# Patient Record
Sex: FEMALE | Race: WHITE | NOT HISPANIC OR LATINO | URBAN - METROPOLITAN AREA
[De-identification: names, ages, dates, MRNs, and addresses within clinical notes are randomized per-mention and may not be internally consistent; named-entity substitution may affect disease eponyms.]

---

## 2023-02-16 ENCOUNTER — OFFICE VISIT (OUTPATIENT)
Dept: ORTHOPEDIC SURGERY | Facility: CLINIC | Age: 78
End: 2023-02-16
Payer: MEDICARE

## 2023-02-16 VITALS — OXYGEN SATURATION: 96 % | HEART RATE: 78 BPM | HEIGHT: 68 IN | BODY MASS INDEX: 29.58 KG/M2 | WEIGHT: 195.2 LBS

## 2023-02-16 DIAGNOSIS — M16.12 PRIMARY LOCALIZED OSTEOARTHROSIS OF THE HIP, LEFT: ICD-10-CM

## 2023-02-16 DIAGNOSIS — M25.552 LEFT HIP PAIN: Primary | ICD-10-CM

## 2023-02-16 PROCEDURE — 99203 OFFICE O/P NEW LOW 30 MIN: CPT | Performed by: ORTHOPAEDIC SURGERY

## 2023-02-16 PROCEDURE — 96372 THER/PROPH/DIAG INJ SC/IM: CPT | Performed by: ORTHOPAEDIC SURGERY

## 2023-02-16 RX ORDER — DEXAMETHASONE SODIUM PHOSPHATE 4 MG/ML
8 INJECTION, SOLUTION INTRA-ARTICULAR; INTRALESIONAL; INTRAMUSCULAR; INTRAVENOUS; SOFT TISSUE ONCE
Status: DISCONTINUED | OUTPATIENT
Start: 2023-02-16 | End: 2023-03-02

## 2023-02-16 RX ORDER — UBIDECARENONE 75 MG
50 CAPSULE ORAL DAILY
COMMUNITY

## 2023-02-16 RX ORDER — CITALOPRAM 10 MG/1
TABLET ORAL
COMMUNITY
Start: 2023-02-15

## 2023-02-16 RX ORDER — MULTIVITAMIN WITH IRON
TABLET ORAL
COMMUNITY

## 2023-02-16 RX ORDER — ATENOLOL 25 MG/1
25 TABLET ORAL DAILY
COMMUNITY

## 2023-02-16 RX ORDER — OXYBUTYNIN CHLORIDE 5 MG/1
5 TABLET ORAL 3 TIMES DAILY
COMMUNITY

## 2023-02-16 RX ORDER — ASPIRIN 81 MG/1
81 TABLET ORAL DAILY
COMMUNITY

## 2023-02-16 RX ORDER — BACLOFEN 10 MG/1
10 TABLET ORAL 3 TIMES DAILY
COMMUNITY

## 2023-02-16 RX ORDER — GABAPENTIN 300 MG/1
300 CAPSULE ORAL 3 TIMES DAILY
COMMUNITY
Start: 2022-11-04

## 2023-03-02 RX ORDER — DEXAMETHASONE SODIUM PHOSPHATE 4 MG/ML
8 INJECTION, SOLUTION INTRA-ARTICULAR; INTRALESIONAL; INTRAMUSCULAR; INTRAVENOUS; SOFT TISSUE ONCE
Status: SHIPPED | OUTPATIENT
Start: 2023-03-02

## 2023-03-30 ENCOUNTER — OFFICE VISIT (OUTPATIENT)
Dept: ORTHOPEDIC SURGERY | Facility: CLINIC | Age: 78
End: 2023-03-30
Payer: MEDICARE

## 2023-03-30 VITALS — BODY MASS INDEX: 30.61 KG/M2 | HEIGHT: 67 IN | WEIGHT: 195 LBS

## 2023-03-30 DIAGNOSIS — M16.12 PRIMARY LOCALIZED OSTEOARTHROSIS OF THE HIP, LEFT: Primary | ICD-10-CM

## 2023-03-30 RX ORDER — OXYBUTYNIN CHLORIDE 15 MG/1
1 TABLET, EXTENDED RELEASE ORAL DAILY
COMMUNITY
Start: 2023-03-21

## 2023-10-17 ENCOUNTER — OFFICE VISIT (OUTPATIENT)
Dept: ORTHOPEDIC SURGERY | Facility: CLINIC | Age: 78
End: 2023-10-17
Payer: MEDICARE

## 2023-10-17 VITALS
OXYGEN SATURATION: 92 % | SYSTOLIC BLOOD PRESSURE: 123 MMHG | HEIGHT: 67 IN | HEART RATE: 70 BPM | DIASTOLIC BLOOD PRESSURE: 83 MMHG | BODY MASS INDEX: 29.66 KG/M2 | WEIGHT: 189 LBS

## 2023-10-17 DIAGNOSIS — M25.552 LEFT HIP PAIN: ICD-10-CM

## 2023-10-17 DIAGNOSIS — M16.12 OSTEOARTHRITIS OF LEFT HIP, UNSPECIFIED OSTEOARTHRITIS TYPE: Primary | ICD-10-CM

## 2023-10-17 RX ORDER — MELOXICAM 15 MG/1
15 TABLET ORAL DAILY
Qty: 30 TABLET | Refills: 1 | Status: SHIPPED | OUTPATIENT
Start: 2023-10-17

## 2023-10-17 RX ORDER — DEXAMETHASONE SODIUM PHOSPHATE 4 MG/ML
8 INJECTION, SOLUTION INTRA-ARTICULAR; INTRALESIONAL; INTRAMUSCULAR; INTRAVENOUS; SOFT TISSUE ONCE
Status: COMPLETED | OUTPATIENT
Start: 2023-10-17 | End: 2023-10-17

## 2023-10-17 RX ADMIN — DEXAMETHASONE SODIUM PHOSPHATE 8 MG: 4 INJECTION, SOLUTION INTRA-ARTICULAR; INTRALESIONAL; INTRAMUSCULAR; INTRAVENOUS; SOFT TISSUE at 15:18

## 2024-02-27 ENCOUNTER — ANESTHESIA EVENT (OUTPATIENT)
Age: 79
End: 2024-02-27
Payer: MEDICARE

## 2024-03-05 ENCOUNTER — APPOINTMENT (OUTPATIENT)
Age: 79
End: 2024-03-05
Payer: MEDICARE

## 2024-03-05 ENCOUNTER — ANESTHESIA (OUTPATIENT)
Age: 79
End: 2024-03-05
Payer: MEDICARE

## 2024-03-05 ENCOUNTER — HOSPITAL ENCOUNTER (OUTPATIENT)
Age: 79
Setting detail: HOSPITAL OUTPATIENT SURGERY
Discharge: HOME | End: 2024-03-05
Attending: UROLOGY | Admitting: UROLOGY
Payer: MEDICARE

## 2024-03-05 VITALS
SYSTOLIC BLOOD PRESSURE: 135 MMHG | WEIGHT: 185 LBS | DIASTOLIC BLOOD PRESSURE: 79 MMHG | OXYGEN SATURATION: 93 % | HEART RATE: 69 BPM | HEIGHT: 67 IN | TEMPERATURE: 97.4 F | RESPIRATION RATE: 16 BRPM | BODY MASS INDEX: 29.03 KG/M2

## 2024-03-05 DIAGNOSIS — N20.0 RIGHT RENAL STONE: Primary | ICD-10-CM

## 2024-03-05 PROCEDURE — 25010000002 AZTREONAM PER 500 MG: Performed by: UROLOGY

## 2024-03-05 PROCEDURE — 25010000002 ONDANSETRON PER 1 MG: Performed by: ANESTHESIOLOGY

## 2024-03-05 PROCEDURE — 76000 FLUOROSCOPY <1 HR PHYS/QHP: CPT

## 2024-03-05 PROCEDURE — 82365 CALCULUS SPECTROSCOPY: CPT | Performed by: UROLOGY

## 2024-03-05 PROCEDURE — 0 IOTHALAMATE 60 % SOLUTION: Performed by: UROLOGY

## 2024-03-05 PROCEDURE — 25010000002 LIDOCAINE 1 % SOLUTION: Performed by: ANESTHESIOLOGY

## 2024-03-05 PROCEDURE — C1894 INTRO/SHEATH, NON-LASER: HCPCS | Performed by: UROLOGY

## 2024-03-05 PROCEDURE — C1758 CATHETER, URETERAL: HCPCS | Performed by: UROLOGY

## 2024-03-05 PROCEDURE — 25010000002 FENTANYL CITRATE (PF) 50 MCG/ML SOLUTION: Performed by: ANESTHESIOLOGY

## 2024-03-05 PROCEDURE — C1769 GUIDE WIRE: HCPCS | Performed by: UROLOGY

## 2024-03-05 PROCEDURE — 52356 CYSTO/URETERO W/LITHOTRIPSY: CPT | Performed by: UROLOGY

## 2024-03-05 PROCEDURE — 25010000002 PROPOFOL 1000 MG/100ML EMULSION: Performed by: ANESTHESIOLOGY

## 2024-03-05 PROCEDURE — C2617 STENT, NON-COR, TEM W/O DEL: HCPCS | Performed by: UROLOGY

## 2024-03-05 PROCEDURE — 25810000003 LACTATED RINGERS PER 1000 ML: Performed by: ANESTHESIOLOGY

## 2024-03-05 PROCEDURE — 25010000002 HYDROMORPHONE PER 4 MG: Performed by: ANESTHESIOLOGY

## 2024-03-05 PROCEDURE — 25010000002 DEXAMETHASONE PER 1 MG: Performed by: ANESTHESIOLOGY

## 2024-03-05 RX ORDER — DIPHENHYDRAMINE HYDROCHLORIDE 50 MG/ML
12.5 INJECTION INTRAMUSCULAR; INTRAVENOUS
Status: DISCONTINUED | OUTPATIENT
Start: 2024-03-05 | End: 2024-03-05 | Stop reason: HOSPADM

## 2024-03-05 RX ORDER — ONDANSETRON 2 MG/ML
INJECTION INTRAMUSCULAR; INTRAVENOUS AS NEEDED
Status: DISCONTINUED | OUTPATIENT
Start: 2024-03-05 | End: 2024-03-05 | Stop reason: SURG

## 2024-03-05 RX ORDER — DEXAMETHASONE SODIUM PHOSPHATE 4 MG/ML
INJECTION, SOLUTION INTRA-ARTICULAR; INTRALESIONAL; INTRAMUSCULAR; INTRAVENOUS; SOFT TISSUE AS NEEDED
Status: DISCONTINUED | OUTPATIENT
Start: 2024-03-05 | End: 2024-03-05 | Stop reason: SURG

## 2024-03-05 RX ORDER — PROPOFOL 10 MG/ML
INJECTION, EMULSION INTRAVENOUS AS NEEDED
Status: DISCONTINUED | OUTPATIENT
Start: 2024-03-05 | End: 2024-03-05 | Stop reason: SURG

## 2024-03-05 RX ORDER — LIDOCAINE HYDROCHLORIDE 10 MG/ML
INJECTION, SOLUTION INFILTRATION; PERINEURAL AS NEEDED
Status: DISCONTINUED | OUTPATIENT
Start: 2024-03-05 | End: 2024-03-05 | Stop reason: SURG

## 2024-03-05 RX ORDER — FLUMAZENIL 0.1 MG/ML
0.2 INJECTION INTRAVENOUS AS NEEDED
Status: DISCONTINUED | OUTPATIENT
Start: 2024-03-05 | End: 2024-03-05 | Stop reason: HOSPADM

## 2024-03-05 RX ORDER — SODIUM CHLORIDE 0.9 % (FLUSH) 0.9 %
3 SYRINGE (ML) INJECTION EVERY 12 HOURS SCHEDULED
Status: DISCONTINUED | OUTPATIENT
Start: 2024-03-05 | End: 2024-03-05 | Stop reason: HOSPADM

## 2024-03-05 RX ORDER — DROPERIDOL 2.5 MG/ML
0.62 INJECTION, SOLUTION INTRAMUSCULAR; INTRAVENOUS
Status: DISCONTINUED | OUTPATIENT
Start: 2024-03-05 | End: 2024-03-05 | Stop reason: HOSPADM

## 2024-03-05 RX ORDER — PROMETHAZINE HYDROCHLORIDE 25 MG/1
25 SUPPOSITORY RECTAL ONCE AS NEEDED
Status: DISCONTINUED | OUTPATIENT
Start: 2024-03-05 | End: 2024-03-05 | Stop reason: HOSPADM

## 2024-03-05 RX ORDER — SODIUM CHLORIDE, SODIUM LACTATE, POTASSIUM CHLORIDE, CALCIUM CHLORIDE 600; 310; 30; 20 MG/100ML; MG/100ML; MG/100ML; MG/100ML
9 INJECTION, SOLUTION INTRAVENOUS CONTINUOUS
Status: DISCONTINUED | OUTPATIENT
Start: 2024-03-05 | End: 2024-03-05 | Stop reason: HOSPADM

## 2024-03-05 RX ORDER — HYDRALAZINE HYDROCHLORIDE 20 MG/ML
5 INJECTION INTRAMUSCULAR; INTRAVENOUS
Status: DISCONTINUED | OUTPATIENT
Start: 2024-03-05 | End: 2024-03-05 | Stop reason: HOSPADM

## 2024-03-05 RX ORDER — LABETALOL HYDROCHLORIDE 5 MG/ML
5 INJECTION, SOLUTION INTRAVENOUS
Status: DISCONTINUED | OUTPATIENT
Start: 2024-03-05 | End: 2024-03-05 | Stop reason: HOSPADM

## 2024-03-05 RX ORDER — FENTANYL CITRATE 50 UG/ML
25 INJECTION, SOLUTION INTRAMUSCULAR; INTRAVENOUS
Status: DISCONTINUED | OUTPATIENT
Start: 2024-03-05 | End: 2024-03-05 | Stop reason: HOSPADM

## 2024-03-05 RX ORDER — MULTIVITAMIN WITH IRON
240 TABLET ORAL DAILY
COMMUNITY

## 2024-03-05 RX ORDER — HYDROMORPHONE HYDROCHLORIDE 1 MG/ML
0.25 INJECTION, SOLUTION INTRAMUSCULAR; INTRAVENOUS; SUBCUTANEOUS
Status: DISCONTINUED | OUTPATIENT
Start: 2024-03-05 | End: 2024-03-05 | Stop reason: HOSPADM

## 2024-03-05 RX ORDER — FAMOTIDINE 10 MG/ML
20 INJECTION, SOLUTION INTRAVENOUS ONCE
Status: COMPLETED | OUTPATIENT
Start: 2024-03-05 | End: 2024-03-05

## 2024-03-05 RX ORDER — BACLOFEN 20 MG/1
20 TABLET ORAL 3 TIMES DAILY
COMMUNITY
Start: 2023-11-20 | End: 2024-05-18

## 2024-03-05 RX ORDER — LISINOPRIL 20 MG/1
20 TABLET ORAL DAILY
COMMUNITY
Start: 2023-11-20 | End: 2024-05-18

## 2024-03-05 RX ORDER — MAGNESIUM HYDROXIDE 1200 MG/15ML
LIQUID ORAL AS NEEDED
Status: DISCONTINUED | OUTPATIENT
Start: 2024-03-05 | End: 2024-03-05 | Stop reason: HOSPADM

## 2024-03-05 RX ORDER — SULFAMETHOXAZOLE AND TRIMETHOPRIM 400; 80 MG/1; MG/1
1 TABLET ORAL 2 TIMES DAILY
Qty: 6 TABLET | Refills: 0 | Status: SHIPPED | OUTPATIENT
Start: 2024-03-05 | End: 2024-03-08

## 2024-03-05 RX ORDER — FENTANYL CITRATE 50 UG/ML
INJECTION, SOLUTION INTRAMUSCULAR; INTRAVENOUS AS NEEDED
Status: DISCONTINUED | OUTPATIENT
Start: 2024-03-05 | End: 2024-03-05 | Stop reason: SURG

## 2024-03-05 RX ORDER — HYDROCODONE BITARTRATE AND ACETAMINOPHEN 5; 325 MG/1; MG/1
1 TABLET ORAL ONCE AS NEEDED
Status: DISCONTINUED | OUTPATIENT
Start: 2024-03-05 | End: 2024-03-05 | Stop reason: HOSPADM

## 2024-03-05 RX ORDER — PHENAZOPYRIDINE HYDROCHLORIDE 100 MG/1
100 TABLET, FILM COATED ORAL 3 TIMES DAILY PRN
Qty: 10 TABLET | Refills: 1 | Status: SHIPPED | OUTPATIENT
Start: 2024-03-05 | End: 2024-04-04

## 2024-03-05 RX ORDER — HYDROCODONE BITARTRATE AND ACETAMINOPHEN 7.5; 325 MG/1; MG/1
1 TABLET ORAL EVERY 4 HOURS PRN
Status: DISCONTINUED | OUTPATIENT
Start: 2024-03-05 | End: 2024-03-05 | Stop reason: HOSPADM

## 2024-03-05 RX ORDER — NALOXONE HCL 0.4 MG/ML
0.2 VIAL (ML) INJECTION AS NEEDED
Status: DISCONTINUED | OUTPATIENT
Start: 2024-03-05 | End: 2024-03-05 | Stop reason: HOSPADM

## 2024-03-05 RX ORDER — DOCUSATE SODIUM 100 MG/1
100 CAPSULE, LIQUID FILLED ORAL DAILY PRN
Qty: 30 CAPSULE | Refills: 1 | Status: SHIPPED | OUTPATIENT
Start: 2024-03-05 | End: 2025-03-05

## 2024-03-05 RX ORDER — PROMETHAZINE HYDROCHLORIDE 12.5 MG/1
25 TABLET ORAL ONCE AS NEEDED
Status: DISCONTINUED | OUTPATIENT
Start: 2024-03-05 | End: 2024-03-05 | Stop reason: HOSPADM

## 2024-03-05 RX ORDER — SODIUM CHLORIDE 0.9 % (FLUSH) 0.9 %
3-10 SYRINGE (ML) INJECTION AS NEEDED
Status: DISCONTINUED | OUTPATIENT
Start: 2024-03-05 | End: 2024-03-05 | Stop reason: HOSPADM

## 2024-03-05 RX ORDER — ONDANSETRON 2 MG/ML
4 INJECTION INTRAMUSCULAR; INTRAVENOUS ONCE AS NEEDED
Status: DISCONTINUED | OUTPATIENT
Start: 2024-03-05 | End: 2024-03-05 | Stop reason: HOSPADM

## 2024-03-05 RX ADMIN — HYDROMORPHONE HYDROCHLORIDE 0.25 MG: 1 INJECTION, SOLUTION INTRAMUSCULAR; INTRAVENOUS; SUBCUTANEOUS at 13:05

## 2024-03-05 RX ADMIN — AZTREONAM 2000 MG: 2 INJECTION, POWDER, LYOPHILIZED, FOR SOLUTION INTRAMUSCULAR; INTRAVENOUS at 11:17

## 2024-03-05 RX ADMIN — HYDROMORPHONE HYDROCHLORIDE 0.25 MG: 1 INJECTION, SOLUTION INTRAMUSCULAR; INTRAVENOUS; SUBCUTANEOUS at 12:58

## 2024-03-05 RX ADMIN — SODIUM CHLORIDE, POTASSIUM CHLORIDE, SODIUM LACTATE AND CALCIUM CHLORIDE 9 ML/HR: 600; 310; 30; 20 INJECTION, SOLUTION INTRAVENOUS at 11:15

## 2024-03-05 RX ADMIN — DEXAMETHASONE SODIUM PHOSPHATE 4 MG: 4 INJECTION, SOLUTION INTRA-ARTICULAR; INTRALESIONAL; INTRAMUSCULAR; INTRAVENOUS; SOFT TISSUE at 11:32

## 2024-03-05 RX ADMIN — FENTANYL CITRATE 25 MCG: 50 INJECTION, SOLUTION INTRAMUSCULAR; INTRAVENOUS at 11:28

## 2024-03-05 RX ADMIN — ONDANSETRON 4 MG: 2 INJECTION INTRAMUSCULAR; INTRAVENOUS at 12:25

## 2024-03-05 RX ADMIN — LIDOCAINE HYDROCHLORIDE 100 MG: 10 INJECTION, SOLUTION INFILTRATION; PERINEURAL at 11:28

## 2024-03-05 RX ADMIN — PROPOFOL 200 MG: 10 INJECTION, EMULSION INTRAVENOUS at 11:28

## 2024-03-05 RX ADMIN — FENTANYL CITRATE 25 MCG: 50 INJECTION, SOLUTION INTRAMUSCULAR; INTRAVENOUS at 11:44

## 2024-03-05 RX ADMIN — FAMOTIDINE 20 MG: 10 INJECTION INTRAVENOUS at 11:17

## 2024-03-12 LAB
CALCIUM OXALATE DIHYDRATE MFR STONE IR: 30 %
COLOR STONE: NORMAL
COM MFR STONE: 60 %
COMPN STONE: NORMAL
HYDROXYAPATITE: 10 %
LABORATORY COMMENT REPORT: NORMAL
LABORATORY COMMENT REPORT: NORMAL
Lab: NORMAL
Lab: NORMAL
PHOTO: NORMAL
SIZE STONE: NORMAL MM
SPEC SOURCE SUBJ: NORMAL
STONE ANALYSIS-IMP: NORMAL
WT STONE: 27 MG

## 2024-03-26 ENCOUNTER — ANESTHESIA EVENT (OUTPATIENT)
Age: 79
End: 2024-03-26
Payer: MEDICARE

## 2024-03-26 RX ORDER — OXYCODONE HYDROCHLORIDE AND ACETAMINOPHEN 5; 325 MG/1; MG/1
1 TABLET ORAL EVERY 4 HOURS PRN
COMMUNITY
End: 2024-04-02 | Stop reason: HOSPADM

## 2024-04-02 ENCOUNTER — HOSPITAL ENCOUNTER (OUTPATIENT)
Facility: HOSPITAL | Age: 79
LOS: 1 days | Discharge: HOME | End: 2024-04-03
Attending: EMERGENCY MEDICINE | Admitting: UROLOGY
Payer: MEDICARE

## 2024-04-02 ENCOUNTER — APPOINTMENT (OUTPATIENT)
Age: 79
End: 2024-04-02
Payer: MEDICARE

## 2024-04-02 ENCOUNTER — HOSPITAL ENCOUNTER (OUTPATIENT)
Age: 79
Setting detail: HOSPITAL OUTPATIENT SURGERY
Discharge: HOME | End: 2024-04-02
Attending: UROLOGY | Admitting: UROLOGY
Payer: MEDICARE

## 2024-04-02 ENCOUNTER — ANESTHESIA (OUTPATIENT)
Age: 79
End: 2024-04-02
Payer: MEDICARE

## 2024-04-02 ENCOUNTER — ANESTHESIA EVENT (OUTPATIENT)
Dept: PERIOP | Facility: HOSPITAL | Age: 79
End: 2024-04-02
Payer: MEDICARE

## 2024-04-02 ENCOUNTER — APPOINTMENT (OUTPATIENT)
Dept: CT IMAGING | Facility: HOSPITAL | Age: 79
End: 2024-04-02
Payer: MEDICARE

## 2024-04-02 VITALS
HEART RATE: 73 BPM | SYSTOLIC BLOOD PRESSURE: 137 MMHG | OXYGEN SATURATION: 92 % | RESPIRATION RATE: 16 BRPM | TEMPERATURE: 98.1 F | HEIGHT: 67 IN | DIASTOLIC BLOOD PRESSURE: 75 MMHG | WEIGHT: 185 LBS | BODY MASS INDEX: 29.03 KG/M2

## 2024-04-02 DIAGNOSIS — N20.0 KIDNEY STONE: Primary | ICD-10-CM

## 2024-04-02 DIAGNOSIS — N20.0 RENAL CALCULUS, RIGHT: Primary | ICD-10-CM

## 2024-04-02 DIAGNOSIS — N99.89 POSTOPERATIVE SURGICAL COMPLICATION INVOLVING GENITOURINARY SYSTEM ASSOCIATED WITH GENITOURINARY PROCEDURE, UNSPECIFIED COMPLICATION: ICD-10-CM

## 2024-04-02 DIAGNOSIS — N20.0 RENAL CALCULUS, RIGHT: ICD-10-CM

## 2024-04-02 DIAGNOSIS — N13.30 HYDRONEPHROSIS OF RIGHT KIDNEY: ICD-10-CM

## 2024-04-02 DIAGNOSIS — N20.1 URETERAL CALCULI: ICD-10-CM

## 2024-04-02 DIAGNOSIS — N12 PYELITIS: ICD-10-CM

## 2024-04-02 LAB
ALBUMIN SERPL-MCNC: 3.6 G/DL (ref 3.5–5.2)
ALBUMIN/GLOB SERPL: 1.2 G/DL
ALP SERPL-CCNC: 76 U/L (ref 39–117)
ALT SERPL W P-5'-P-CCNC: 42 U/L (ref 1–33)
ANION GAP SERPL CALCULATED.3IONS-SCNC: 13.7 MMOL/L (ref 5–15)
AST SERPL-CCNC: 61 U/L (ref 1–32)
BACTERIA UR QL AUTO: ABNORMAL /HPF
BASOPHILS # BLD AUTO: 0.01 10*3/MM3 (ref 0–0.2)
BASOPHILS NFR BLD AUTO: 0.1 % (ref 0–1.5)
BILIRUB SERPL-MCNC: 0.4 MG/DL (ref 0–1.2)
BILIRUB UR QL STRIP: ABNORMAL
BUN SERPL-MCNC: 14 MG/DL (ref 8–23)
BUN/CREAT SERPL: 17.1 (ref 7–25)
CALCIUM SPEC-SCNC: 8.9 MG/DL (ref 8.6–10.5)
CHLORIDE SERPL-SCNC: 101 MMOL/L (ref 98–107)
CLARITY UR: ABNORMAL
CO2 SERPL-SCNC: 21.3 MMOL/L (ref 22–29)
COLOR UR: ABNORMAL
CREAT SERPL-MCNC: 0.82 MG/DL (ref 0.57–1)
DEPRECATED RDW RBC AUTO: 45.2 FL (ref 37–54)
EGFRCR SERPLBLD CKD-EPI 2021: 73.3 ML/MIN/1.73
EOSINOPHIL # BLD AUTO: 0 10*3/MM3 (ref 0–0.4)
EOSINOPHIL NFR BLD AUTO: 0 % (ref 0.3–6.2)
ERYTHROCYTE [DISTWIDTH] IN BLOOD BY AUTOMATED COUNT: 13.5 % (ref 12.3–15.4)
GLOBULIN UR ELPH-MCNC: 3.1 GM/DL
GLUCOSE SERPL-MCNC: 222 MG/DL (ref 65–99)
GLUCOSE UR STRIP-MCNC: ABNORMAL MG/DL
HCT VFR BLD AUTO: 46.8 % (ref 34–46.6)
HGB BLD-MCNC: 14.9 G/DL (ref 12–15.9)
HGB UR QL STRIP.AUTO: ABNORMAL
HYALINE CASTS UR QL AUTO: ABNORMAL /LPF
IMM GRANULOCYTES # BLD AUTO: 0.04 10*3/MM3 (ref 0–0.05)
IMM GRANULOCYTES NFR BLD AUTO: 0.3 % (ref 0–0.5)
KETONES UR QL STRIP: ABNORMAL
LEUKOCYTE ESTERASE UR QL STRIP.AUTO: ABNORMAL
LYMPHOCYTES # BLD AUTO: 0.78 10*3/MM3 (ref 0.7–3.1)
LYMPHOCYTES NFR BLD AUTO: 5.8 % (ref 19.6–45.3)
MCH RBC QN AUTO: 28.9 PG (ref 26.6–33)
MCHC RBC AUTO-ENTMCNC: 31.8 G/DL (ref 31.5–35.7)
MCV RBC AUTO: 90.9 FL (ref 79–97)
MONOCYTES # BLD AUTO: 0.47 10*3/MM3 (ref 0.1–0.9)
MONOCYTES NFR BLD AUTO: 3.5 % (ref 5–12)
NEUTROPHILS NFR BLD AUTO: 12.09 10*3/MM3 (ref 1.7–7)
NEUTROPHILS NFR BLD AUTO: 90.3 % (ref 42.7–76)
NITRITE UR QL STRIP: ABNORMAL
NRBC BLD AUTO-RTO: 0 /100 WBC (ref 0–0.2)
PH UR STRIP.AUTO: ABNORMAL [PH]
PLATELET # BLD AUTO: 207 10*3/MM3 (ref 140–450)
PMV BLD AUTO: 9.7 FL (ref 6–12)
POTASSIUM SERPL-SCNC: 4.7 MMOL/L (ref 3.5–5.2)
PROT SERPL-MCNC: 6.7 G/DL (ref 6–8.5)
PROT UR QL STRIP: ABNORMAL
RBC # BLD AUTO: 5.15 10*6/MM3 (ref 3.77–5.28)
RBC # UR STRIP: ABNORMAL /HPF
REF LAB TEST METHOD: ABNORMAL
SODIUM SERPL-SCNC: 136 MMOL/L (ref 136–145)
SP GR UR STRIP: 1.02 (ref 1–1.03)
SQUAMOUS #/AREA URNS HPF: ABNORMAL /HPF
UROBILINOGEN UR QL STRIP: ABNORMAL
WBC # UR STRIP: ABNORMAL /HPF
WBC NRBC COR # BLD AUTO: 13.39 10*3/MM3 (ref 3.4–10.8)

## 2024-04-02 PROCEDURE — 25010000002 ONDANSETRON PER 1 MG: Performed by: EMERGENCY MEDICINE

## 2024-04-02 PROCEDURE — C1758 CATHETER, URETERAL: HCPCS | Performed by: UROLOGY

## 2024-04-02 PROCEDURE — 81001 URINALYSIS AUTO W/SCOPE: CPT

## 2024-04-02 PROCEDURE — 25010000002 DEXAMETHASONE PER 1 MG: Performed by: NURSE ANESTHETIST, CERTIFIED REGISTERED

## 2024-04-02 PROCEDURE — C1769 GUIDE WIRE: HCPCS | Performed by: UROLOGY

## 2024-04-02 PROCEDURE — 99204 OFFICE O/P NEW MOD 45 MIN: CPT | Performed by: STUDENT IN AN ORGANIZED HEALTH CARE EDUCATION/TRAINING PROGRAM

## 2024-04-02 PROCEDURE — 74176 CT ABD & PELVIS W/O CONTRAST: CPT

## 2024-04-02 PROCEDURE — 25010000002 HYDROMORPHONE PER 4 MG: Performed by: NURSE ANESTHETIST, CERTIFIED REGISTERED

## 2024-04-02 PROCEDURE — 99285 EMERGENCY DEPT VISIT HI MDM: CPT

## 2024-04-02 PROCEDURE — 25010000002 PROPOFOL 10 MG/ML EMULSION: Performed by: NURSE ANESTHETIST, CERTIFIED REGISTERED

## 2024-04-02 PROCEDURE — 25010000002 MORPHINE PER 10 MG: Performed by: EMERGENCY MEDICINE

## 2024-04-02 PROCEDURE — 76000 FLUOROSCOPY <1 HR PHYS/QHP: CPT

## 2024-04-02 PROCEDURE — C1894 INTRO/SHEATH, NON-LASER: HCPCS | Performed by: UROLOGY

## 2024-04-02 PROCEDURE — 96375 TX/PRO/DX INJ NEW DRUG ADDON: CPT

## 2024-04-02 PROCEDURE — 25810000003 LACTATED RINGERS PER 1000 ML: Performed by: ANESTHESIOLOGY

## 2024-04-02 PROCEDURE — 80053 COMPREHEN METABOLIC PANEL: CPT

## 2024-04-02 PROCEDURE — 25010000002 AZTREONAM PER 500 MG: Performed by: UROLOGY

## 2024-04-02 PROCEDURE — 25010000002 KETOROLAC TROMETHAMINE PER 15 MG

## 2024-04-02 PROCEDURE — 52352 CYSTOURETERO W/STONE REMOVE: CPT | Performed by: UROLOGY

## 2024-04-02 PROCEDURE — 82365 CALCULUS SPECTROSCOPY: CPT | Performed by: UROLOGY

## 2024-04-02 PROCEDURE — 99222 1ST HOSP IP/OBS MODERATE 55: CPT | Performed by: UROLOGY

## 2024-04-02 PROCEDURE — 25010000002 HYDROMORPHONE 1 MG/ML SOLUTION: Performed by: NURSE ANESTHETIST, CERTIFIED REGISTERED

## 2024-04-02 PROCEDURE — 85025 COMPLETE CBC W/AUTO DIFF WBC: CPT

## 2024-04-02 PROCEDURE — 25810000003 SODIUM CHLORIDE 0.9 % SOLUTION

## 2024-04-02 PROCEDURE — 87086 URINE CULTURE/COLONY COUNT: CPT | Performed by: STUDENT IN AN ORGANIZED HEALTH CARE EDUCATION/TRAINING PROGRAM

## 2024-04-02 PROCEDURE — 0 DEXTROSE 5 % SOLUTION 250 ML FLEX CONT: Performed by: EMERGENCY MEDICINE

## 2024-04-02 PROCEDURE — 25010000002 ONDANSETRON PER 1 MG: Performed by: NURSE ANESTHETIST, CERTIFIED REGISTERED

## 2024-04-02 PROCEDURE — 96374 THER/PROPH/DIAG INJ IV PUSH: CPT

## 2024-04-02 PROCEDURE — 36415 COLL VENOUS BLD VENIPUNCTURE: CPT

## 2024-04-02 PROCEDURE — 25010000002 FENTANYL CITRATE (PF) 50 MCG/ML SOLUTION: Performed by: NURSE ANESTHETIST, CERTIFIED REGISTERED

## 2024-04-02 RX ORDER — DEXAMETHASONE SODIUM PHOSPHATE 4 MG/ML
INJECTION, SOLUTION INTRA-ARTICULAR; INTRALESIONAL; INTRAMUSCULAR; INTRAVENOUS; SOFT TISSUE AS NEEDED
Status: DISCONTINUED | OUTPATIENT
Start: 2024-04-02 | End: 2024-04-02 | Stop reason: SURG

## 2024-04-02 RX ORDER — ONDANSETRON 2 MG/ML
4 INJECTION INTRAMUSCULAR; INTRAVENOUS ONCE
Status: COMPLETED | OUTPATIENT
Start: 2024-04-02 | End: 2024-04-02

## 2024-04-02 RX ORDER — ONDANSETRON 4 MG/1
4 TABLET, FILM COATED ORAL EVERY 8 HOURS PRN
Qty: 20 TABLET | Refills: 0 | Status: SHIPPED | OUTPATIENT
Start: 2024-04-02 | End: 2024-05-02

## 2024-04-02 RX ORDER — OXYCODONE HYDROCHLORIDE AND ACETAMINOPHEN 5; 325 MG/1; MG/1
1 TABLET ORAL EVERY 4 HOURS PRN
Status: COMPLETED | OUTPATIENT
Start: 2024-04-02 | End: 2024-04-02

## 2024-04-02 RX ORDER — MIDAZOLAM HYDROCHLORIDE 1 MG/ML
0.5 INJECTION INTRAMUSCULAR; INTRAVENOUS
Status: DISCONTINUED | OUTPATIENT
Start: 2024-04-02 | End: 2024-04-02 | Stop reason: HOSPADM

## 2024-04-02 RX ORDER — PHENAZOPYRIDINE HYDROCHLORIDE 100 MG/1
100 TABLET, FILM COATED ORAL 3 TIMES DAILY PRN
Qty: 10 TABLET | Refills: 1 | Status: SHIPPED | OUTPATIENT
Start: 2024-04-02 | End: 2024-05-02

## 2024-04-02 RX ORDER — SODIUM CHLORIDE 0.9 % (FLUSH) 0.9 %
3 SYRINGE (ML) INJECTION EVERY 12 HOURS SCHEDULED
Status: DISCONTINUED | OUTPATIENT
Start: 2024-04-02 | End: 2024-04-02 | Stop reason: HOSPADM

## 2024-04-02 RX ORDER — NALOXONE HCL 0.4 MG/ML
0.2 VIAL (ML) INJECTION AS NEEDED
Status: DISCONTINUED | OUTPATIENT
Start: 2024-04-02 | End: 2024-04-02 | Stop reason: HOSPADM

## 2024-04-02 RX ORDER — DROPERIDOL 2.5 MG/ML
0.62 INJECTION, SOLUTION INTRAMUSCULAR; INTRAVENOUS
Status: DISCONTINUED | OUTPATIENT
Start: 2024-04-02 | End: 2024-04-02 | Stop reason: HOSPADM

## 2024-04-02 RX ORDER — HYDROMORPHONE HYDROCHLORIDE 1 MG/ML
0.25 INJECTION, SOLUTION INTRAMUSCULAR; INTRAVENOUS; SUBCUTANEOUS
Status: DISCONTINUED | OUTPATIENT
Start: 2024-04-02 | End: 2024-04-02 | Stop reason: HOSPADM

## 2024-04-02 RX ORDER — PROPOFOL 10 MG/ML
VIAL (ML) INTRAVENOUS AS NEEDED
Status: DISCONTINUED | OUTPATIENT
Start: 2024-04-02 | End: 2024-04-02 | Stop reason: SURG

## 2024-04-02 RX ORDER — DOCUSATE SODIUM 100 MG/1
100 CAPSULE, LIQUID FILLED ORAL DAILY PRN
Qty: 30 CAPSULE | Refills: 1 | Status: SHIPPED | OUTPATIENT
Start: 2024-04-02 | End: 2025-04-02

## 2024-04-02 RX ORDER — DIPHENHYDRAMINE HYDROCHLORIDE 50 MG/ML
12.5 INJECTION INTRAMUSCULAR; INTRAVENOUS
Status: DISCONTINUED | OUTPATIENT
Start: 2024-04-02 | End: 2024-04-02 | Stop reason: HOSPADM

## 2024-04-02 RX ORDER — HYDROCODONE BITARTRATE AND ACETAMINOPHEN 7.5; 325 MG/1; MG/1
1 TABLET ORAL EVERY 4 HOURS PRN
Status: DISCONTINUED | OUTPATIENT
Start: 2024-04-02 | End: 2024-04-02 | Stop reason: HOSPADM

## 2024-04-02 RX ORDER — HYDROCODONE BITARTRATE AND ACETAMINOPHEN 5; 325 MG/1; MG/1
1 TABLET ORAL ONCE AS NEEDED
Status: DISCONTINUED | OUTPATIENT
Start: 2024-04-02 | End: 2024-04-02 | Stop reason: HOSPADM

## 2024-04-02 RX ORDER — PROMETHAZINE HYDROCHLORIDE 12.5 MG/1
25 TABLET ORAL ONCE AS NEEDED
Status: DISCONTINUED | OUTPATIENT
Start: 2024-04-02 | End: 2024-04-02 | Stop reason: HOSPADM

## 2024-04-02 RX ORDER — SULFAMETHOXAZOLE AND TRIMETHOPRIM 800; 160 MG/1; MG/1
1 TABLET ORAL 2 TIMES DAILY
Qty: 6 TABLET | Refills: 0 | Status: ON HOLD | OUTPATIENT
Start: 2024-04-02 | End: 2024-04-03

## 2024-04-02 RX ORDER — FENTANYL CITRATE 50 UG/ML
INJECTION, SOLUTION INTRAMUSCULAR; INTRAVENOUS AS NEEDED
Status: DISCONTINUED | OUTPATIENT
Start: 2024-04-02 | End: 2024-04-02 | Stop reason: SURG

## 2024-04-02 RX ORDER — KETOROLAC TROMETHAMINE 30 MG/ML
30 INJECTION, SOLUTION INTRAMUSCULAR; INTRAVENOUS ONCE
Status: COMPLETED | OUTPATIENT
Start: 2024-04-02 | End: 2024-04-02

## 2024-04-02 RX ORDER — SODIUM CHLORIDE 0.9 % (FLUSH) 0.9 %
3-10 SYRINGE (ML) INJECTION AS NEEDED
Status: DISCONTINUED | OUTPATIENT
Start: 2024-04-02 | End: 2024-04-02 | Stop reason: HOSPADM

## 2024-04-02 RX ORDER — LIDOCAINE HYDROCHLORIDE 20 MG/ML
INJECTION, SOLUTION INFILTRATION; PERINEURAL AS NEEDED
Status: DISCONTINUED | OUTPATIENT
Start: 2024-04-02 | End: 2024-04-02 | Stop reason: SURG

## 2024-04-02 RX ORDER — PHENAZOPYRIDINE HYDROCHLORIDE 100 MG/1
100 TABLET, FILM COATED ORAL ONCE
Status: COMPLETED | OUTPATIENT
Start: 2024-04-02 | End: 2024-04-02

## 2024-04-02 RX ORDER — ONDANSETRON 2 MG/ML
INJECTION INTRAMUSCULAR; INTRAVENOUS AS NEEDED
Status: DISCONTINUED | OUTPATIENT
Start: 2024-04-02 | End: 2024-04-02 | Stop reason: SURG

## 2024-04-02 RX ORDER — FLUMAZENIL 0.1 MG/ML
0.2 INJECTION INTRAVENOUS AS NEEDED
Status: DISCONTINUED | OUTPATIENT
Start: 2024-04-02 | End: 2024-04-02 | Stop reason: HOSPADM

## 2024-04-02 RX ORDER — LABETALOL HYDROCHLORIDE 5 MG/ML
5 INJECTION, SOLUTION INTRAVENOUS
Status: DISCONTINUED | OUTPATIENT
Start: 2024-04-02 | End: 2024-04-02 | Stop reason: HOSPADM

## 2024-04-02 RX ORDER — PROMETHAZINE HYDROCHLORIDE 25 MG/1
25 SUPPOSITORY RECTAL ONCE AS NEEDED
Status: DISCONTINUED | OUTPATIENT
Start: 2024-04-02 | End: 2024-04-02 | Stop reason: HOSPADM

## 2024-04-02 RX ORDER — ACETAMINOPHEN 500 MG
1000 TABLET ORAL ONCE
Status: COMPLETED | OUTPATIENT
Start: 2024-04-02 | End: 2024-04-02

## 2024-04-02 RX ORDER — HYDRALAZINE HYDROCHLORIDE 20 MG/ML
5 INJECTION INTRAMUSCULAR; INTRAVENOUS
Status: DISCONTINUED | OUTPATIENT
Start: 2024-04-02 | End: 2024-04-02 | Stop reason: HOSPADM

## 2024-04-02 RX ORDER — ONDANSETRON 2 MG/ML
4 INJECTION INTRAMUSCULAR; INTRAVENOUS ONCE AS NEEDED
Status: DISCONTINUED | OUTPATIENT
Start: 2024-04-02 | End: 2024-04-02 | Stop reason: HOSPADM

## 2024-04-02 RX ORDER — FENTANYL CITRATE 50 UG/ML
25 INJECTION, SOLUTION INTRAMUSCULAR; INTRAVENOUS
Status: DISCONTINUED | OUTPATIENT
Start: 2024-04-02 | End: 2024-04-02 | Stop reason: HOSPADM

## 2024-04-02 RX ORDER — SODIUM CHLORIDE, SODIUM LACTATE, POTASSIUM CHLORIDE, CALCIUM CHLORIDE 600; 310; 30; 20 MG/100ML; MG/100ML; MG/100ML; MG/100ML
9 INJECTION, SOLUTION INTRAVENOUS CONTINUOUS
Status: DISCONTINUED | OUTPATIENT
Start: 2024-04-02 | End: 2024-04-02 | Stop reason: HOSPADM

## 2024-04-02 RX ADMIN — SODIUM CHLORIDE 1000 ML: 9 INJECTION, SOLUTION INTRAVENOUS at 22:24

## 2024-04-02 RX ADMIN — SULFAMETHOXAZOLE AND TRIMETHOPRIM 160 MG OF TRIMETHOPRIM: 80; 16 INJECTION, SOLUTION, CONCENTRATE INTRAVENOUS at 23:50

## 2024-04-02 RX ADMIN — OXYCODONE HYDROCHLORIDE AND ACETAMINOPHEN 1 TABLET: 5; 325 TABLET ORAL at 12:31

## 2024-04-02 RX ADMIN — SODIUM CHLORIDE, POTASSIUM CHLORIDE, SODIUM LACTATE AND CALCIUM CHLORIDE 9 ML/HR: 600; 310; 30; 20 INJECTION, SOLUTION INTRAVENOUS at 08:57

## 2024-04-02 RX ADMIN — ACETAMINOPHEN 1000 MG: 500 TABLET ORAL at 09:08

## 2024-04-02 RX ADMIN — MORPHINE SULFATE 4 MG: 4 INJECTION, SOLUTION INTRAMUSCULAR; INTRAVENOUS at 22:25

## 2024-04-02 RX ADMIN — KETOROLAC TROMETHAMINE 30 MG: 30 INJECTION, SOLUTION INTRAMUSCULAR; INTRAVENOUS at 22:25

## 2024-04-02 RX ADMIN — HYDROMORPHONE HYDROCHLORIDE 0.25 MG: 1 INJECTION, SOLUTION INTRAMUSCULAR; INTRAVENOUS; SUBCUTANEOUS at 11:48

## 2024-04-02 RX ADMIN — ONDANSETRON 4 MG: 2 INJECTION INTRAMUSCULAR; INTRAVENOUS at 10:16

## 2024-04-02 RX ADMIN — HYDROMORPHONE HYDROCHLORIDE 0.25 MG: 1 INJECTION, SOLUTION INTRAMUSCULAR; INTRAVENOUS; SUBCUTANEOUS at 12:11

## 2024-04-02 RX ADMIN — HYDROMORPHONE HYDROCHLORIDE 0.25 MG: 1 INJECTION, SOLUTION INTRAMUSCULAR; INTRAVENOUS; SUBCUTANEOUS at 12:20

## 2024-04-02 RX ADMIN — PROPOFOL 150 MG: 10 INJECTION, EMULSION INTRAVENOUS at 10:16

## 2024-04-02 RX ADMIN — PHENAZOPYRIDINE 100 MG: 100 TABLET ORAL at 12:31

## 2024-04-02 RX ADMIN — AZTREONAM 2000 MG: 2 INJECTION, POWDER, LYOPHILIZED, FOR SOLUTION INTRAMUSCULAR; INTRAVENOUS at 09:58

## 2024-04-02 RX ADMIN — FENTANYL CITRATE 25 MCG: 50 INJECTION, SOLUTION INTRAMUSCULAR; INTRAVENOUS at 10:17

## 2024-04-02 RX ADMIN — LIDOCAINE HYDROCHLORIDE 60 MG: 20 INJECTION, SOLUTION INFILTRATION; PERINEURAL at 10:16

## 2024-04-02 RX ADMIN — FENTANYL CITRATE 25 MCG: 50 INJECTION, SOLUTION INTRAMUSCULAR; INTRAVENOUS at 10:19

## 2024-04-02 RX ADMIN — DEXAMETHASONE SODIUM PHOSPHATE 6 MG: 4 INJECTION, SOLUTION INTRA-ARTICULAR; INTRALESIONAL; INTRAMUSCULAR; INTRAVENOUS; SOFT TISSUE at 10:16

## 2024-04-02 RX ADMIN — ONDANSETRON 4 MG: 2 INJECTION INTRAMUSCULAR; INTRAVENOUS at 22:24

## 2024-04-02 RX ADMIN — HYDROMORPHONE HYDROCHLORIDE 0.25 MG: 1 INJECTION, SOLUTION INTRAMUSCULAR; INTRAVENOUS; SUBCUTANEOUS at 11:31

## 2024-04-03 ENCOUNTER — ANESTHESIA (OUTPATIENT)
Dept: PERIOP | Facility: HOSPITAL | Age: 79
End: 2024-04-03
Payer: MEDICARE

## 2024-04-03 ENCOUNTER — TELEPHONE (OUTPATIENT)
Dept: UROLOGY | Facility: CLINIC | Age: 79
End: 2024-04-03
Payer: MEDICARE

## 2024-04-03 ENCOUNTER — APPOINTMENT (OUTPATIENT)
Dept: GENERAL RADIOLOGY | Facility: HOSPITAL | Age: 79
End: 2024-04-03
Payer: MEDICARE

## 2024-04-03 ENCOUNTER — READMISSION MANAGEMENT (OUTPATIENT)
Dept: CALL CENTER | Facility: HOSPITAL | Age: 79
End: 2024-04-03
Payer: MEDICARE

## 2024-04-03 VITALS
DIASTOLIC BLOOD PRESSURE: 67 MMHG | BODY MASS INDEX: 29.03 KG/M2 | HEIGHT: 67 IN | TEMPERATURE: 97.9 F | HEART RATE: 73 BPM | WEIGHT: 185 LBS | OXYGEN SATURATION: 92 % | RESPIRATION RATE: 18 BRPM | SYSTOLIC BLOOD PRESSURE: 133 MMHG

## 2024-04-03 PROBLEM — N13.30 HYDRONEPHROSIS OF RIGHT KIDNEY: Status: ACTIVE | Noted: 2024-04-03

## 2024-04-03 LAB
ALBUMIN SERPL-MCNC: 3.2 G/DL (ref 3.5–5.2)
ALBUMIN/GLOB SERPL: 1.3 G/DL
ALP SERPL-CCNC: 62 U/L (ref 39–117)
ALT SERPL W P-5'-P-CCNC: 32 U/L (ref 1–33)
ANION GAP SERPL CALCULATED.3IONS-SCNC: 10.4 MMOL/L (ref 5–15)
AST SERPL-CCNC: 37 U/L (ref 1–32)
BASOPHILS # BLD AUTO: 0.01 10*3/MM3 (ref 0–0.2)
BASOPHILS NFR BLD AUTO: 0.1 % (ref 0–1.5)
BILIRUB SERPL-MCNC: 0.3 MG/DL (ref 0–1.2)
BUN SERPL-MCNC: 10 MG/DL (ref 8–23)
BUN/CREAT SERPL: 14.9 (ref 7–25)
CALCIUM SPEC-SCNC: 8 MG/DL (ref 8.6–10.5)
CHLORIDE SERPL-SCNC: 107 MMOL/L (ref 98–107)
CO2 SERPL-SCNC: 20.6 MMOL/L (ref 22–29)
CREAT SERPL-MCNC: 0.67 MG/DL (ref 0.57–1)
DEPRECATED RDW RBC AUTO: 45.2 FL (ref 37–54)
EGFRCR SERPLBLD CKD-EPI 2021: 89.6 ML/MIN/1.73
EOSINOPHIL # BLD AUTO: 0 10*3/MM3 (ref 0–0.4)
EOSINOPHIL NFR BLD AUTO: 0 % (ref 0.3–6.2)
ERYTHROCYTE [DISTWIDTH] IN BLOOD BY AUTOMATED COUNT: 13.6 % (ref 12.3–15.4)
GLOBULIN UR ELPH-MCNC: 2.5 GM/DL
GLUCOSE SERPL-MCNC: 152 MG/DL (ref 65–99)
HCT VFR BLD AUTO: 41.3 % (ref 34–46.6)
HGB BLD-MCNC: 13.3 G/DL (ref 12–15.9)
IMM GRANULOCYTES # BLD AUTO: 0.08 10*3/MM3 (ref 0–0.05)
IMM GRANULOCYTES NFR BLD AUTO: 0.6 % (ref 0–0.5)
LYMPHOCYTES # BLD AUTO: 1.16 10*3/MM3 (ref 0.7–3.1)
LYMPHOCYTES NFR BLD AUTO: 8.2 % (ref 19.6–45.3)
MAGNESIUM SERPL-MCNC: 1.8 MG/DL (ref 1.6–2.4)
MCH RBC QN AUTO: 29.2 PG (ref 26.6–33)
MCHC RBC AUTO-ENTMCNC: 32.2 G/DL (ref 31.5–35.7)
MCV RBC AUTO: 90.6 FL (ref 79–97)
MONOCYTES # BLD AUTO: 0.41 10*3/MM3 (ref 0.1–0.9)
MONOCYTES NFR BLD AUTO: 2.9 % (ref 5–12)
NEUTROPHILS NFR BLD AUTO: 12.55 10*3/MM3 (ref 1.7–7)
NEUTROPHILS NFR BLD AUTO: 88.2 % (ref 42.7–76)
NRBC BLD AUTO-RTO: 0 /100 WBC (ref 0–0.2)
PHOSPHATE SERPL-MCNC: 2.6 MG/DL (ref 2.5–4.5)
PLATELET # BLD AUTO: 176 10*3/MM3 (ref 140–450)
PMV BLD AUTO: 9.8 FL (ref 6–12)
POTASSIUM SERPL-SCNC: 4.7 MMOL/L (ref 3.5–5.2)
PROT SERPL-MCNC: 5.7 G/DL (ref 6–8.5)
RBC # BLD AUTO: 4.56 10*6/MM3 (ref 3.77–5.28)
SODIUM SERPL-SCNC: 138 MMOL/L (ref 136–145)
WBC NRBC COR # BLD AUTO: 14.21 10*3/MM3 (ref 3.4–10.8)

## 2024-04-03 PROCEDURE — 25010000002 PROPOFOL 10 MG/ML EMULSION: Performed by: ANESTHESIOLOGY

## 2024-04-03 PROCEDURE — 94799 UNLISTED PULMONARY SVC/PX: CPT

## 2024-04-03 PROCEDURE — 76000 FLUOROSCOPY <1 HR PHYS/QHP: CPT

## 2024-04-03 PROCEDURE — 83735 ASSAY OF MAGNESIUM: CPT | Performed by: STUDENT IN AN ORGANIZED HEALTH CARE EDUCATION/TRAINING PROGRAM

## 2024-04-03 PROCEDURE — 25510000001 IOPAMIDOL PER 1 ML: Performed by: UROLOGY

## 2024-04-03 PROCEDURE — 80053 COMPREHEN METABOLIC PANEL: CPT | Performed by: STUDENT IN AN ORGANIZED HEALTH CARE EDUCATION/TRAINING PROGRAM

## 2024-04-03 PROCEDURE — 74420 UROGRAPHY RTRGR +-KUB: CPT | Performed by: UROLOGY

## 2024-04-03 PROCEDURE — 84100 ASSAY OF PHOSPHORUS: CPT | Performed by: STUDENT IN AN ORGANIZED HEALTH CARE EDUCATION/TRAINING PROGRAM

## 2024-04-03 PROCEDURE — C1769 GUIDE WIRE: HCPCS | Performed by: UROLOGY

## 2024-04-03 PROCEDURE — 25010000002 ONDANSETRON PER 1 MG: Performed by: ANESTHESIOLOGY

## 2024-04-03 PROCEDURE — C1758 CATHETER, URETERAL: HCPCS | Performed by: UROLOGY

## 2024-04-03 PROCEDURE — 85025 COMPLETE CBC W/AUTO DIFF WBC: CPT | Performed by: STUDENT IN AN ORGANIZED HEALTH CARE EDUCATION/TRAINING PROGRAM

## 2024-04-03 PROCEDURE — 0 DEXTROSE 5 % SOLUTION 250 ML FLEX CONT: Performed by: STUDENT IN AN ORGANIZED HEALTH CARE EDUCATION/TRAINING PROGRAM

## 2024-04-03 PROCEDURE — 25010000002 DEXAMETHASONE PER 1 MG: Performed by: ANESTHESIOLOGY

## 2024-04-03 PROCEDURE — G0378 HOSPITAL OBSERVATION PER HR: HCPCS

## 2024-04-03 PROCEDURE — C2617 STENT, NON-COR, TEM W/O DEL: HCPCS | Performed by: UROLOGY

## 2024-04-03 PROCEDURE — 99214 OFFICE O/P EST MOD 30 MIN: CPT | Performed by: INTERNAL MEDICINE

## 2024-04-03 PROCEDURE — 25810000003 SODIUM CHLORIDE 0.9 % SOLUTION: Performed by: UROLOGY

## 2024-04-03 PROCEDURE — 52310 CYSTOSCOPY AND TREATMENT: CPT | Performed by: UROLOGY

## 2024-04-03 DEVICE — URETERAL STENT
Type: IMPLANTABLE DEVICE | Site: URETER | Status: FUNCTIONAL
Brand: ASCERTA™

## 2024-04-03 RX ORDER — ACETAMINOPHEN 325 MG/1
650 TABLET ORAL EVERY 4 HOURS PRN
Status: DISCONTINUED | OUTPATIENT
Start: 2024-04-03 | End: 2024-04-03 | Stop reason: HOSPADM

## 2024-04-03 RX ORDER — BISACODYL 10 MG
10 SUPPOSITORY, RECTAL RECTAL DAILY PRN
Status: DISCONTINUED | OUTPATIENT
Start: 2024-04-03 | End: 2024-04-03 | Stop reason: HOSPADM

## 2024-04-03 RX ORDER — SODIUM CHLORIDE 0.9 % (FLUSH) 0.9 %
10 SYRINGE (ML) INJECTION EVERY 12 HOURS SCHEDULED
Status: DISCONTINUED | OUTPATIENT
Start: 2024-04-03 | End: 2024-04-03 | Stop reason: HOSPADM

## 2024-04-03 RX ORDER — NITROGLYCERIN 0.4 MG/1
0.4 TABLET SUBLINGUAL
Status: DISCONTINUED | OUTPATIENT
Start: 2024-04-03 | End: 2024-04-03 | Stop reason: HOSPADM

## 2024-04-03 RX ORDER — LIDOCAINE HYDROCHLORIDE 20 MG/ML
INJECTION, SOLUTION EPIDURAL; INFILTRATION; INTRACAUDAL; PERINEURAL AS NEEDED
Status: DISCONTINUED | OUTPATIENT
Start: 2024-04-03 | End: 2024-04-03 | Stop reason: SURG

## 2024-04-03 RX ORDER — KETOROLAC TROMETHAMINE 30 MG/ML
15 INJECTION, SOLUTION INTRAMUSCULAR; INTRAVENOUS EVERY 6 HOURS PRN
Status: DISCONTINUED | OUTPATIENT
Start: 2024-04-03 | End: 2024-04-03 | Stop reason: HOSPADM

## 2024-04-03 RX ORDER — PROPOFOL 10 MG/ML
VIAL (ML) INTRAVENOUS AS NEEDED
Status: DISCONTINUED | OUTPATIENT
Start: 2024-04-03 | End: 2024-04-03 | Stop reason: SURG

## 2024-04-03 RX ORDER — ONDANSETRON 2 MG/ML
4 INJECTION INTRAMUSCULAR; INTRAVENOUS ONCE AS NEEDED
Status: DISCONTINUED | OUTPATIENT
Start: 2024-04-03 | End: 2024-04-03

## 2024-04-03 RX ORDER — OXYCODONE HYDROCHLORIDE 5 MG/1
5 TABLET ORAL ONCE AS NEEDED
Status: DISCONTINUED | OUTPATIENT
Start: 2024-04-03 | End: 2024-04-03

## 2024-04-03 RX ORDER — ONDANSETRON 2 MG/ML
4 INJECTION INTRAMUSCULAR; INTRAVENOUS EVERY 6 HOURS PRN
Status: DISCONTINUED | OUTPATIENT
Start: 2024-04-03 | End: 2024-04-03

## 2024-04-03 RX ORDER — BISACODYL 5 MG/1
5 TABLET, DELAYED RELEASE ORAL DAILY PRN
Status: DISCONTINUED | OUTPATIENT
Start: 2024-04-03 | End: 2024-04-03 | Stop reason: HOSPADM

## 2024-04-03 RX ORDER — MAGNESIUM HYDROXIDE 1200 MG/15ML
LIQUID ORAL AS NEEDED
Status: DISCONTINUED | OUTPATIENT
Start: 2024-04-03 | End: 2024-04-03 | Stop reason: HOSPADM

## 2024-04-03 RX ORDER — ONDANSETRON 2 MG/ML
INJECTION INTRAMUSCULAR; INTRAVENOUS AS NEEDED
Status: DISCONTINUED | OUTPATIENT
Start: 2024-04-03 | End: 2024-04-03 | Stop reason: SURG

## 2024-04-03 RX ORDER — AMOXICILLIN 250 MG
2 CAPSULE ORAL 2 TIMES DAILY PRN
Status: DISCONTINUED | OUTPATIENT
Start: 2024-04-03 | End: 2024-04-03 | Stop reason: HOSPADM

## 2024-04-03 RX ORDER — MORPHINE SULFATE 2 MG/ML
1 INJECTION, SOLUTION INTRAMUSCULAR; INTRAVENOUS EVERY 4 HOURS PRN
Status: DISCONTINUED | OUTPATIENT
Start: 2024-04-03 | End: 2024-04-03 | Stop reason: HOSPADM

## 2024-04-03 RX ORDER — SULFAMETHOXAZOLE AND TRIMETHOPRIM 800; 160 MG/1; MG/1
1 TABLET ORAL 2 TIMES DAILY
Qty: 10 TABLET | Refills: 0 | Status: SHIPPED | OUTPATIENT
Start: 2024-04-03 | End: 2024-04-08

## 2024-04-03 RX ORDER — POLYETHYLENE GLYCOL 3350 17 G/17G
17 POWDER, FOR SOLUTION ORAL DAILY PRN
Status: DISCONTINUED | OUTPATIENT
Start: 2024-04-03 | End: 2024-04-03 | Stop reason: HOSPADM

## 2024-04-03 RX ORDER — NALOXONE HCL 0.4 MG/ML
0.4 VIAL (ML) INJECTION
Status: DISCONTINUED | OUTPATIENT
Start: 2024-04-03 | End: 2024-04-03

## 2024-04-03 RX ORDER — ONDANSETRON 2 MG/ML
4 INJECTION INTRAMUSCULAR; INTRAVENOUS ONCE
Status: DISCONTINUED | OUTPATIENT
Start: 2024-04-03 | End: 2024-04-03 | Stop reason: HOSPADM

## 2024-04-03 RX ORDER — SODIUM CHLORIDE 0.9 % (FLUSH) 0.9 %
10 SYRINGE (ML) INJECTION AS NEEDED
Status: DISCONTINUED | OUTPATIENT
Start: 2024-04-03 | End: 2024-04-03 | Stop reason: HOSPADM

## 2024-04-03 RX ORDER — NALOXONE HCL 0.4 MG/ML
0.4 VIAL (ML) INJECTION
Status: DISCONTINUED | OUTPATIENT
Start: 2024-04-03 | End: 2024-04-03 | Stop reason: HOSPADM

## 2024-04-03 RX ORDER — DEXAMETHASONE SODIUM PHOSPHATE 4 MG/ML
INJECTION, SOLUTION INTRA-ARTICULAR; INTRALESIONAL; INTRAMUSCULAR; INTRAVENOUS; SOFT TISSUE AS NEEDED
Status: DISCONTINUED | OUTPATIENT
Start: 2024-04-03 | End: 2024-04-03 | Stop reason: SURG

## 2024-04-03 RX ORDER — MORPHINE SULFATE 2 MG/ML
2 INJECTION, SOLUTION INTRAMUSCULAR; INTRAVENOUS
Status: DISCONTINUED | OUTPATIENT
Start: 2024-04-03 | End: 2024-04-03

## 2024-04-03 RX ORDER — MEPERIDINE HYDROCHLORIDE 25 MG/ML
12.5 INJECTION INTRAMUSCULAR; INTRAVENOUS; SUBCUTANEOUS
Status: DISCONTINUED | OUTPATIENT
Start: 2024-04-03 | End: 2024-04-03

## 2024-04-03 RX ORDER — SODIUM CHLORIDE 9 MG/ML
40 INJECTION, SOLUTION INTRAVENOUS AS NEEDED
Status: DISCONTINUED | OUTPATIENT
Start: 2024-04-03 | End: 2024-04-03 | Stop reason: HOSPADM

## 2024-04-03 RX ADMIN — Medication 10 ML: at 09:04

## 2024-04-03 RX ADMIN — SULFAMETHOXAZOLE AND TRIMETHOPRIM 160 MG OF TRIMETHOPRIM: 80; 16 INJECTION, SOLUTION, CONCENTRATE INTRAVENOUS at 09:04

## 2024-04-03 RX ADMIN — LIDOCAINE HYDROCHLORIDE 100 MG: 20 INJECTION, SOLUTION INTRAVENOUS at 00:24

## 2024-04-03 RX ADMIN — PROPOFOL 150 MG: 10 INJECTION, EMULSION INTRAVENOUS at 00:24

## 2024-04-03 RX ADMIN — Medication 10 ML: at 03:10

## 2024-04-03 RX ADMIN — ONDANSETRON HYDROCHLORIDE 4 MG: 2 SOLUTION INTRAMUSCULAR; INTRAVENOUS at 00:37

## 2024-04-03 RX ADMIN — DEXAMETHASONE SODIUM PHOSPHATE 4 MG: 4 INJECTION, SOLUTION INTRAMUSCULAR; INTRAVENOUS at 00:37

## 2024-04-03 RX ADMIN — SODIUM CHLORIDE 1000 ML: 9 INJECTION, SOLUTION INTRAVENOUS at 03:20

## 2024-04-04 LAB — BACTERIA SPEC AEROBE CULT: NO GROWTH

## 2024-04-08 LAB
CALCIUM OXALATE DIHYDRATE MFR STONE IR: 20 %
COLOR STONE: NORMAL
COM MFR STONE: 70 %
COMPN STONE: NORMAL
HYDROXYAPATITE: 10 %
LABORATORY COMMENT REPORT: NORMAL
LABORATORY COMMENT REPORT: NORMAL
Lab: NORMAL
Lab: NORMAL
PHOTO: NORMAL
SIZE STONE: NORMAL MM
SPEC SOURCE SUBJ: NORMAL
STONE ANALYSIS-IMP: NORMAL
WT STONE: 455 MG

## 2024-04-11 ENCOUNTER — PROCEDURE VISIT (OUTPATIENT)
Dept: UROLOGY | Facility: CLINIC | Age: 79
End: 2024-04-11
Payer: MEDICARE

## 2024-04-11 VITALS
OXYGEN SATURATION: 91 % | HEIGHT: 66 IN | HEART RATE: 91 BPM | DIASTOLIC BLOOD PRESSURE: 50 MMHG | SYSTOLIC BLOOD PRESSURE: 118 MMHG | BODY MASS INDEX: 29.89 KG/M2 | WEIGHT: 186 LBS

## 2024-04-11 DIAGNOSIS — N13.30 HYDRONEPHROSIS, UNSPECIFIED HYDRONEPHROSIS TYPE: ICD-10-CM

## 2024-04-11 DIAGNOSIS — T19.9XXA FOREIGN BODY IN GENITOURINARY TRACT, INITIAL ENCOUNTER: Primary | ICD-10-CM

## 2024-05-17 ENCOUNTER — HOSPITAL ENCOUNTER (OUTPATIENT)
Dept: ULTRASOUND IMAGING | Facility: HOSPITAL | Age: 79
Discharge: HOME | End: 2024-05-17
Payer: MEDICARE

## 2024-05-17 DIAGNOSIS — N13.30 HYDRONEPHROSIS, UNSPECIFIED HYDRONEPHROSIS TYPE: ICD-10-CM

## 2024-05-17 PROCEDURE — 76775 US EXAM ABDO BACK WALL LIM: CPT

## 2024-05-23 ENCOUNTER — OFFICE VISIT (OUTPATIENT)
Dept: UROLOGY | Facility: CLINIC | Age: 79
End: 2024-05-23
Payer: MEDICARE

## 2024-05-23 VITALS
SYSTOLIC BLOOD PRESSURE: 127 MMHG | BODY MASS INDEX: 29.89 KG/M2 | HEIGHT: 66 IN | WEIGHT: 186 LBS | DIASTOLIC BLOOD PRESSURE: 80 MMHG

## 2024-05-23 DIAGNOSIS — N28.89 CALIECTASIS DETERMINED BY ULTRASOUND OF KIDNEY: ICD-10-CM

## 2024-05-23 DIAGNOSIS — N20.0 CALCIUM OXALATE KIDNEY STONES: Primary | ICD-10-CM

## 2024-05-23 PROCEDURE — 1159F MED LIST DOCD IN RCRD: CPT | Performed by: UROLOGY

## 2024-05-23 PROCEDURE — 1160F RVW MEDS BY RX/DR IN RCRD: CPT | Performed by: UROLOGY

## 2024-05-23 PROCEDURE — 99213 OFFICE O/P EST LOW 20 MIN: CPT | Performed by: UROLOGY

## 2024-07-07 ENCOUNTER — APPOINTMENT (OUTPATIENT)
Dept: GENERAL RADIOLOGY | Facility: HOSPITAL | Age: 79
End: 2024-07-07
Payer: MEDICARE

## 2024-07-07 ENCOUNTER — HOSPITAL ENCOUNTER (EMERGENCY)
Facility: HOSPITAL | Age: 79
Discharge: HOME | End: 2024-07-07
Attending: EMERGENCY MEDICINE | Admitting: EMERGENCY MEDICINE
Payer: MEDICARE

## 2024-07-07 VITALS
HEIGHT: 67 IN | SYSTOLIC BLOOD PRESSURE: 138 MMHG | TEMPERATURE: 97.6 F | OXYGEN SATURATION: 96 % | BODY MASS INDEX: 29.19 KG/M2 | DIASTOLIC BLOOD PRESSURE: 75 MMHG | RESPIRATION RATE: 18 BRPM | HEART RATE: 60 BPM | WEIGHT: 186 LBS
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
## 2024-07-07 DIAGNOSIS — S16.1XXA STRAIN OF NECK MUSCLE, INITIAL ENCOUNTER: ICD-10-CM

## 2024-07-07 DIAGNOSIS — M25.512 ACUTE PAIN OF LEFT SHOULDER: ICD-10-CM

## 2024-07-07 DIAGNOSIS — S46.002A INJURY OF LEFT ROTATOR CUFF, INITIAL ENCOUNTER: ICD-10-CM

## 2024-07-07 PROCEDURE — 73030 X-RAY EXAM OF SHOULDER: CPT

## 2024-07-07 PROCEDURE — 99283 EMERGENCY DEPT VISIT LOW MDM: CPT

## 2024-07-07 PROCEDURE — 72050 X-RAY EXAM NECK SPINE 4/5VWS: CPT

## 2024-07-30 ENCOUNTER — OFFICE VISIT (OUTPATIENT)
Dept: ORTHOPEDIC SURGERY | Facility: CLINIC | Age: 79
End: 2024-07-30
Payer: MEDICARE

## 2024-07-30 VITALS
BODY MASS INDEX: 29.19 KG/M2 | WEIGHT: 186 LBS | HEART RATE: 72 BPM | HEIGHT: 67 IN | OXYGEN SATURATION: 92 % | DIASTOLIC BLOOD PRESSURE: 78 MMHG | SYSTOLIC BLOOD PRESSURE: 125 MMHG

## 2024-07-30 DIAGNOSIS — M25.552 LEFT HIP PAIN: ICD-10-CM

## 2024-07-30 DIAGNOSIS — M19.012 OSTEOARTHRITIS OF LEFT SHOULDER, UNSPECIFIED OSTEOARTHRITIS TYPE: ICD-10-CM

## 2024-07-30 DIAGNOSIS — M25.551 RIGHT HIP PAIN: ICD-10-CM

## 2024-07-30 DIAGNOSIS — M25.512 LEFT SHOULDER PAIN, UNSPECIFIED CHRONICITY: Primary | ICD-10-CM

## 2024-07-30 PROCEDURE — 96372 THER/PROPH/DIAG INJ SC/IM: CPT | Performed by: ORTHOPAEDIC SURGERY

## 2024-07-30 PROCEDURE — 99213 OFFICE O/P EST LOW 20 MIN: CPT | Performed by: ORTHOPAEDIC SURGERY

## 2024-07-30 RX ORDER — MELOXICAM 15 MG/1
15 TABLET ORAL DAILY
Qty: 30 TABLET | Refills: 1 | Status: SHIPPED | OUTPATIENT
Start: 2024-07-30

## 2024-07-30 RX ORDER — DEXAMETHASONE SODIUM PHOSPHATE 4 MG/ML
8 INJECTION, SOLUTION INTRA-ARTICULAR; INTRALESIONAL; INTRAMUSCULAR; INTRAVENOUS; SOFT TISSUE ONCE
Status: COMPLETED | OUTPATIENT
Start: 2024-07-30 | End: 2024-07-30

## 2024-07-30 RX ADMIN — DEXAMETHASONE SODIUM PHOSPHATE 8 MG: 4 INJECTION, SOLUTION INTRA-ARTICULAR; INTRALESIONAL; INTRAMUSCULAR; INTRAVENOUS; SOFT TISSUE at 10:47

## 2024-08-13 ENCOUNTER — HOSPITAL ENCOUNTER (OUTPATIENT)
Dept: CT IMAGING | Facility: HOSPITAL | Age: 79
Discharge: HOME | End: 2024-08-13
Admitting: UROLOGY
Payer: MEDICARE

## 2024-08-13 DIAGNOSIS — N20.0 CALCIUM OXALATE KIDNEY STONES: ICD-10-CM

## 2024-08-13 DIAGNOSIS — N28.89 CALIECTASIS DETERMINED BY ULTRASOUND OF KIDNEY: ICD-10-CM

## 2024-08-13 PROCEDURE — 74176 CT ABD & PELVIS W/O CONTRAST: CPT

## 2024-08-16 PROBLEM — M25.371 ANKLE INSTABILITY, RIGHT: Status: ACTIVE | Noted: 2020-02-24

## 2024-08-16 PROBLEM — L40.9 PSORIASIS: Status: ACTIVE | Noted: 2018-03-26

## 2024-08-16 PROBLEM — R13.19 ESOPHAGEAL DYSPHAGIA: Status: ACTIVE | Noted: 2022-03-11

## 2024-08-16 PROBLEM — M54.16 BILATERAL LUMBAR RADICULOPATHY: Status: ACTIVE | Noted: 2024-08-16

## 2024-08-16 PROBLEM — J84.10 PULMONARY FIBROSIS: Status: ACTIVE | Noted: 2023-11-20

## 2024-08-16 PROBLEM — G47.33 OSA (OBSTRUCTIVE SLEEP APNEA): Status: ACTIVE | Noted: 2024-05-20

## 2024-08-16 PROBLEM — G62.9 NEUROPATHY: Status: ACTIVE | Noted: 2024-08-16

## 2024-08-16 RX ORDER — BACLOFEN 20 MG/1
TABLET ORAL
COMMUNITY
Start: 2024-08-08

## 2024-08-20 ENCOUNTER — OFFICE VISIT (OUTPATIENT)
Dept: UROLOGY | Facility: CLINIC | Age: 79
End: 2024-08-20
Payer: MEDICARE

## 2024-08-20 ENCOUNTER — OFFICE VISIT (OUTPATIENT)
Dept: INTERNAL MEDICINE | Age: 79
End: 2024-08-20
Payer: MEDICARE

## 2024-08-20 VITALS
HEART RATE: 72 BPM | WEIGHT: 187 LBS | BODY MASS INDEX: 29.35 KG/M2 | DIASTOLIC BLOOD PRESSURE: 82 MMHG | SYSTOLIC BLOOD PRESSURE: 168 MMHG | HEIGHT: 67 IN

## 2024-08-20 VITALS
WEIGHT: 190.6 LBS | BODY MASS INDEX: 29.91 KG/M2 | RESPIRATION RATE: 18 BRPM | HEIGHT: 67 IN | SYSTOLIC BLOOD PRESSURE: 152 MMHG | TEMPERATURE: 97.6 F | HEART RATE: 68 BPM | OXYGEN SATURATION: 97 % | DIASTOLIC BLOOD PRESSURE: 84 MMHG

## 2024-08-20 DIAGNOSIS — J84.10 PULMONARY FIBROSIS: ICD-10-CM

## 2024-08-20 DIAGNOSIS — I10 ESSENTIAL HYPERTENSION: ICD-10-CM

## 2024-08-20 DIAGNOSIS — E55.9 VITAMIN D DEFICIENCY: ICD-10-CM

## 2024-08-20 DIAGNOSIS — F41.9 ANXIETY: ICD-10-CM

## 2024-08-20 DIAGNOSIS — J84.9 ILD (INTERSTITIAL LUNG DISEASE): ICD-10-CM

## 2024-08-20 DIAGNOSIS — N20.0 CALCIUM OXALATE KIDNEY STONES: Primary | ICD-10-CM

## 2024-08-20 DIAGNOSIS — N32.81 OAB (OVERACTIVE BLADDER): ICD-10-CM

## 2024-08-20 DIAGNOSIS — G62.9 NEUROPATHY: ICD-10-CM

## 2024-08-20 DIAGNOSIS — R73.03 PREDIABETES: ICD-10-CM

## 2024-08-20 DIAGNOSIS — Z76.89 ENCOUNTER TO ESTABLISH CARE: Primary | ICD-10-CM

## 2024-08-20 DIAGNOSIS — M81.0 OSTEOPOROSIS, UNSPECIFIED OSTEOPOROSIS TYPE, UNSPECIFIED PATHOLOGICAL FRACTURE PRESENCE: ICD-10-CM

## 2024-08-20 DIAGNOSIS — N81.6 RECTOCELE: ICD-10-CM

## 2024-08-20 DIAGNOSIS — K21.9 GASTROESOPHAGEAL REFLUX DISEASE WITHOUT ESOPHAGITIS: ICD-10-CM

## 2024-08-20 DIAGNOSIS — Z79.899 MEDICATION MANAGEMENT: ICD-10-CM

## 2024-08-20 DIAGNOSIS — Z29.11 NEED FOR RSV IMMUNIZATION: ICD-10-CM

## 2024-08-20 DIAGNOSIS — G47.33 OSA (OBSTRUCTIVE SLEEP APNEA): ICD-10-CM

## 2024-08-20 DIAGNOSIS — Z23 NEED FOR PNEUMOCOCCAL VACCINE: ICD-10-CM

## 2024-08-20 PROBLEM — R13.19 ESOPHAGEAL DYSPHAGIA: Status: RESOLVED | Noted: 2022-03-11 | Resolved: 2024-08-20

## 2024-08-20 LAB
AMPHET+METHAMPHET UR QL: NEGATIVE
AMPHETAMINE INTERNAL CONTROL: ABNORMAL
AMPHETAMINES UR QL: NEGATIVE
BARBITURATE INTERNAL CONTROL: ABNORMAL
BARBITURATES UR QL SCN: NEGATIVE
BENZODIAZ UR QL SCN: NEGATIVE
BENZODIAZEPINE INTERNAL CONTROL: ABNORMAL
BUPRENORPHINE INTERNAL CONTROL: ABNORMAL
BUPRENORPHINE SERPL-MCNC: NEGATIVE NG/ML
CANNABINOIDS SERPL QL: POSITIVE
COCAINE INTERNAL CONTROL: ABNORMAL
COCAINE UR QL: NEGATIVE
EXPIRATION DATE: ABNORMAL
Lab: ABNORMAL
MDMA (ECSTASY) INTERNAL CONTROL: ABNORMAL
MDMA UR QL SCN: NEGATIVE
METHADONE INTERNAL CONTROL: ABNORMAL
METHADONE UR QL SCN: NEGATIVE
METHAMPHETAMINE INTERNAL CONTROL: ABNORMAL
MORPHINE INTERNAL CONTROL: ABNORMAL
MORPHINE/OPIATES SCREEN, URINE: NEGATIVE
OXYCODONE INTERNAL CONTROL: ABNORMAL
OXYCODONE UR QL SCN: NEGATIVE
PCP UR QL SCN: NEGATIVE
PHENCYCLIDINE INTERNAL CONTROL: ABNORMAL
THC INTERNAL CONTROL: ABNORMAL

## 2024-08-20 PROCEDURE — G0009 ADMIN PNEUMOCOCCAL VACCINE: HCPCS

## 2024-08-20 PROCEDURE — 1160F RVW MEDS BY RX/DR IN RCRD: CPT

## 2024-08-20 PROCEDURE — 90677 PCV20 VACCINE IM: CPT

## 2024-08-20 PROCEDURE — 3077F SYST BP >= 140 MM HG: CPT

## 2024-08-20 PROCEDURE — 99204 OFFICE O/P NEW MOD 45 MIN: CPT

## 2024-08-20 PROCEDURE — 1159F MED LIST DOCD IN RCRD: CPT

## 2024-08-20 PROCEDURE — 3079F DIAST BP 80-89 MM HG: CPT

## 2024-08-20 RX ORDER — LISINOPRIL 30 MG/1
30 TABLET ORAL TAKE AS DIRECTED
Qty: 90 TABLET | Refills: 1 | Status: SHIPPED | OUTPATIENT
Start: 2024-08-20 | End: 2025-05-21

## 2024-08-20 RX ORDER — CITALOPRAM HYDROBROMIDE 10 MG/1
10 TABLET ORAL DAILY
Qty: 90 TABLET | Refills: 1 | Status: SHIPPED | OUTPATIENT
Start: 2024-08-20

## 2024-08-26 ENCOUNTER — TELEPHONE (OUTPATIENT)
Dept: INTERNAL MEDICINE | Age: 79
End: 2024-08-26

## 2024-08-26 ENCOUNTER — TELEPHONE (OUTPATIENT)
Dept: INTERNAL MEDICINE | Age: 79
End: 2024-08-26
Payer: MEDICARE

## 2024-08-27 DIAGNOSIS — F41.9 ANXIETY: Primary | ICD-10-CM

## 2024-08-27 RX ORDER — LISINOPRIL 10 MG/1
10 TABLET ORAL DAILY
Qty: 90 TABLET | Refills: 1 | Status: SHIPPED | OUTPATIENT
Start: 2024-08-27

## 2024-08-27 RX ORDER — CITALOPRAM HYDROBROMIDE 10 MG/1
20 TABLET ORAL DAILY
Qty: 90 TABLET | Refills: 1 | Status: SHIPPED | OUTPATIENT
Start: 2024-08-27 | End: 2024-08-27

## 2024-08-30 ENCOUNTER — TELEPHONE (OUTPATIENT)
Dept: INTERNAL MEDICINE | Age: 79
End: 2024-08-30

## 2024-08-30 ENCOUNTER — LAB (OUTPATIENT)
Dept: INTERNAL MEDICINE | Age: 79
End: 2024-08-30
Payer: MEDICARE

## 2024-08-30 DIAGNOSIS — R73.03 PREDIABETES: ICD-10-CM

## 2024-08-30 DIAGNOSIS — E55.9 VITAMIN D DEFICIENCY: ICD-10-CM

## 2024-08-30 DIAGNOSIS — F41.9 ANXIETY: ICD-10-CM

## 2024-08-30 DIAGNOSIS — I10 ESSENTIAL HYPERTENSION: ICD-10-CM

## 2024-08-30 DIAGNOSIS — J84.10 PULMONARY FIBROSIS: ICD-10-CM

## 2024-08-30 PROCEDURE — 82607 VITAMIN B-12: CPT

## 2024-08-30 PROCEDURE — 80053 COMPREHEN METABOLIC PANEL: CPT

## 2024-08-30 PROCEDURE — 84439 ASSAY OF FREE THYROXINE: CPT

## 2024-08-30 PROCEDURE — 82306 VITAMIN D 25 HYDROXY: CPT

## 2024-08-30 PROCEDURE — 84443 ASSAY THYROID STIM HORMONE: CPT

## 2024-08-30 PROCEDURE — 83036 HEMOGLOBIN GLYCOSYLATED A1C: CPT

## 2024-08-30 PROCEDURE — 80061 LIPID PANEL: CPT

## 2024-08-30 PROCEDURE — 36415 COLL VENOUS BLD VENIPUNCTURE: CPT

## 2024-08-30 PROCEDURE — 82746 ASSAY OF FOLIC ACID SERUM: CPT

## 2024-08-30 PROCEDURE — 85025 COMPLETE CBC W/AUTO DIFF WBC: CPT

## 2024-08-31 LAB
25(OH)D3 SERPL-MCNC: 52.6 NG/ML (ref 30–100)
ALBUMIN SERPL-MCNC: 3.9 G/DL (ref 3.5–5.2)
ALBUMIN/GLOB SERPL: 1.3 G/DL
ALP SERPL-CCNC: 60 U/L (ref 39–117)
ALT SERPL W P-5'-P-CCNC: 15 U/L (ref 1–33)
ANION GAP SERPL CALCULATED.3IONS-SCNC: 11 MMOL/L (ref 5–15)
AST SERPL-CCNC: 30 U/L (ref 1–32)
BASOPHILS # BLD AUTO: 0.03 10*3/MM3 (ref 0–0.2)
BASOPHILS NFR BLD AUTO: 0.5 % (ref 0–1.5)
BILIRUB SERPL-MCNC: 0.6 MG/DL (ref 0–1.2)
BUN SERPL-MCNC: 14 MG/DL (ref 8–23)
BUN/CREAT SERPL: 16.7 (ref 7–25)
CALCIUM SPEC-SCNC: 9.3 MG/DL (ref 8.6–10.5)
CHLORIDE SERPL-SCNC: 105 MMOL/L (ref 98–107)
CHOLEST SERPL-MCNC: 156 MG/DL (ref 0–200)
CO2 SERPL-SCNC: 22 MMOL/L (ref 22–29)
CREAT SERPL-MCNC: 0.84 MG/DL (ref 0.57–1)
DEPRECATED RDW RBC AUTO: 42.1 FL (ref 37–54)
EGFRCR SERPLBLD CKD-EPI 2021: 70.8 ML/MIN/1.73
EOSINOPHIL # BLD AUTO: 0.29 10*3/MM3 (ref 0–0.4)
EOSINOPHIL NFR BLD AUTO: 5 % (ref 0.3–6.2)
ERYTHROCYTE [DISTWIDTH] IN BLOOD BY AUTOMATED COUNT: 12.7 % (ref 12.3–15.4)
FOLATE SERPL-MCNC: 9.53 NG/ML (ref 4.78–24.2)
GLOBULIN UR ELPH-MCNC: 3 GM/DL
GLUCOSE SERPL-MCNC: 87 MG/DL (ref 65–99)
HBA1C MFR BLD: 5.6 % (ref 4.8–5.6)
HCT VFR BLD AUTO: 49.6 % (ref 34–46.6)
HDLC SERPL-MCNC: 46 MG/DL (ref 40–60)
HGB BLD-MCNC: 16 G/DL (ref 12–15.9)
IMM GRANULOCYTES # BLD AUTO: 0.01 10*3/MM3 (ref 0–0.05)
IMM GRANULOCYTES NFR BLD AUTO: 0.2 % (ref 0–0.5)
LDLC SERPL CALC-MCNC: 94 MG/DL (ref 0–100)
LDLC/HDLC SERPL: 2.02 {RATIO}
LYMPHOCYTES # BLD AUTO: 2.21 10*3/MM3 (ref 0.7–3.1)
LYMPHOCYTES NFR BLD AUTO: 38.2 % (ref 19.6–45.3)
MCH RBC QN AUTO: 29.3 PG (ref 26.6–33)
MCHC RBC AUTO-ENTMCNC: 32.3 G/DL (ref 31.5–35.7)
MCV RBC AUTO: 90.7 FL (ref 79–97)
MONOCYTES # BLD AUTO: 0.34 10*3/MM3 (ref 0.1–0.9)
MONOCYTES NFR BLD AUTO: 5.9 % (ref 5–12)
NEUTROPHILS NFR BLD AUTO: 2.91 10*3/MM3 (ref 1.7–7)
NEUTROPHILS NFR BLD AUTO: 50.2 % (ref 42.7–76)
NRBC BLD AUTO-RTO: 0 /100 WBC (ref 0–0.2)
PLATELET # BLD AUTO: 232 10*3/MM3 (ref 140–450)
PMV BLD AUTO: 10.3 FL (ref 6–12)
POTASSIUM SERPL-SCNC: 4.5 MMOL/L (ref 3.5–5.2)
PROT SERPL-MCNC: 6.9 G/DL (ref 6–8.5)
RBC # BLD AUTO: 5.47 10*6/MM3 (ref 3.77–5.28)
SODIUM SERPL-SCNC: 138 MMOL/L (ref 136–145)
T4 FREE SERPL-MCNC: 1.19 NG/DL (ref 0.92–1.68)
TRIGL SERPL-MCNC: 86 MG/DL (ref 0–150)
TSH SERPL DL<=0.05 MIU/L-ACNC: 1.08 UIU/ML (ref 0.27–4.2)
VIT B12 BLD-MCNC: >2000 PG/ML (ref 211–946)
VLDLC SERPL-MCNC: 16 MG/DL (ref 5–40)
WBC NRBC COR # BLD AUTO: 5.79 10*3/MM3 (ref 3.4–10.8)

## 2024-09-04 ENCOUNTER — TELEPHONE (OUTPATIENT)
Dept: INTERNAL MEDICINE | Age: 79
End: 2024-09-04
Payer: MEDICARE

## 2024-09-04 DIAGNOSIS — G62.9 NEUROPATHY: Primary | ICD-10-CM

## 2024-09-06 ENCOUNTER — TELEPHONE (OUTPATIENT)
Dept: INTERNAL MEDICINE | Age: 79
End: 2024-09-06
Payer: MEDICARE

## 2024-09-09 ENCOUNTER — PATIENT ROUNDING (BHMG ONLY) (OUTPATIENT)
Dept: INTERNAL MEDICINE | Age: 79
End: 2024-09-09
Payer: MEDICARE

## 2024-09-23 ENCOUNTER — OFFICE VISIT (OUTPATIENT)
Dept: INTERNAL MEDICINE | Age: 79
End: 2024-09-23
Payer: MEDICARE

## 2024-09-23 VITALS
SYSTOLIC BLOOD PRESSURE: 122 MMHG | WEIGHT: 180 LBS | HEART RATE: 60 BPM | BODY MASS INDEX: 28.25 KG/M2 | TEMPERATURE: 97.5 F | OXYGEN SATURATION: 96 % | HEIGHT: 67 IN | DIASTOLIC BLOOD PRESSURE: 60 MMHG

## 2024-09-23 DIAGNOSIS — Z00.00 MEDICARE ANNUAL WELLNESS VISIT, SUBSEQUENT: Primary | ICD-10-CM

## 2024-09-23 DIAGNOSIS — F33.41 RECURRENT MAJOR DEPRESSION IN PARTIAL REMISSION: ICD-10-CM

## 2024-09-23 DIAGNOSIS — G62.9 NEUROPATHY: ICD-10-CM

## 2024-09-23 DIAGNOSIS — E55.9 VITAMIN D DEFICIENCY: ICD-10-CM

## 2024-09-23 DIAGNOSIS — I10 ESSENTIAL HYPERTENSION: ICD-10-CM

## 2024-09-23 DIAGNOSIS — D75.1 POLYCYTHEMIA: ICD-10-CM

## 2024-09-23 DIAGNOSIS — E83.19 IRON OVERLOAD: ICD-10-CM

## 2024-09-23 DIAGNOSIS — E53.8 VITAMIN B12 DEFICIENCY: ICD-10-CM

## 2024-09-23 DIAGNOSIS — K21.9 GASTROESOPHAGEAL REFLUX DISEASE WITHOUT ESOPHAGITIS: ICD-10-CM

## 2024-09-23 PROBLEM — M25.371 ANKLE INSTABILITY, RIGHT: Status: RESOLVED | Noted: 2020-02-24 | Resolved: 2024-09-23

## 2024-09-23 PROBLEM — N20.0 KIDNEY STONE: Status: RESOLVED | Noted: 2024-04-02 | Resolved: 2024-09-23

## 2024-09-23 PROBLEM — Z80.0 FAMILY HISTORY OF COLON CANCER IN MOTHER: Status: ACTIVE | Noted: 2024-09-23

## 2024-09-23 PROCEDURE — 1170F FXNL STATUS ASSESSED: CPT | Performed by: INTERNAL MEDICINE

## 2024-09-23 PROCEDURE — 99215 OFFICE O/P EST HI 40 MIN: CPT | Performed by: INTERNAL MEDICINE

## 2024-09-23 PROCEDURE — 1159F MED LIST DOCD IN RCRD: CPT | Performed by: INTERNAL MEDICINE

## 2024-09-23 PROCEDURE — 1160F RVW MEDS BY RX/DR IN RCRD: CPT | Performed by: INTERNAL MEDICINE

## 2024-09-23 PROCEDURE — 3078F DIAST BP <80 MM HG: CPT | Performed by: INTERNAL MEDICINE

## 2024-09-23 PROCEDURE — G0439 PPPS, SUBSEQ VISIT: HCPCS | Performed by: INTERNAL MEDICINE

## 2024-09-23 PROCEDURE — 3074F SYST BP LT 130 MM HG: CPT | Performed by: INTERNAL MEDICINE

## 2024-09-23 RX ORDER — CITALOPRAM HYDROBROMIDE 20 MG/1
20 TABLET ORAL DAILY
Qty: 90 TABLET | Refills: 1 | Status: SHIPPED | OUTPATIENT
Start: 2024-09-23

## 2024-09-23 RX ORDER — CITALOPRAM HYDROBROMIDE 20 MG/1
20 TABLET ORAL DAILY
Qty: 90 TABLET | Refills: 1 | Status: SHIPPED | OUTPATIENT
Start: 2024-09-23 | End: 2024-09-23 | Stop reason: SDUPTHER

## 2024-10-17 ENCOUNTER — TELEPHONE (OUTPATIENT)
Dept: INTERNAL MEDICINE | Age: 79
End: 2024-10-17

## 2024-10-17 NOTE — TELEPHONE ENCOUNTER
Spoke with patient. She doesn't want to go to [place]. She is on a cancellation list with Dr. Moseley.

## 2024-10-17 NOTE — TELEPHONE ENCOUNTER
I sent we referred her to Dr. Moseley, so okay to change the referral to a [facility] neurologist.

## 2024-10-17 NOTE — TELEPHONE ENCOUNTER
Caller: Marci Dudley    Relationship to patient: Self    Best call back number: [phone]    Patient is needing: PATIENT CALLED REQUESTING TO SPEAK WITH CLINICAL STAFF. PATIENT STATES MD MEADE REFERRED HER TO NEUROLOGY BUT THEY ARE UNABLE TO SEE HER UNTIL DECEMBER. PATIENT STATES THE PAIN AND SPASMS ARE GETTING SEVERELY WORSE FROM HER HIPS DOWN. PATIENT STATES IT IS NOW STARTING TO AFFECT HER WALKING. PATIENT DID CALL NEUROLOGY TO LET THEM KNOW BUT THEY ARE UNABLE TO GET PATIENT IN ANY SOONER AT THIS TIME AND RECOMMEND SHE REACH OUT TO HER PCP FOR ANY RECOMMENDATIONS FOR THIS PAIN.

## 2024-11-13 ENCOUNTER — TELEPHONE (OUTPATIENT)
Dept: INTERNAL MEDICINE | Age: 79
End: 2024-11-13
Payer: MEDICARE

## 2024-11-13 NOTE — TELEPHONE ENCOUNTER
Caller: Marci Dudley    Relationship: Self    Best call back number: [phone]     What was the call regarding: PATIENT STATES SHE WOULD LIKE TO KNOW WHERE TO GET HER PROLIA INJECTION AT, AND WHAT DAY AND TIME IS HER INJECTION SUPPOSED TO BE PERFORMED. PATIENT STATES SHE WANTS TO KNOW IF SHE IS ABLE TO HAVE THE LABS DONE THE MORNING OF HER APPOINTMENT OR DOES SHE NEED TO KNOW A COUPLE OF DAYS BEFORE. PATIENT STATES SHE WOULD LIKE A CALL BACK TODAY TO HAVE THESE QUESTIONS ANSWERED.

## 2024-11-14 ENCOUNTER — LAB (OUTPATIENT)
Dept: INTERNAL MEDICINE | Age: 79
End: 2024-11-14
Payer: MEDICARE

## 2024-11-14 DIAGNOSIS — M81.0 OSTEOPOROSIS, UNSPECIFIED OSTEOPOROSIS TYPE, UNSPECIFIED PATHOLOGICAL FRACTURE PRESENCE: ICD-10-CM

## 2024-11-14 LAB
25(OH)D3 SERPL-MCNC: 48.4 NG/ML (ref 30–100)
ALBUMIN SERPL-MCNC: 3.9 G/DL (ref 3.5–5.2)
ALBUMIN/GLOB SERPL: 1.3 G/DL
ALP SERPL-CCNC: 74 U/L (ref 39–117)
ALT SERPL W P-5'-P-CCNC: 15 U/L (ref 1–33)
ANION GAP SERPL CALCULATED.3IONS-SCNC: 10.8 MMOL/L (ref 5–15)
AST SERPL-CCNC: 23 U/L (ref 1–32)
BILIRUB SERPL-MCNC: 0.5 MG/DL (ref 0–1.2)
BUN SERPL-MCNC: 15 MG/DL (ref 8–23)
BUN/CREAT SERPL: 15.5 (ref 7–25)
CALCIUM SPEC-SCNC: 10 MG/DL (ref 8.6–10.5)
CHLORIDE SERPL-SCNC: 105 MMOL/L (ref 98–107)
CO2 SERPL-SCNC: 25.2 MMOL/L (ref 22–29)
CREAT SERPL-MCNC: 0.97 MG/DL (ref 0.57–1)
EGFRCR SERPLBLD CKD-EPI 2021: 59.6 ML/MIN/1.73
GLOBULIN UR ELPH-MCNC: 3 GM/DL
GLUCOSE SERPL-MCNC: 126 MG/DL (ref 65–99)
MAGNESIUM SERPL-MCNC: 2 MG/DL (ref 1.6–2.4)
PHOSPHATE SERPL-MCNC: 3.9 MG/DL (ref 2.5–4.5)
POTASSIUM SERPL-SCNC: 4.3 MMOL/L (ref 3.5–5.2)
PROT SERPL-MCNC: 6.9 G/DL (ref 6–8.5)
SODIUM SERPL-SCNC: 141 MMOL/L (ref 136–145)

## 2024-11-14 PROCEDURE — 82306 VITAMIN D 25 HYDROXY: CPT

## 2024-11-14 PROCEDURE — 36415 COLL VENOUS BLD VENIPUNCTURE: CPT | Performed by: INTERNAL MEDICINE

## 2024-11-14 PROCEDURE — 84100 ASSAY OF PHOSPHORUS: CPT

## 2024-11-14 PROCEDURE — 83735 ASSAY OF MAGNESIUM: CPT

## 2024-11-14 PROCEDURE — 80053 COMPREHEN METABOLIC PANEL: CPT

## 2024-11-21 ENCOUNTER — HOSPITAL ENCOUNTER (OUTPATIENT)
Dept: INFUSION THERAPY | Facility: HOSPITAL | Age: 79
Discharge: HOME | End: 2024-11-21
Payer: MEDICARE

## 2024-11-21 VITALS
TEMPERATURE: 98.7 F | SYSTOLIC BLOOD PRESSURE: 131 MMHG | WEIGHT: 182.98 LBS | DIASTOLIC BLOOD PRESSURE: 69 MMHG | RESPIRATION RATE: 20 BRPM | OXYGEN SATURATION: 100 % | BODY MASS INDEX: 28.72 KG/M2 | HEART RATE: 69 BPM | HEIGHT: 67 IN

## 2024-11-21 DIAGNOSIS — M81.0 OSTEOPOROSIS, UNSPECIFIED OSTEOPOROSIS TYPE, UNSPECIFIED PATHOLOGICAL FRACTURE PRESENCE: Primary | ICD-10-CM

## 2024-11-21 PROCEDURE — 96372 THER/PROPH/DIAG INJ SC/IM: CPT

## 2024-11-21 PROCEDURE — 25010000002 DENOSUMAB 60 MG/ML SOLUTION PREFILLED SYRINGE

## 2024-11-21 RX ADMIN — DENOSUMAB 60 MG: 60 INJECTION SUBCUTANEOUS at 14:23

## 2024-12-06 ENCOUNTER — APPOINTMENT (OUTPATIENT)
Dept: GENERAL RADIOLOGY | Facility: HOSPITAL | Age: 79
End: 2024-12-06
Payer: MEDICARE

## 2024-12-06 ENCOUNTER — HOSPITAL ENCOUNTER (EMERGENCY)
Facility: HOSPITAL | Age: 79
Discharge: HOME | End: 2024-12-06
Attending: EMERGENCY MEDICINE
Payer: MEDICARE

## 2024-12-06 ENCOUNTER — TELEPHONE (OUTPATIENT)
Dept: ORTHOPEDIC SURGERY | Facility: CLINIC | Age: 79
End: 2024-12-06
Payer: MEDICARE

## 2024-12-06 VITALS
TEMPERATURE: 97.7 F | SYSTOLIC BLOOD PRESSURE: 145 MMHG | HEIGHT: 67 IN | OXYGEN SATURATION: 96 % | BODY MASS INDEX: 28.44 KG/M2 | DIASTOLIC BLOOD PRESSURE: 69 MMHG | RESPIRATION RATE: 16 BRPM | HEART RATE: 67 BPM | WEIGHT: 181.22 LBS
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
## 2024-12-06 DIAGNOSIS — S00.83XA CONTUSION OF FACE, INITIAL ENCOUNTER: ICD-10-CM

## 2024-12-06 DIAGNOSIS — M25.531 RIGHT WRIST PAIN: ICD-10-CM

## 2024-12-06 PROCEDURE — 99283 EMERGENCY DEPT VISIT LOW MDM: CPT

## 2024-12-06 PROCEDURE — 73110 X-RAY EXAM OF WRIST: CPT

## 2024-12-06 NOTE — TELEPHONE ENCOUNTER
Caller: ELIAN    Relationship to patient: SELF    Best call back number: [phone]    Chief complaint: RIGHT WRIST PAIN    Type of visit: NEW PROBLEM APPT- SHE FELL 12/5/24- RIGHT WRIST PAIN/SWELLING- UNABLE TO USE HAND BUT CAN MOVE FINGERS A LITTLE- ESTABLISHED WITH DR CHUA KN[facility]IP- FIRST AVAILABLE 12/13/24- WANTS TO BE SEEN SOONER- PLEASE CALL

## 2024-12-06 NOTE — DISCHARGE INSTRUCTIONS
Your x-ray completed in the emergency department today does not show any fracture however I do have concern for potential injury to your scaphoid bone because of where you are tender.  I am placing you in a brace and please wear this for 1 week and then have a repeat x-ray of your wrist completed at that time as sometimes scaphoid fractures do not show immediately on x-ray.  Alternate Tylenol and ibuprofen for pain control.  Apply ice to the area 15 to 20 minutes at a time 4-5 times a day to help with pain and swelling as well.

## 2024-12-06 NOTE — ED PROVIDER NOTES
Time: 4:31 PM EST  Date of encounter:  12/6/2024  Independent Historian/Clinical History and Information was obtained by:   Patient and Family    History is limited by: N/A    Chief Complaint: Wrist pain      History of Present Illness:  Patient is a 79 y.o. year old female who presents to the emergency department for evaluation of wrist pain.  Patient presents to the emergency department today for evaluation of wrist pain.  She states yesterday she tripped and fell causing her to fall onto her right side.  She states that she hit her head but did not have any syncope.  She states initially she had some bruising and swelling to her forehead but applied ice to this and it improved.  She is not having headache or dizziness.  She is complaining of right wrist pain that goes into the hands.  She denies any previous injury to the area.  She states that she is having some swelling and bruising associated.  She states the pain has been worsening throughout the day.      Patient Care Team  Primary Care Provider: Conor Mosquera MD    Past Medical History:     Allergies   Allergen Reactions    Benzoin Anaphylaxis and Hives    Ciprofloxacin Hives and Shortness Of Breath    Iodine Anaphylaxis    Penicillins Anaphylaxis, Itching and Swelling    Adhesive Tape Rash    Latex Hives and Rash    Levofloxacin Rash     Past Medical History:   Diagnosis Date    Abnormal Pap smear of cervix 1993    Cartorized lo    Anxiety     Arthritis     Bilateral lumbar radiculopathy     Breast cancer     NO STICK/BP ON RIGHT ARM    Cancer 2005    Breast returned 2012 had Both Breast removed. Radiation 2005 cancer pill 2012- 2019    Cholelithiasis 1971    Removed in 1971    Chronic cough     Chronic kidney disease     Colon polyp 2000    Tested removed no cancer    Depression 2012    Treated with citalopram hydrobromide 10mg    Diverticulosis 2009    Flare-up in 2023 went to emergency 00    Esophageal dysphagia     Falls     R ankle unstable     Fibromyalgia, primary 2013    My cancer doctor Dr Srivastava put me on Gabapentin 300mg    GERD (gastroesophageal reflux disease)     HISTORY OF    Hiatal hernia     History of gangrene     AS A CHILD    History of kidney stones     History of transfusion     AS A CHILD    HL (hearing loss) 2020    Wear hearing aids from [employer]    Hypertension     Incontinence     Kidney infection     HISTORY OF MULTIPLE    Kidney stone 2009    No problem till 2023    Low back pain     Lymphedema     RIGHT ARM    Neuropathy     Nonsenile cataract     OAB (overactive bladder)     Osteopenia     Prediabetes     Psoriasis     Pulmonary embolism 2021    Have post radiation pulmonary Fibrosis.  See lung doctor also use c pap now.    Pulmonary fibrosis     Renal insufficiency 1971    Surgery on louiser R kidney was full of scare tissues    Right ankle instability     right ankle and leg instabilty    Sleep apnea     CPAP    Urinary incontinence 2001    Need #6 pads 2 or 3 times daily.    Visual impairment 1990    Wear glasses have skin from eye lid that hangs over pupils     Past Surgical History:   Procedure Laterality Date    APPENDECTOMY      BLADDER SURGERY  2024    Surgery Jan, March & 2times April 2&3    BREAST LUMPECTOMY Right 2005    CARDIAC CATHETERIZATION      CHOLECYSTECTOMY      COLONOSCOPY  2018    Saw doctor Aug. 2024, refered me to specialists due to a problem.    CYSTOSCOPY URETEROSCOPY LASER LITHOTRIPSY Right 04/02/2024    Procedure: RIGHT  URETEROSCOPY  STONE BASKET EXTRACTION AND RIGHT STENT removal;  Surgeon: Jl Bergeron MD;  Location: [facility] MAIN OR;  Service: Urology;  Laterality: Right;    CYSTOSCOPY W/ URETERAL STENT PLACEMENT      CYSTOSCOPY W/ URETERAL STENT PLACEMENT Right 04/03/2024    Procedure: CYSTOSCOPY, RIGHT RETROGRADE,  URETERAL CATHETER/STENT INSERTION;  Surgeon: Tiffanie Zhu MD;  Location: [facility] MAIN OR;  Service: Urology;  Laterality: Right;    ENDOSCOPY      egd & dilitation     FRACTURE SURGERY      Compound broken R arm.    HEMORRHOIDECTOMY      HERNIA REPAIR  ?    RECTAL hernia    HIP SURGERY Left     BONE SPUR    KIDNEY STONE SURGERY      Listed above  & April    KIDNEY SURGERY      LEG SURGERY Bilateral     broken leg    MASTECTOMY Bilateral 2012    RECTOCELE REPAIR      URETEROSCOPY LASER LITHOTRIPSY WITH STENT INSERTION Right 2024    Procedure: RIGHT URETEROSCOPY LASER LITHOTRIPSY, STONE BASKET EXTRACTION AND RIGHT STENT EXCHANGE;  Surgeon: Jl Bergeron MD;  Location: [facility] MAIN OR;  Service: Urology;  Laterality: Right;     Family History   Problem Relation Age of Onset    Cancer Mother         Colon cancer  twice    Cancer Father         Lung  1970 57yrs old    Arthritis Maternal Aunt         96 yrs old all over body hands bad.    Arthritis Sister         Same as me 2 kind    Cancer Sister         Breast cancer and Skin cancer bad has a blood type has white cells added to body once a month dialysis    Cancer Sister         Lung  at 37yrs old    Malig Hyperthermia Neg Hx        Home Medications:  Prior to Admission medications    Medication Sig Start Date End Date Taking? Authorizing Provider   aspirin 81 MG EC tablet Take 1 tablet by mouth Daily.    Lulú Nagel MD   baclofen (LIORESAL) 20 MG tablet Take 1 tablet by mouth 2 (Two) Times a Day. 24   Lulú Nagel MD   citalopram (CeleXA) 20 MG tablet Take 1 tablet by mouth Daily. 24   Conor Mosquera MD   gabapentin (NEURONTIN) 300 MG capsule Take 1 capsule by mouth 3 (Three) Times a Day. 22   Lulú Nagel MD   lisinopril (PRINIVIL,ZESTRIL) 10 MG tablet Take 1 tablet by mouth Daily. 24   Stacy Cruz APRN   oxybutynin (DITROPAN) 5 MG tablet Take 1 tablet by mouth 2 (Two) Times a Day.    Lulú Nagel MD   Unable to find Take 2 each by mouth Every Night. SCIATISE    Lulú Nagel MD        Social History:   Social History  "    Tobacco Use    Smoking status: Never     Passive exposure: Never    Smokeless tobacco: Never   Vaping Use    Vaping status: Never Used   Substance Use Topics    Alcohol use: Never    Drug use: Never         Review of Systems:  Review of Systems   Musculoskeletal:  Positive for arthralgias and joint swelling.   Skin:  Negative for wound.   Neurological:  Negative for dizziness, syncope and headaches.        Physical Exam:  /69 (BP Location: Left arm, Patient Position: Sitting)   Pulse 67   Temp 97.7 °F (36.5 °C) (Oral)   Resp 16   Ht 170.2 cm (67\")   Wt 82.2 kg (181 lb 3.5 oz)   SpO2 96%   BMI 28.38 kg/m²     Physical Exam  Vitals and nursing note reviewed.   Constitutional:       Appearance: Normal appearance.   HENT:      Head: Normocephalic. Contusion present. No raccoon eyes, Acevedo's sign or laceration.        Nose: Nose normal.   Eyes:      Conjunctiva/sclera: Conjunctivae normal.   Cardiovascular:      Rate and Rhythm: Normal rate and regular rhythm.      Heart sounds: Normal heart sounds.   Pulmonary:      Effort: Pulmonary effort is normal.      Breath sounds: Normal breath sounds.   Musculoskeletal:      Right wrist: Swelling, tenderness, bony tenderness and snuff box tenderness present. No deformity, effusion, lacerations or crepitus. Decreased range of motion. Normal pulse.      Right hand: Swelling and tenderness present. No deformity, lacerations or bony tenderness. Decreased range of motion. Normal strength. Normal sensation. There is no disruption of two-point discrimination. Normal capillary refill. Normal pulse.      Cervical back: Normal range of motion and neck supple.   Skin:     General: Skin is warm and dry.   Neurological:      General: No focal deficit present.      Mental Status: She is alert and oriented to person, place, and time.   Psychiatric:         Mood and Affect: Mood normal.         Behavior: Behavior normal.         Thought Content: Thought content normal.         " Judgment: Judgment normal.                Procedures:  Procedures      Medical Decision Making:    Comorbidities that affect care:    Hypertension, breast cancer, GERD, pulmonary embolism, fibromyalgia, pulmonary fibrosis    External Notes reviewed:    None      The following orders were placed and all results were independently analyzed by me:  Orders Placed This Encounter   Procedures    [facility] 07.  Wrist Brace With ABD Thumb    XR Wrist 3+ View Right    Obtain & Apply The Following- Upper extremity       Medications Given in the Emergency Department:  Medications - No data to display     ED Course:         Labs:    Lab Results (last 24 hours)       ** No results found for the last 24 hours. **             Imaging:    XR Wrist 3+ View Right    Result Date: 12/6/2024  XR WRIST 3+ VW RIGHT Date of Exam: 12/6/2024 4:07 PM EST Indication: fall Comparison: None available. Findings: No evidence of acute fracture. Remote trauma versus unfused ossicle at the ulnar styloid. Mild triscaphe joint arthritis. Soft tissues are unremarkable.     Impression: 1.No evidence of acute fracture. 2.Mild triscaphe joint arthritis. Electronically Signed: Ney Newman MD  12/6/2024 4:14 PM EST  Workstation ID: [id number]       Differential Diagnosis and Discussion:    Joint Pain: Differential diagnosis includes but is not limited to polyarticular arthritis, gout, tendinitis, hemarthrosis, septic arthritis, rheumatoid arthritis, bursitis, degenerative joint disease, joint effusion, autoimmune disorder, trauma, and occult neoplasm.    All X-rays impressions were independently interpreted by me.    MDM  Number of Diagnoses or Management Options  Contusion of face, initial encounter  Fall, initial encounter  Right wrist pain  Diagnosis management comments: Patient presented to the emergency department today for evaluation after a fall.  X-ray of the right wrist was obtained and negative for any acute findings but does note  arthritis.  Patient did have snuffbox tenderness and limited range of motion of the right wrist so I have concern for scaphoid fracture.  Placed was placed in a thumb spica brace and I will have her have a repeat x-ray in 1 week.  Will have patient alternate Tylenol ibuprofen as needed for pain control.  Will also have her complete RICE therapy at home.  Patient did hit her head during the fall and has a small contusion on the forehead.  I did consider CT of her head however patient has no headache, dizziness, and did not have syncopal episode.  Patient is also not on any blood thinners.  Patient given return to the emergency department guidelines.       Amount and/or Complexity of Data Reviewed  Tests in the radiology section of CPT®: reviewed and ordered    Risk of Complications, Morbidity, and/or Mortality  Presenting problems: moderate  Diagnostic procedures: low  Management options: low    Patient Progress  Patient progress: stable       Patient Care Considerations:    CT HEAD: I considered ordering a noncontrast CT of the head, however patient had no syncope, headache, dizziness, and has no neurologic deficits      Consultants/Shared Management Plan:    None    Social Determinants of Health:    Patient is independent, reliable, and has access to care.       Disposition and Care Coordination:    Discharged: The patient is suitable and stable for discharge with no need for consideration of admission.    I have explained the patient´s condition, diagnoses and treatment plan based on the information available to me at this time. I have answered questions and addressed any concerns. The patient has a good  understanding of the patient´s diagnosis, condition, and treatment plan as can be expected at this point. The vital signs have been stable. The patient´s condition is stable and appropriate for discharge from the emergency department.      The patient will pursue further outpatient evaluation with the primary  care physician or other designated or consulting physician as outlined in the discharge instructions. They are agreeable to this plan of care and follow-up instructions have been explained in detail. The patient has received these instructions in written format and has expressed an understanding of the discharge instructions. The patient is aware that any significant change in condition or worsening of symptoms should prompt an immediate return to this or the closest emergency department or call to 911.  I have explained discharge medications and the need for follow up with the patient/caretakers. This was also printed in the discharge instructions. Patient was discharged with the following medications and follow up:      Medication List      No changes were made to your prescriptions during this visit.      Conor Mosquera MD  [address] [address]  [phone]             Final diagnoses:   Fall, initial encounter   Right wrist pain   Contusion of face, initial encounter        ED Disposition       ED Disposition   Discharge    Condition   Stable    Comment   --               This medical record created using voice recognition software.             Vincent Fuller PA-C  12/06/24 [id number]

## 2024-12-10 ENCOUNTER — TELEPHONE (OUTPATIENT)
Dept: ORTHOPEDIC SURGERY | Facility: CLINIC | Age: 79
End: 2024-12-10
Payer: MEDICARE

## 2024-12-10 ENCOUNTER — NURSE TRIAGE (OUTPATIENT)
Dept: CALL CENTER | Facility: HOSPITAL | Age: 79
End: 2024-12-10
Payer: MEDICARE

## 2024-12-10 NOTE — TELEPHONE ENCOUNTER
[facility] call - Requesting to schedule appointment for f/u of facial bruising s/p fall  treated at ED on 12/06/2024. Unable to reach PCP office.    Spoke with caller who denies vision changes, headache. States is needing to schedule f/u appointment with PCP as she was treated in ED on 12/06/2024. She is currently asymptomatic and is out of town bow, wanting to schedule f/u for this Friday.    Caller transferred thru to office to schedule appointment.    Reason for Disposition   Bruising easily is a chronic symptom (recurrent or ongoing AND present > 4 weeks)    Additional Information   Negative: Shock suspected (e.g., cold/pale/clammy skin, too weak to stand, low BP, rapid pulse)   Negative: Fever and purple or blood-colored spots or dots   Negative: Sounds like a life-threatening emergency to the triager   Negative: Bruise(s) of forehead or head   Negative: Bruise(s) of face or jaw   Negative: Patient has a concerning injury (e.g., chest, neck, leg)   Negative: Post-operative bruising   Negative: Dizziness or lightheadedness   Negative: Bruise on head, face, chest, or abdomen and taking Coumadin (warfarin) or other strong blood thinner, or known bleeding disorder (e.g., thrombocytopenia)   Negative: Unexplained bleeding from another site (e.g., gums, nose, urine) as well   Negative: Patient sounds very sick or weak to the triager   Negative: SEVERE pain and not improved 2 hours after pain medicine/ice packs   Negative: Purple or blood-colored spots or dots that are not from injury or friction (no fever and sounds well to triager)   Negative: 5 or more bruises now, NOT caused by an injury   Negative: Raised bruise and size > 2 inches (5 cm) and getting bigger   Negative: Taking Coumadin (warfarin) or other strong blood thinner, or known bleeding disorder (e.g., thrombocytopenia)   Negative: Suspicious history for the injury   Negative: Minor bruising at site of heparin injection (e.g., heparin, Fragmin, Innohep,  "Lovenox)   Negative: Less than 5 unxplained bruises now, NOT caused by an injury   Negative: After 10 days and bruise not fading   Negative: After 3 weeks and bruise still present   Negative: Patient wants to be seen    Answer Assessment - Initial Assessment Questions  1. APPEARANCE of BRUISE: \"Describe the bruise.\"       Bruising now down both eyes with puffiness mostly to left. Black in color  2. SIZE: \"How large is the bruise?\"       Across both eyes  3. NUMBER: \"How many bruises are there?\"         4. LOCATION: \"Where is the bruise located?\"       From forehead down to bilateral eyes  5. ONSET: \"How long ago did the bruise occur?\"       Fall 12/04/2024, treated at ED 12/06/2024  6. CAUSE: \"Tell me how it happened.\"      \"Equilibrium is off\". Not new symptom, has had since 2012  7. MEDICAL HISTORY: \"Do you have any medical problems that can cause easy bruising or bleeding?\" (e.g., leukemia, liver disease, recent chemotherapy)        8. MEDICINES: \"Do you take any medications which thin the blood such as: aspirin, heparin, ibuprofen (NSAIDS), Plavix, or Coumadin?\"      ASA  9. OTHER SYMPTOMS: \"Do you have any other symptoms?\"  (e.g., weakness, dizziness, pain, fever, nosebleed, blood in urine/stool)      Denies  10. PREGNANCY: \"Is there any chance you are pregnant?\" \"When was your last menstrual period?\"        N/A    Protocols used: Bruises-ADULT-[place]    "

## 2024-12-10 NOTE — TELEPHONE ENCOUNTER
Provider: TOMA    Caller: Marci Dudley    Relationship to Patient: Self    Phone Number: [phone]    Reason for Call: PT CALLED STATING SHE WENT TO ED AND HAD XR OF RIGHT WRIST. PT SCHEDULED NEXT AVAILABLE WITH DR CHUA 12/17/24, BUT WANTING TO KNOW IF XR FROM ED CAN BE LOOKED AT. PT ALSO STATED ED TOLD HER SHE WOULD NEED SECOND XR IN A WEEK. PLEASE ADVISE.

## 2024-12-12 ENCOUNTER — OFFICE VISIT (OUTPATIENT)
Dept: INTERNAL MEDICINE | Age: 79
End: 2024-12-12
Payer: MEDICARE

## 2024-12-12 VITALS
BODY MASS INDEX: 28.63 KG/M2 | HEART RATE: 71 BPM | WEIGHT: 182.4 LBS | DIASTOLIC BLOOD PRESSURE: 60 MMHG | TEMPERATURE: 98.9 F | OXYGEN SATURATION: 94 % | SYSTOLIC BLOOD PRESSURE: 102 MMHG | HEIGHT: 67 IN
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
## 2024-12-12 DIAGNOSIS — I10 ESSENTIAL HYPERTENSION: ICD-10-CM
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
## 2024-12-12 NOTE — ASSESSMENT & PLAN NOTE
BP low as of 12/24, but no dizziness, so ok to continue with low dose lisinopril as long as BP is no lower at home.  If so, then hold lisinopril until BP recovers.

## 2024-12-12 NOTE — ASSESSMENT & PLAN NOTE
Patient fell about 10 days ago in [place], slipped trying to get on the toilet and struck her right temple region.  She does have contusion there and the bruising is heading down the right side of her face around the eye.  Otherwise her eyes intact, vision is fine, extraocular muscles were normal.    She was seen in our ER about a week ago, they did images of her wrist, there was no fracture, she has follow-up scheduled with Dr. Watson next week.    Patient is neurologically intact, she is just on low-dose baby aspirin, do not feel that she requires further imaging at this time.

## 2024-12-12 NOTE — PROGRESS NOTES
"Chief Complaint  s/p fall  (Pt feel on 12/3, she was in [place], she came back home and went to ER on 12/6. She states that her right wrist is better. She has an appt with Dr. Watson next week. )    Subjective      Marci Dudley presents to [facility]    Hypertension  Pertinent negatives include no blurred vision, chest pain, palpitations or shortness of breath.   Primary Care Follow-UpPertinent negatives include no chest pain, no confusion, no palpitations, no shortness of breath, no fatigue, no blurred vision, no wheezing, no abdominal pain, no cough and no depressed mood.     HPI:  78 yo female.  Being seen 9/23/2024 as new patient.  Multiple issues as outlined.  Eval per orders.  --->Pt being seen in 12/24 for f/u from fall as noted above.    Review of Systems   Constitutional:  Negative for appetite change, fatigue and fever.   HENT:  Negative for congestion and ear pain.    Eyes:  Negative for blurred vision.   Respiratory:  Negative for cough, chest tightness, shortness of breath and wheezing.    Cardiovascular:  Negative for chest pain, palpitations and leg swelling.   Gastrointestinal:  Negative for abdominal pain.   Genitourinary:  Negative for difficulty urinating, dysuria and hematuria.   Musculoskeletal:  Negative for arthralgias and gait problem.   Skin:  Negative for skin lesions.   Neurological:  Negative for syncope, memory problem and confusion.   Psychiatric/Behavioral:  Negative for self-injury and depressed mood.        Objective   Vital Signs:   /60   Pulse 71   Temp 98.9 °F (37.2 °C) (Skin)   Ht 170.2 cm (67.01\")   Wt 82.7 kg (182 lb 6.4 oz)   SpO2 94%   BMI 28.56 kg/m²         Physical Exam  Vitals and nursing note reviewed.   Constitutional:       General: She is not in acute distress.     Appearance: Normal appearance. She is not toxic-appearing.   HENT:      Head: Atraumatic.      Right Ear: External ear normal.      Left Ear: External ear " normal.      Nose: Nose normal.      Mouth/Throat:      Mouth: Mucous membranes are moist.   Eyes:      General:         Right eye: No discharge.         Left eye: No discharge.      Extraocular Movements: Extraocular movements intact.      Pupils: Pupils are equal, round, and reactive to light.   Neck:      Comments: No carotid bruits.  Cardiovascular:      Rate and Rhythm: Normal rate and regular rhythm.      Pulses: Normal pulses.      Heart sounds: Normal heart sounds. No murmur heard.     No gallop.      Comments: Heart tones normal, no ectopy, no S3.  Pulmonary:      Effort: Pulmonary effort is normal. No respiratory distress.      Breath sounds: No wheezing, rhonchi or rales.      Comments: Lung fields clear bilaterally.  Abdominal:      General: There is no distension.      Palpations: Abdomen is soft. There is no mass.      Tenderness: There is no abdominal tenderness. There is no guarding.      Comments: No liver edge no bruit.   Musculoskeletal:         General: No swelling or tenderness.      Cervical back: No tenderness.      Right lower leg: No edema.      Left lower leg: No edema.      Comments: No peripheral edema.   Skin:     General: Skin is warm and dry.      Findings: No rash.   Neurological:      General: No focal deficit present.      Mental Status: She is alert and oriented to person, place, and time. Mental status is at baseline.      Motor: No weakness.      Gait: Gait normal.   Psychiatric:         Mood and Affect: Mood normal.         Thought Content: Thought content normal.          Result Review   The following data was reviewed by: Conor Mosquera MD on 09/23/2024:  [x] Laboratory  [] Microbiology  [x] Radiology  [] EKG/telemetry  [] Cardiology/Vascular  [] Pathology  [x] Old records             Assessment and Plan   Diagnoses and all orders for this visit:    1. Fall at home, sequela (Primary)  Assessment & Plan:  Patient fell about 10 days ago in [place], slipped trying to get on the  toilet and struck her right temple region.  She does have contusion there and the bruising is heading down the right side of her face around the eye.  Otherwise her eyes intact, vision is fine, extraocular muscles were normal.    She was seen in our ER about a week ago, they did images of her wrist, there was no fracture, she has follow-up scheduled with Dr. Watson next week.    Patient is neurologically intact, she is just on low-dose baby aspirin, do not feel that she requires further imaging at this time.      2. Essential hypertension  Assessment & Plan:  BP low as of 12/24, but no dizziness, so ok to continue with low dose lisinopril as long as BP is no lower at home.  If so, then hold lisinopril until BP recovers.            Total Time Spent: 44 minutes     This time includes time spent by me in the following activities: preparing for the visit, reviewing extensive past medical history and tests, performing a medically appropriate examination and/or evaluation, counseling and educating the patient and/or caregivers, ordering medications, tests, or procedures, referring and/or communicating with other health care professionals and documenting information in the medical record all on this date of service.       Follow Up   Return for Next scheduled follow up.  Patient was given instructions and counseling regarding her condition or for health maintenance advice. Please see specific information pulled into the AVS if appropriate.

## 2024-12-17 ENCOUNTER — OFFICE VISIT (OUTPATIENT)
Dept: ORTHOPEDIC SURGERY | Facility: CLINIC | Age: 79
End: 2024-12-17
Payer: MEDICARE

## 2024-12-17 VITALS
HEIGHT: 67 IN | HEART RATE: 63 BPM | WEIGHT: 180 LBS | OXYGEN SATURATION: 91 % | DIASTOLIC BLOOD PRESSURE: 75 MMHG | SYSTOLIC BLOOD PRESSURE: 132 MMHG | BODY MASS INDEX: 28.25 KG/M2

## 2024-12-17 DIAGNOSIS — S63.501A RIGHT WRIST SPRAIN, INITIAL ENCOUNTER: ICD-10-CM

## 2024-12-17 DIAGNOSIS — M25.531 RIGHT WRIST PAIN: Primary | ICD-10-CM

## 2024-12-17 NOTE — PROGRESS NOTES
"Chief Complaint  Initial Evaluation of the Right Wrist     Subjective      Marci Dudley presents to [facility] for initial evaluation of the right wrist.  She had a fall and tumbled over on the right wrist and hit her head.  She went to the ED on 12/6/24 and had an X ray and placed in a wrist brace.  Her wrist is doing much better.      Allergies   Allergen Reactions    Benzoin Anaphylaxis and Hives    Ciprofloxacin Hives and Shortness Of Breath    Iodine Anaphylaxis    Penicillins Anaphylaxis, Itching and Swelling    Adhesive Tape Rash    Latex Hives and Rash    Levofloxacin Rash        Social History     Socioeconomic History    Marital status:    Tobacco Use    Smoking status: Never     Passive exposure: Never    Smokeless tobacco: Never   Vaping Use    Vaping status: Never Used   Substance and Sexual Activity    Alcohol use: Never    Drug use: Never    Sexual activity: Not Currently     Comment: I am 79yrs        I reviewed the patient's chief complaint, history of present illness, review of systems, past medical history, surgical history, family history, social history, medications, and allergy list.     Review of Systems     Constitutional: Denies fevers, chills, weight loss  Cardiovascular: Denies chest pain, shortness of breath  Skin: Denies rashes, acute skin changes  Neurologic: Denies headache, loss of consciousness        Vital Signs:   /75   Pulse 63   Ht 170.2 cm (67\")   Wt 81.6 kg (180 lb)   SpO2 91%   BMI 28.19 kg/m²          Physical Exam  General: Alert. No acute distress    Ortho Exam        RIGHT WRIST Mild swelling. Mildly Full , thumb opposition, MCP flexors, DIP flexors and PIP flexors.  Sensation grossly intact to light touch, median, radial and ulnar nerve. Positive AIN, PIN and ulnar nerve motor function intact. Axillary nerve intact. Positive pulses.        Procedures      Imaging Results (Most Recent)       None             Result " Review :         XR Wrist 3+ View Right    Result Date: 12/6/2024  Narrative: XR WRIST 3+ VW RIGHT Date of Exam: 12/6/2024 4:07 PM EST Indication: fall Comparison: None available. Findings: No evidence of acute fracture. Remote trauma versus unfused ossicle at the ulnar styloid. Mild triscaphe joint arthritis. Soft tissues are unremarkable.     Impression: Impression: 1.No evidence of acute fracture. 2.Mild triscaphe joint arthritis. Electronically Signed: Ney Newman MD  12/6/2024 4:14 PM EST  Workstation ID: [id number]            Assessment and Plan     Diagnoses and all orders for this visit:    1. Right wrist pain (Primary)    2. Right wrist sprain, initial encounter        Discussed the treatment plan with the patient. I reviewed the X-rays that were obtained 12/6/24 with the patient.     Wear brace as needed.  Come out for ROM exercises.         Call or return if worsening symptoms.    Follow Up     PRN      Patient was given instructions and counseling regarding her condition or for health maintenance advice. Please see specific information pulled into the AVS if appropriate.     Scribed for Nani Watson MD by Airna Leyva MA.  12/17/24   14:42 EST    I have personally performed the services described in this document as scribed by the above individual and it is both accurate and complete. Nani Watson MD 12/19/24

## 2024-12-24 DIAGNOSIS — G62.9 NEUROPATHY: Primary | ICD-10-CM

## 2024-12-26 ENCOUNTER — OFFICE VISIT (OUTPATIENT)
Dept: NEUROLOGY | Facility: CLINIC | Age: 79
End: 2024-12-26
Payer: MEDICARE

## 2024-12-26 ENCOUNTER — PATIENT ROUNDING (BHMG ONLY) (OUTPATIENT)
Dept: NEUROLOGY | Facility: CLINIC | Age: 79
End: 2024-12-26
Payer: MEDICARE

## 2024-12-26 VITALS
BODY MASS INDEX: 25.58 KG/M2 | DIASTOLIC BLOOD PRESSURE: 69 MMHG | HEART RATE: 69 BPM | SYSTOLIC BLOOD PRESSURE: 130 MMHG | WEIGHT: 163 LBS | HEIGHT: 67 IN

## 2024-12-26 DIAGNOSIS — M79.605 BILATERAL LEG PAIN: Primary | ICD-10-CM

## 2024-12-26 DIAGNOSIS — M79.604 BILATERAL LEG PAIN: Primary | ICD-10-CM

## 2024-12-26 DIAGNOSIS — G62.9 NEUROPATHY: ICD-10-CM

## 2024-12-26 PROBLEM — R40.0 UNCONTROLLED DAYTIME SOMNOLENCE: Status: ACTIVE | Noted: 2024-12-26

## 2024-12-26 RX ORDER — GABAPENTIN 300 MG/1
600 CAPSULE ORAL 2 TIMES DAILY
Qty: 120 CAPSULE | Refills: 5 | Status: SHIPPED | OUTPATIENT
Start: 2024-12-26

## 2024-12-26 NOTE — ASSESSMENT & PLAN NOTE
I discussed with him that the bilateral leg pain is secondary to musculoskeletal pain from the way she walks.  She has significant hip pain bilaterally which needs to be addressed by orthopedic surgery.  I discussed with her that her leg pain is not secondary to neuropathy or myopathy.  She is to follow-up with orthopedic surgery to address her bilateral hip pain and musculoskeletal pain in her lower extremities that her gait abnormality from the hip pain.  See again in 6 months time for follow-up.  Thank you for let me participate in her care.

## 2024-12-26 NOTE — ASSESSMENT & PLAN NOTE
She has seen another neurologist in the past and a nerve conduction study showing that the localization is that of the lumbar spine.  Her symptoms and MRI of the lumbar spine does not corroborate.  I discussed with them that the neuropathy is of no clinical significance compared to the pain in her legs and hips.  Neuropathy workup has been unremarkable.  Neuropathy is confined mainly in her feet.

## 2024-12-26 NOTE — ASSESSMENT & PLAN NOTE
Discussed with them that the daytime somnolence needs to be addressed by the sleep physician.  Other etiologies or daytime somnolence should be evaluated such as periodic limb movement disorder, nocturnal cramps.  Discussed with him treatment for nocturnal cramps and parotic limb movement disorder may include medications such as clonazepam however this cannot be given blindly without diagnosis.

## 2024-12-26 NOTE — PROGRESS NOTES
Chief Complaint  Neurologic Problem (Eval for Neuropathy.)    Subjective          Marci Dudley is a 79 y.o. female who presents to [facility]  History of Present Illness  79-year-old woman evaluated for bilateral leg pain all the way up to her hips.  She states that symptoms started in 2013 since her second cancer of the breast and she has been off balance.  She fell in the winter 2013 and not noted there was grease in her feet.  She has been numb in her feet.  Her oncologist thought that she had fibromyalgia syndrome since she hurt everywhere.  In 2019 she was diagnosed to have neuropathy in the feet as well as bulging disc, bone disease, osteoporosis.  She takes a Prolia shot.  She had an MRI of the lumbar spine in May 2021 showing that there is no evidence of significant canal stenosis or nerve compression to account for the patient's symptoms of cauda equina syndrome.  She had a hip x-ray 2-3 views in October 2023 showing moderate arthritis is being followed by orthopedic surgery and is getting injections to her hip.  She states that her hip is significantly painful especially the left side however she wanted to wait until his appointment to see what I thought about her pain.    She has had workup including hemoglobin A1c, vitamin B12, thyroid function testing which has been unremarkable.  She does not drink alcohol.    She has a history of sleep apnea syndrome.  She states that she has the urge to move her legs at night when she sleeps however it has gotten better since she is taking 600 mg of Neurontin.  She has been treated for sleep apnea syndrome by a sleep medicine physician in [place] however she still feels tired especially in the evenings and she takes naps.  Her  states that she may kick at night but they are not really sure.  She states that there is pain and tightness in her legs she gets up in the middle of the night to go to the  "bathroom.      Objective   Vital Signs:   /69 (BP Location: Left arm, Patient Position: Sitting, Cuff Size: Adult)   Pulse 69   Ht 170.2 cm (67.01\")   Wt 73.9 kg (163 lb)   BMI 25.52 kg/m²     Physical Exam   Alert, fluent, phasic, follows commands well.  There is no weakness of the upper or lower extremities individual muscle testing.  Reflexes are absent in the patellar's and ankles.  Sensation is symmetrical to pinprick.  There is no sensory loss.  Vibration is absent in the toes and impaired in the ankles.  Station and gait she walks carefully and takes short steps and an orthopedic gait.  External rotation of the hip produces significant amount of pain.  There is pain and tenderness on the muscles of her care compartment of the leg as well as lateral aspect of her thighs bilaterally.     Assessment and Plan  Diagnoses and all orders for this visit:    1. Bilateral leg pain (Primary)  Assessment & Plan:  I discussed with him that the bilateral leg pain is secondary to musculoskeletal pain from the way she walks.  She has significant hip pain bilaterally which needs to be addressed by orthopedic surgery.  I discussed with her that her leg pain is not secondary to neuropathy or myopathy.  She is to follow-up with orthopedic surgery to address her bilateral hip pain and musculoskeletal pain in her lower extremities that her gait abnormality from the hip pain.  See again in 6 months time for follow-up.  Thank you for let me participate in her care.      2. Neuropathy  Assessment & Plan:  She has seen another neurologist in the past and a nerve conduction study showing that the localization is that of the lumbar spine.  Her symptoms and MRI of the lumbar spine does not corroborate.  I discussed with them that the neuropathy is of no clinical significance compared to the pain in her legs and hips.  Neuropathy workup has been unremarkable.  Neuropathy is confined mainly in her feet.           Total time spent " with the patient and coordinating patient care was 60 minutes.    Follow Up  No follow-ups on file.  Patient was given instructions and counseling regarding her condition or for health maintenance advice. Please see specific information pulled into the AVS if appropriate.

## 2024-12-27 ENCOUNTER — TELEPHONE (OUTPATIENT)
Dept: ORTHOPEDIC SURGERY | Facility: CLINIC | Age: 79
End: 2024-12-27
Payer: MEDICARE

## 2024-12-27 NOTE — TELEPHONE ENCOUNTER
Caller: ELIAN  Relationship to Patient: SELF  Phone Number: [phone] (home)     Reason for Call: PATIENT CALLED ASKING TO SCHEDULE FOLLOW UP AND TELL DR CHUA THAT HER NEURO APPT NOTES ARE IN HER MY CHART INCASE HE WANTS TO REVIEW THEM AND BRING HER IN SOONER THAN 01/14

## 2025-01-14 ENCOUNTER — OFFICE VISIT (OUTPATIENT)
Dept: ORTHOPEDIC SURGERY | Facility: CLINIC | Age: 80
End: 2025-01-14
Payer: MEDICARE

## 2025-01-14 VITALS
SYSTOLIC BLOOD PRESSURE: 127 MMHG | OXYGEN SATURATION: 90 % | DIASTOLIC BLOOD PRESSURE: 75 MMHG | HEART RATE: 72 BPM | HEIGHT: 67 IN | WEIGHT: 182.2 LBS | BODY MASS INDEX: 28.6 KG/M2

## 2025-01-14 DIAGNOSIS — M25.552 BILATERAL HIP PAIN: Primary | ICD-10-CM

## 2025-01-14 DIAGNOSIS — M16.0 OSTEOARTHRITIS OF BOTH HIPS, UNSPECIFIED OSTEOARTHRITIS TYPE: ICD-10-CM

## 2025-01-14 DIAGNOSIS — M25.551 BILATERAL HIP PAIN: Primary | ICD-10-CM

## 2025-01-14 NOTE — PROGRESS NOTES
"Chief Complaint  Follow-up of the Right Hip and Follow-up of the Left Hip     Subjective      Marci Dudley presents to [facility] for follow up of bilateral hips.  She has pain in bilateral hips.  She has had pain for years.  Her left hip is giving out.  She had a left hip scope in 2012.  Her pain is on the side of the hips, in the groin and down the leg.  She does exercises and takes gabapentin morning and night.  She has had injections in the hip in the past.  She could barely walk during the holidays.  She notes she has pain that affects her daily tasks and ADL's.      Allergies   Allergen Reactions    Benzoin Anaphylaxis and Hives    Ciprofloxacin Hives and Shortness Of Breath    Iodine Anaphylaxis    Penicillins Anaphylaxis, Itching and Swelling    Adhesive Tape Rash    Latex Hives and Rash    Levofloxacin Rash        Social History     Socioeconomic History    Marital status:    Tobacco Use    Smoking status: Never     Passive exposure: Never    Smokeless tobacco: Never   Vaping Use    Vaping status: Never Used   Substance and Sexual Activity    Alcohol use: Never    Drug use: Never    Sexual activity: Not Currently     Partners: Male     Birth control/protection: Abstinence     Comment: I am 79yrs        I reviewed the patient's chief complaint, history of present illness, review of systems, past medical history, surgical history, family history, social history, medications, and allergy list.     Review of Systems     Constitutional: Denies fevers, chills, weight loss  Cardiovascular: Denies chest pain, shortness of breath  Skin: Denies rashes, acute skin changes  Neurologic: Denies headache, loss of consciousness        Vital Signs:   /75   Pulse 72   Ht 170.2 cm (67.01\")   Wt 82.6 kg (182 lb 3.2 oz)   SpO2 90%   BMI 28.53 kg/m²          Physical Exam  General: Alert. No acute distress    Ortho Exam        Bilateral hips Mildly full ROM of the hip. Pain " with rotation of the hip.  No skin discoloration, atrophy or swelling. Positive EHL, FHL, GS, and TA. Sensation intact to all 5 nerves of the foot. Pain with straight leg raise. Leg lengths appear equal. Ambulates with Antalgic gait Negative Daina. Negative Bob.  Knee Extensor Mechanism  intact        Procedures      Imaging Results (Most Recent)       Procedure Component Value Units Date/Time    XR Hips Bilateral With or Without Pelvis 3-4 View [[id number]] Resulted: 01/14/25 1055     Updated: 01/14/25 1108             Result Review :     X-Ray Report:  Bilateral hip X-Ray  Indication: Evaluation of bilateral hips  AP/Lateral view(s)  Findings: Left hip moderately advanced degenerative changes with osteophyte formation noted.  Right hip is mild to moderate degenerative changes with osteophyte formation noted.    Prior studies available for comparison: yes             Assessment and Plan     Diagnoses and all orders for this visit:    1. Bilateral hip pain (Primary)  -     XR Hips Bilateral With or Without Pelvis 3-4 View    2. Osteoarthritis of both hips, unspecified osteoarthritis type        Discussed the treatment plan with the patient. I reviewed the X-rays that were obtained today with the patient.     Discussed hip steroid injection vs intra articular hip injection.      Discussed the treatment options with the patient, operative vs non-operative. The patient is a candidate for a left total hip arthroplasty whenever she is ready.      She will call back to schedule.        Discussed surgery., Risks/benefits discussed with patient including, but not limited to: infection, bleeding, neurovascular damage, re-rupture, aesthetic deformity, need for further surgery, and death., Discussed with patient the implant type being used during surgery and patient understands., Surgery pamphlet given., Call or return if worsening symptoms., and DME order for a 3 in 1 given today due to patient will be confined to one  room/level of the home that does not offer a toilet during postop recovery.     Follow Up     2 weeks postoperatively       Patient was given instructions and counseling regarding her condition or for health maintenance advice. Please see specific information pulled into the AVS if appropriate.     Scribed for Nani Watson MD by Arina Leyva MA.  01/14/25   10:53 EST    I have personally performed the services described in this document as scribed by the above individual and it is both accurate and complete. Nani Watson MD 01/14/25

## 2025-01-15 ENCOUNTER — TELEPHONE (OUTPATIENT)
Dept: ORTHOPEDIC SURGERY | Facility: CLINIC | Age: 80
End: 2025-01-15
Payer: MEDICARE

## 2025-01-15 ENCOUNTER — PREP FOR SURGERY (OUTPATIENT)
Dept: OTHER | Facility: HOSPITAL | Age: 80
End: 2025-01-15
Payer: MEDICARE

## 2025-01-15 DIAGNOSIS — M16.0 OSTEOARTHRITIS OF BOTH HIPS, UNSPECIFIED OSTEOARTHRITIS TYPE: Primary | ICD-10-CM

## 2025-01-15 PROBLEM — M16.9 OA (OSTEOARTHRITIS) OF HIP: Status: ACTIVE | Noted: 2025-01-15

## 2025-01-15 RX ORDER — TRANEXAMIC ACID 10 MG/ML
1000 INJECTION, SOLUTION INTRAVENOUS ONCE
OUTPATIENT
Start: 2025-01-15 | End: 2025-01-15

## 2025-01-15 NOTE — TELEPHONE ENCOUNTER
ATTEMPTED TO CALL PATIENT TO SCHEDULE LEFT ANTHONY SX, NO ANSWER, LVM FOR PATIENT TO RETURN MY CALL ON MY DIRECT LINE AT THE OFFICE TO SCHEDULE SX.

## 2025-01-23 ENCOUNTER — OFFICE VISIT (OUTPATIENT)
Dept: INTERNAL MEDICINE | Age: 80
End: 2025-01-23
Payer: MEDICARE

## 2025-01-23 VITALS
HEIGHT: 67 IN | BODY MASS INDEX: 28.56 KG/M2 | HEART RATE: 70 BPM | TEMPERATURE: 98.2 F | SYSTOLIC BLOOD PRESSURE: 142 MMHG | OXYGEN SATURATION: 93 % | WEIGHT: 182 LBS | DIASTOLIC BLOOD PRESSURE: 82 MMHG

## 2025-01-23 DIAGNOSIS — M81.0 AGE-RELATED OSTEOPOROSIS WITHOUT CURRENT PATHOLOGICAL FRACTURE: ICD-10-CM

## 2025-01-23 DIAGNOSIS — G62.9 NEUROPATHY: ICD-10-CM

## 2025-01-23 DIAGNOSIS — R73.01 IMPAIRED FASTING GLUCOSE: ICD-10-CM

## 2025-01-23 DIAGNOSIS — F33.41 RECURRENT MAJOR DEPRESSION IN PARTIAL REMISSION: ICD-10-CM

## 2025-01-23 DIAGNOSIS — I10 ESSENTIAL HYPERTENSION: Primary | ICD-10-CM

## 2025-01-23 DIAGNOSIS — E53.8 VITAMIN B12 DEFICIENCY: ICD-10-CM

## 2025-01-23 DIAGNOSIS — G47.33 OSA (OBSTRUCTIVE SLEEP APNEA): ICD-10-CM
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
## 2025-01-23 PROCEDURE — 1159F MED LIST DOCD IN RCRD: CPT | Performed by: INTERNAL MEDICINE

## 2025-01-23 PROCEDURE — G2211 COMPLEX E/M VISIT ADD ON: HCPCS | Performed by: INTERNAL MEDICINE

## 2025-01-23 PROCEDURE — 3077F SYST BP >= 140 MM HG: CPT | Performed by: INTERNAL MEDICINE

## 2025-01-23 PROCEDURE — 1160F RVW MEDS BY RX/DR IN RCRD: CPT | Performed by: INTERNAL MEDICINE

## 2025-01-23 PROCEDURE — 1125F AMNT PAIN NOTED PAIN PRSNT: CPT | Performed by: INTERNAL MEDICINE

## 2025-01-23 PROCEDURE — 99214 OFFICE O/P EST MOD 30 MIN: CPT | Performed by: INTERNAL MEDICINE

## 2025-01-23 PROCEDURE — 3079F DIAST BP 80-89 MM HG: CPT | Performed by: INTERNAL MEDICINE

## 2025-01-23 RX ORDER — GABAPENTIN 300 MG/1
600 CAPSULE ORAL NIGHTLY
COMMUNITY

## 2025-01-23 NOTE — ASSESSMENT & PLAN NOTE
Blood pressure is controlled as of her 1/25 OV and her pulse is right around 70.  She is just on low-dose lisinopril and stable to continue same.

## 2025-01-23 NOTE — ASSESSMENT & PLAN NOTE
Patient is working towards backing off on gabapentin as of her 1/25 OV per sleep clinic's recommendations given her daytime hypersomnolence.  Additionally they recommend that she try backing off on citalopram in regards to her restless leg/leg cramps.  Patient is aware, she was previously on 10 mg of Celexa, so when things are stable recommend she try backing down on this also.

## 2025-01-23 NOTE — PROGRESS NOTES
"Chief Complaint  Hypertension (Routine follow up, Dexa scan request. Pt states upcoming hip replacement in March 2025. )    Subjective      Marci Dudley presents to [facility]    Hypertension  Pertinent negatives include no blurred vision, chest pain, palpitations or shortness of breath.   Primary Care Follow-UpPertinent negatives include no chest pain, no confusion, no palpitations, no shortness of breath, no fatigue, no blurred vision, no wheezing, no abdominal pain, no cough and no depressed mood.     History of present illness:  Patient is 79-year-old female with underlying hypertension, IFG, sleep apnea, recurrent depression, neuropathy, among others, who was seen in 9/24 as a new patient, and he was coming back in 1/25 for routine follow-up.  We will go over her med list, review recent labs together address her care gaps, and new issues as time permits.      Review of Systems   Constitutional:  Negative for appetite change, fatigue and fever.   HENT:  Negative for congestion and ear pain.    Eyes:  Negative for blurred vision.   Respiratory:  Negative for cough, chest tightness, shortness of breath and wheezing.    Cardiovascular:  Negative for chest pain, palpitations and leg swelling.   Gastrointestinal:  Negative for abdominal pain.   Genitourinary:  Negative for difficulty urinating, dysuria and hematuria.   Musculoskeletal:  Negative for arthralgias and gait problem.   Skin:  Negative for skin lesions.   Neurological:  Negative for syncope, memory problem and confusion.   Psychiatric/Behavioral:  Negative for self-injury and depressed mood.        Objective   Vital Signs:   /82 (BP Location: Left arm, Patient Position: Sitting)   Pulse 70   Temp 98.2 °F (36.8 °C)   Ht 170.2 cm (67.01\")   Wt 82.6 kg (182 lb)   SpO2 93%   BMI 28.50 kg/m²         Physical Exam  Vitals and nursing note reviewed.   Constitutional:       General: She is not in acute " distress.     Appearance: Normal appearance. She is not toxic-appearing.   HENT:      Head: Atraumatic.      Right Ear: External ear normal.      Left Ear: External ear normal.      Nose: Nose normal.      Mouth/Throat:      Mouth: Mucous membranes are moist.   Eyes:      General:         Right eye: No discharge.         Left eye: No discharge.      Extraocular Movements: Extraocular movements intact.      Pupils: Pupils are equal, round, and reactive to light.   Neck:      Comments: No carotid bruits.  Cardiovascular:      Rate and Rhythm: Normal rate and regular rhythm.      Pulses: Normal pulses.      Heart sounds: Normal heart sounds. No murmur heard.     No gallop.      Comments: Heart tones normal, no ectopy, no S3.  Pulmonary:      Effort: Pulmonary effort is normal. No respiratory distress.      Breath sounds: No wheezing, rhonchi or rales.      Comments: Lung fields clear bilaterally.  Abdominal:      General: There is no distension.      Palpations: Abdomen is soft. There is no mass.      Tenderness: There is no abdominal tenderness. There is no guarding.      Comments: No liver edge no bruit.   Musculoskeletal:         General: No swelling or tenderness.      Cervical back: No tenderness.      Right lower leg: No edema.      Left lower leg: No edema.      Comments: No peripheral edema.   Skin:     General: Skin is warm and dry.      Findings: No rash.   Neurological:      General: No focal deficit present.      Mental Status: She is alert and oriented to person, place, and time. Mental status is at baseline.      Motor: No weakness.      Gait: Gait normal.   Psychiatric:         Mood and Affect: Mood normal.         Thought Content: Thought content normal.          Result Review   The following data was reviewed by: Conor Mosquera MD on 09/23/2024:  [x] Laboratory  [] Microbiology  [x] Radiology  [] EKG/telemetry  [] Cardiology/Vascular  [] Pathology  [x] Old records             Assessment and Plan    Diagnoses and all orders for this visit:    1. Essential hypertension (Primary)  Assessment & Plan:  Blood pressure is controlled as of her 1/25 OV and her pulse is right around 70.  She is just on low-dose lisinopril and stable to continue same.    Orders:  -     Magnesium; Future    2. Neuropathy  Overview:  Progress Notes  - documented in this encounter  Kimberli Garcia DO - 07/19/2021 11:05 AM EDT  Formatting of this note is different from the original.  Subjective:    Patient ID: Marci Dudley is a 76 yr/o female who presents for an initial consultation for neuropathy, leg pain and muscle spasms. She initially only had symptoms to the distal legs, but this has progressed now to the upper groin. She thinks that her symptoms began a couple of years ago. She has had a history of breast cancer (2005 and 2012). She did not have chemotherapy, but had extensive radiation. She does have a history of lymphedema to the right arm. She feels that in the evening she has discoloration to her toes. She has had random episodes of burning pain to the feet. She also has muscle spasms to the lateral lower legs that are daily. She has times when she can't move her legs or walk. She has had a right breast reconstruction which affects her balance. She has had a prior right ankle injury. She took Gabapentin 300 mg TID. She does feel that it helps her pain. She tried to wean off of Gabapentin in the past, but had to restart it due to her pain worsening.    EMG/NCS- 4/29/21  IMPRESSION: EMG/NCV study is abnormal suggestive of multilevel lumbar radiculopathy and strongly suspect lumbar stenosis with cauda equina compression.    MRI Lumbar Spine 5/3/21  1. Overall the spinal canal is widely patent and the nerve roots of the cauda equina appear as expected. There is no evidence of significant canal stenosis or nerve compression to account for the patient's symptoms of cauda equina syndrome. There is no  structural abnormality which  would account for bilateral sensory deficits of the lower extremities.  2. Disc degeneration primarily on the left at L3-L4 with an asymmetric bulging disc leading to mild left neuroforaminal stenosis and potential irritation of the left L3 dorsal root ganglion. The canal and neuroforamina are otherwise totally patent  particularly for age.  3. Chronic grade 1 degenerative anterior L5-S1 spondylolisthesis due to facet joint arthritis.  4. Heterogeneous marrow signal likely representing the sequelae of underlying osteopenia or osteoporosis.    HPI  The following portions of the patient's history were reviewed and updated as appropriate: allergies, current medications, past family history, past medical history, past social history, past surgical history and problem list.    Review of Systems  Constitutional: Negative for activity change and fever.  HENT: Positive for hearing loss and trouble swallowing.  Eyes: Negative for pain and visual disturbance.  Respiratory: Negative for cough and shortness of breath.  Cardiovascular: Positive for leg swelling. Negative for chest pain and palpitations.  Gastrointestinal: Negative for constipation, diarrhea and nausea.  Endocrine: Negative for cold intolerance and heat intolerance.  Genitourinary: Negative for dysuria and frequency.  Musculoskeletal: Positive for arthralgias, back pain, gait problem and myalgias. Negative for joint swelling.  Skin: Negative.  Allergic/Immunologic: Negative.  Neurological: Positive for weakness and numbness.  Imbalance  Hematological: Negative.  Psychiatric/Behavioral: Positive for sleep disturbance. Negative for behavioral problems and confusion.    Objective:  Neurologic Exam    Mental Status  Oriented to person, place, and time.  Follows 3 step commands.  Attention: normal. Concentration: normal.  Speech: speech is normal  Level of consciousness: alert  Knowledge: good.  Normal comprehension.    Cranial Nerves    CN II  Visual fields full to  confrontation.    CN III, IV, VI  Pupils are equal, round, and reactive to light.  Extraocular motions are normal.    CN V  Facial sensation intact.    CN VII  Facial expression full, symmetric.    CN VIII  CN VIII normal.    CN IX, X  CN IX normal.    CN XI  CN XI normal.    CN XII  CN XII normal.    Motor Exam  Muscle bulk: normal  Overall muscle tone: normal  Right arm tone: normal  Left arm tone: normal  Right leg tone: normal  Left leg tone: normal    Strength  Strength 5/5 throughout.    Sensory Exam  Right arm light touch: normal  Left arm light touch: normal  Right arm vibration: normal  Left arm vibration: normal  Right leg vibration: decreased from knee  Left leg vibration: decreased from knee  Right arm pinprick: normal  Left arm pinprick: normal  Decreased LT, PP and temperature sensation to the bottom of the shin.    Gait, Coordination, and Reflexes    Reflexes  Right biceps: 1+  Left biceps: 1+  Right triceps: 1+  Left triceps: 1+  Right patellar: 1+  Left patellar: 1+  Right achilles: 1+  Left achilles: 1+  Right ankle clonus: absent  Left ankle clonus: absent  Antalgic gait, + plantar fasciitis to RLE>LLE    Physical Exam  Constitutional: She is oriented to person, place, and time. She appears well-developed and well-nourished.  HENT:  Head: Normocephalic and atraumatic.  Eyes: Pupils are equal, round, and reactive to light. EOM are normal.  Neck: Normal range of motion. Neck supple.  Cardiovascular: Normal rate and regular rhythm.  Pulmonary/Chest: Effort normal and breath sounds normal.  Abdominal: Soft. Bowel sounds are normal.  Musculoskeletal: Normal range of motion.  General: No edema.  Neurological: She is alert and oriented to person, place, and time. She has normal strength.  Reflex Scores:  Tricep reflexes are 1+ on the right side and 1+ on the left side.  Bicep reflexes are 1+ on the right side and 1+ on the left side.  Patellar reflexes are 1+ on the right side and 1+ on the left  "side.  Achilles reflexes are 1+ on the right side and 1+ on the left side.  Skin: Skin is warm and dry. No rash noted.  Psychiatric: She has a normal mood and affect. Her speech is normal and behavior is normal.    Assessment:    77 yo female who presents for an initial consultation for neuropathy, back pain, foot pain and muscle cramping.    Plan:    I will order labs to eval for reversible causes of neuropathy.    I reviewed her EMG/NCS which showed reduced amplitudes and conduction velocity slowing to the motor nerves also which would be more consistent with her symptoms. 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    She and her  feel that her issues are coming from the back, but I reviewed the MRI of the lumbar spine which did not show any significant changes that would cause her symptoms. She continues to get chiropractic care and laser treatments which they think are helpful.    I also feel that she should see podiatry for her symptoms of plantar fasciitis.    She is on Gabapentin 300 mg TID.    I reviewed:  EMG/NCS 4/29/21 by Dr Fanny Amador's notes  Dr Moeller's notes  MRI Lumbar spine 5/3/21     Assessment & Plan:  Patient was on 600 twice daily of gabapentin, she was having daytime somnolence, so she backed it down to 300 twice a day.  Recommend as of her 1/25 OV that she just try taking both 300 mg capsules in the evening.    Was recommended by sleep MD to try to back off on this as well.      3. CARLOS (obstructive sleep apnea)  Overview:  Patient is on auto CPAP 10 to 20 cmH20.  She was adjusted to fixed at 14 cmH2O.    Assessment & Plan:  Patient was seen in the sleep clinic recently as of her 1/25 OV.  They are trying to adjust her CPAP as noted above, she said she noted increased events last night.    We are working on backing off on her gabapentin as noted above, they also had recommendation to back off on Celexa in regards to restless " leg syndrome etc.      4. Impaired fasting glucose  Assessment & Plan:  This was mild, A1c was 5.6 when it was checked in 8/24.  Will repeat this on return to office in the spring.    Orders:  -     Hemoglobin A1c; Future    5. Vitamin B12 deficiency  Assessment & Plan:  B12 level was elevated when checked just last month, she has put it to the side, will repeat it on return to office.--->labs not done 1/25 OV, so will get on RTO in 4 mo instead.      6. Age-related osteoporosis without current pathological fracture  Overview:  BMD 11/22:  Spine -1.0  Left hip -2.2  Right hip -1.3    Assessment & Plan:  Patient has been on Prolia and she is already set up for this in November, Stacy did this at her office visit last month.---> Patient is actually scheduled for left hip replacement in about 2 months by Dr. Watson, so in general her BMD should look better, we will go ahead and put in for routine follow-up as of 1/25 OV.    Orders:  -     DEXA Bone Density Axial    7. Recurrent major depression in partial remission  Overview:  Stacy's note 8/24:  Patient reports that she has been under more stress lately and feels more tearful. She is on Celexa 10 mg daily. There are financial concerns.    Denies any SI.  We discussed increasing the dose. She is agreeable  Sent in Celexa 20 mg daily    Assessment & Plan:  Patient is working towards backing off on gabapentin as of her 1/25 OV per sleep clinic's recommendations given her daytime hypersomnolence.  Additionally they recommend that she try backing off on citalopram in regards to her restless leg/leg cramps.  Patient is aware, she was previously on 10 mg of Celexa, so when things are stable recommend she try backing down on this also.              Total Time Spent:  minutes     This time includes time spent by me in the following activities: preparing for the visit, reviewing extensive past medical history and tests, performing a medically appropriate examination and/or  evaluation, counseling and educating the patient and/or caregivers, ordering medications, tests, or procedures, referring and/or communicating with other health care professionals and documenting information in the medical record all on this date of service.       Follow Up   Return in about 4 months (around 5/23/2025).  Patient was given instructions and counseling regarding her condition or for health maintenance advice. Please see specific information pulled into the AVS if appropriate.

## 2025-01-23 NOTE — ASSESSMENT & PLAN NOTE
Patient has been on Prolia and she is already set up for this in November, Stacy did this at her office visit last month.---> Patient is actually scheduled for left hip replacement in about 2 months by Dr. Watson, so in general her BMD should look better, we will go ahead and put in for routine follow-up as of 1/25 OV.

## 2025-01-23 NOTE — ASSESSMENT & PLAN NOTE
Patient was on 600 twice daily of gabapentin, she was having daytime somnolence, so she backed it down to 300 twice a day.  Recommend as of her 1/25 OV that she just try taking both 300 mg capsules in the evening.    Was recommended by sleep MD to try to back off on this as well.

## 2025-01-23 NOTE — ASSESSMENT & PLAN NOTE
B12 level was elevated when checked just last month, she has put it to the side, will repeat it on return to office.--->labs not done 1/25 OV, so will get on RTO in 4 mo instead.

## 2025-01-23 NOTE — ASSESSMENT & PLAN NOTE
Patient was seen in the sleep clinic recently as of her 1/25 OV.  They are trying to adjust her CPAP as noted above, she said she noted increased events last night.    We are working on backing off on her gabapentin as noted above, they also had recommendation to back off on Celexa in regards to restless leg syndrome etc.

## 2025-01-23 NOTE — ASSESSMENT & PLAN NOTE
This was mild, A1c was 5.6 when it was checked in 8/24.  Will repeat this on return to office in the spring.

## 2025-02-13 ENCOUNTER — HOSPITAL ENCOUNTER (OUTPATIENT)
Dept: BONE DENSITY | Facility: HOSPITAL | Age: 80
Discharge: HOME | End: 2025-02-13
Admitting: INTERNAL MEDICINE
Payer: MEDICARE

## 2025-02-13 PROCEDURE — 77080 DXA BONE DENSITY AXIAL: CPT

## 2025-02-17 ENCOUNTER — TELEPHONE (OUTPATIENT)
Age: 80
End: 2025-02-17
Payer: MEDICARE

## 2025-02-17 RX ORDER — CITALOPRAM HYDROBROMIDE 10 MG/1
10 TABLET ORAL DAILY
Qty: 90 TABLET | Refills: 1 | Status: SHIPPED | OUTPATIENT
Start: 2025-02-17

## 2025-02-18 DIAGNOSIS — Z47.1 AFTERCARE FOLLOWING LEFT HIP JOINT REPLACEMENT SURGERY: Primary | ICD-10-CM

## 2025-02-18 DIAGNOSIS — Z96.642 AFTERCARE FOLLOWING LEFT HIP JOINT REPLACEMENT SURGERY: Primary | ICD-10-CM

## 2025-02-18 DIAGNOSIS — M16.0 OSTEOARTHRITIS OF BOTH HIPS, UNSPECIFIED OSTEOARTHRITIS TYPE: Primary | ICD-10-CM

## 2025-02-19 RX ORDER — LISINOPRIL 10 MG/1
10 TABLET ORAL DAILY
Qty: 90 TABLET | Refills: 1 | Status: SHIPPED | OUTPATIENT
Start: 2025-02-19

## 2025-03-11 ENCOUNTER — PRE-ADMISSION TESTING (OUTPATIENT)
Dept: PREADMISSION TESTING | Facility: HOSPITAL | Age: 80
End: 2025-03-11
Payer: MEDICARE

## 2025-03-11 ENCOUNTER — TELEPHONE (OUTPATIENT)
Dept: ORTHOPEDIC SURGERY | Facility: CLINIC | Age: 80
End: 2025-03-11
Payer: MEDICARE

## 2025-03-11 VITALS
TEMPERATURE: 98.1 F | DIASTOLIC BLOOD PRESSURE: 76 MMHG | SYSTOLIC BLOOD PRESSURE: 130 MMHG | WEIGHT: 181.44 LBS | OXYGEN SATURATION: 92 % | HEIGHT: 67 IN | BODY MASS INDEX: 28.48 KG/M2 | RESPIRATION RATE: 16 BRPM | HEART RATE: 65 BPM

## 2025-03-11 DIAGNOSIS — I50.32 CHRONIC DIASTOLIC HEART FAILURE: ICD-10-CM

## 2025-03-11 DIAGNOSIS — Z01.818 ENCOUNTER FOR PREADMISSION TESTING: Primary | ICD-10-CM

## 2025-03-11 DIAGNOSIS — M16.0 OSTEOARTHRITIS OF BOTH HIPS, UNSPECIFIED OSTEOARTHRITIS TYPE: ICD-10-CM

## 2025-03-11 DIAGNOSIS — Z01.818 PREOPERATIVE CLEARANCE: Primary | ICD-10-CM

## 2025-03-11 LAB
ALBUMIN SERPL-MCNC: 3.7 G/DL (ref 3.5–5.2)
ALBUMIN/GLOB SERPL: 1.2 G/DL
ALP SERPL-CCNC: 61 U/L (ref 39–117)
ALT SERPL W P-5'-P-CCNC: 14 U/L (ref 1–33)
ANION GAP SERPL CALCULATED.3IONS-SCNC: 8.2 MMOL/L (ref 5–15)
AST SERPL-CCNC: 20 U/L (ref 1–32)
BASOPHILS # BLD AUTO: 0.02 10*3/MM3 (ref 0–0.2)
BASOPHILS NFR BLD AUTO: 0.4 % (ref 0–1.5)
BILIRUB SERPL-MCNC: 0.5 MG/DL (ref 0–1.2)
BUN SERPL-MCNC: 12 MG/DL (ref 8–23)
BUN/CREAT SERPL: 17.6 (ref 7–25)
CALCIUM SPEC-SCNC: 9.3 MG/DL (ref 8.6–10.5)
CHLORIDE SERPL-SCNC: 106 MMOL/L (ref 98–107)
CO2 SERPL-SCNC: 24.8 MMOL/L (ref 22–29)
CREAT SERPL-MCNC: 0.68 MG/DL (ref 0.57–1)
DEPRECATED RDW RBC AUTO: 44.5 FL (ref 37–54)
EGFRCR SERPLBLD CKD-EPI 2021: 88.7 ML/MIN/1.73
EOSINOPHIL # BLD AUTO: 0.19 10*3/MM3 (ref 0–0.4)
EOSINOPHIL NFR BLD AUTO: 3.6 % (ref 0.3–6.2)
ERYTHROCYTE [DISTWIDTH] IN BLOOD BY AUTOMATED COUNT: 13.6 % (ref 12.3–15.4)
GLOBULIN UR ELPH-MCNC: 3 GM/DL
GLUCOSE SERPL-MCNC: 96 MG/DL (ref 65–99)
HBA1C MFR BLD: 5.6 % (ref 4.8–5.6)
HCT VFR BLD AUTO: 47.2 % (ref 34–46.6)
HGB BLD-MCNC: 15.4 G/DL (ref 12–15.9)
IMM GRANULOCYTES # BLD AUTO: 0.02 10*3/MM3 (ref 0–0.05)
IMM GRANULOCYTES NFR BLD AUTO: 0.4 % (ref 0–0.5)
LYMPHOCYTES # BLD AUTO: 1.7 10*3/MM3 (ref 0.7–3.1)
LYMPHOCYTES NFR BLD AUTO: 32.1 % (ref 19.6–45.3)
MCH RBC QN AUTO: 29.2 PG (ref 26.6–33)
MCHC RBC AUTO-ENTMCNC: 32.6 G/DL (ref 31.5–35.7)
MCV RBC AUTO: 89.6 FL (ref 79–97)
MONOCYTES # BLD AUTO: 0.4 10*3/MM3 (ref 0.1–0.9)
MONOCYTES NFR BLD AUTO: 7.6 % (ref 5–12)
NEUTROPHILS NFR BLD AUTO: 2.96 10*3/MM3 (ref 1.7–7)
NEUTROPHILS NFR BLD AUTO: 55.9 % (ref 42.7–76)
NRBC BLD AUTO-RTO: 0 /100 WBC (ref 0–0.2)
PLATELET # BLD AUTO: 186 10*3/MM3 (ref 140–450)
PMV BLD AUTO: 9.8 FL (ref 6–12)
POTASSIUM SERPL-SCNC: 4.5 MMOL/L (ref 3.5–5.2)
PROT SERPL-MCNC: 6.7 G/DL (ref 6–8.5)
RBC # BLD AUTO: 5.27 10*6/MM3 (ref 3.77–5.28)
SODIUM SERPL-SCNC: 139 MMOL/L (ref 136–145)
WBC NRBC COR # BLD AUTO: 5.29 10*3/MM3 (ref 3.4–10.8)

## 2025-03-11 PROCEDURE — 93005 ELECTROCARDIOGRAM TRACING: CPT

## 2025-03-11 PROCEDURE — 83036 HEMOGLOBIN GLYCOSYLATED A1C: CPT

## 2025-03-11 PROCEDURE — 36415 COLL VENOUS BLD VENIPUNCTURE: CPT

## 2025-03-11 PROCEDURE — 83880 ASSAY OF NATRIURETIC PEPTIDE: CPT

## 2025-03-11 PROCEDURE — 80053 COMPREHEN METABOLIC PANEL: CPT

## 2025-03-11 PROCEDURE — 85025 COMPLETE CBC W/AUTO DIFF WBC: CPT

## 2025-03-11 NOTE — PAT
Patient aware needs cardiac clearance prior to surgery.  Notified Ingris at Dr. Watson office about need for clearance. Called Dr. Villavicencio office and left voicemail stating why needed referral to cardiology - abn EKG, pt c/o soa and chest pain at time- none currently

## 2025-03-11 NOTE — TELEPHONE ENCOUNTER
ATTEMPTED TO CALL PATIENT RE: SURGERY SCHEDULED FOR 3/17/2025 THAT WILL BE CANCELLED FOR HER SAFETY UNTIL SHE IS ABLE TO OBTAIN A CARDIAC CLEARANCE. NO ANSWER, LVM FOR PATIENT TO RETURN MY CALL ON MY DIRECT LINE OF [phone].     PER PRE-ADMISSION TESTING APPT ON 3/11/2025:   Richa Coates, RN   Registered Nurse     DEV[facility]      Signed     Encounter Date: 3/11/2025     Signed       Summary: cardiac clearance    Patient aware needs cardiac clearance prior to surgery.  Notified Ingris at Dr. Watson office about need for clearance. Called Dr. Villavicencio office and left voicemail stating why needed referral to cardiology - abn EKG, pt c/o soa and chest pain at time- none currently

## 2025-03-11 NOTE — TELEPHONE ENCOUNTER
RECEIVED MESSAGE FROM GUSTAVO ALLEN WITH [facility] STATING THAT PATIENT HAD ABN EKG AND WILL NEED CARDIAC CLEARANCE.   MESSAGE SENT TO GUSTAVO SEALS WITH [facility] TO ASSIST WITH SCHEDULING.   PATIENT AWARE THAT SURGERY IS BEING POSTPONED PENDING CLEARANCE.

## 2025-03-11 NOTE — DISCHARGE INSTRUCTIONS
IMPORTANT INSTRUCTIONS - PRE-ADMISSION TESTING  DO NOT EAT OR CHEW anything after midnight the night before your procedure.    You may have CLEAR liquids up to 3 hours prior to ARRIVAL time.   Take the following medications the morning of your procedure with JUST A SIP OF WATER:  Citalopram, Oxybutynin, Baclofen  Last dose Aspirin 3-10-25    DO NOT BRING your medications to the hospital with you, UNLESS something has changed since your PRE-Admission Testing appointment.  Hold all vitamins, supplements, and NSAIDS (Non- steroidal anti-inflammatory meds) for one week prior to surgery (you MAY take Tylenol or Acetaminophen).  Bring your CPAP or BIPAP to hospital, ONLY IF YOU WILL BE SPENDING THE NIGHT.   Make sure you have a ride home and have someone who will stay with you the day of your procedure after you go home.  If you have any questions, please call your Pre-Admission Testing Nurse, Kim at [phone].   You will receive a call the Friday before surgery with an arrival time.  Main Entrance [facility], Take elevator to first floor, turn left and check in at registration.    Clear Liquid Diet        Find out when you need to start a clear liquid diet.   Think of “clear liquids” as anything you could read a newspaper through. This includes things like water, broth, sports drinks, or tea WITHOUT any kind of milk or cream.           Once you are told to start a clear liquid diet, only drink these things until 3 hours before arrival to the hospital or when the hospital says to stop. Total volume limitation: 8 oz.       Clear liquids you CAN drink:   Water   Clear broth: beef, chicken, vegetable, or bone broth with nothing in it   Gatorade   Lemonade or Martínez-aid   Soda   Tea, coffee (NO cream or honey)   Jell-O (without fruit)   Popsicles (without fruit or cream)   Italian ices   Juice without pulp: apple, white, grape   You may use salt, pepper, and sugar    Do NOT drink:   Milk or cream   Soy  milk, almond milk, coconut milk, or other non-dairy drinks and   creamers   Milkshakes or smoothies   Tomato juice   Orange juice   Grapefruit juice   Cream soups or any other than broth         Clear Liquid Diet:  Do NOT eat any solid food.  Do NOT eat or suck on mints or candy.  Do NOT chew gum.  Do NOT drink thick liquids like milk or juice with pulp in it.  Do NOT add milk, cream, or anything like soy milk or almond milk to coffee or tea.

## 2025-03-12 ENCOUNTER — OFFICE VISIT (OUTPATIENT)
Dept: CARDIOLOGY | Facility: CLINIC | Age: 80
End: 2025-03-12
Payer: MEDICARE

## 2025-03-12 ENCOUNTER — LAB (OUTPATIENT)
Facility: HOSPITAL | Age: 80
End: 2025-03-12
Payer: MEDICARE

## 2025-03-12 VITALS
DIASTOLIC BLOOD PRESSURE: 91 MMHG | HEART RATE: 61 BPM | HEIGHT: 67 IN | WEIGHT: 179 LBS | SYSTOLIC BLOOD PRESSURE: 148 MMHG | BODY MASS INDEX: 28.09 KG/M2

## 2025-03-12 DIAGNOSIS — I50.32 CHRONIC DIASTOLIC HEART FAILURE: ICD-10-CM

## 2025-03-12 LAB
ANION GAP SERPL CALCULATED.3IONS-SCNC: 9.5 MMOL/L (ref 5–15)
BUN SERPL-MCNC: 14 MG/DL (ref 8–23)
BUN/CREAT SERPL: 16.3 (ref 7–25)
CALCIUM SPEC-SCNC: 9.7 MG/DL (ref 8.6–10.5)
CHLORIDE SERPL-SCNC: 105 MMOL/L (ref 98–107)
CO2 SERPL-SCNC: 22.5 MMOL/L (ref 22–29)
CREAT SERPL-MCNC: 0.86 MG/DL (ref 0.57–1)
EGFRCR SERPLBLD CKD-EPI 2021: 68.8 ML/MIN/1.73
GLUCOSE SERPL-MCNC: 88 MG/DL (ref 65–99)
NT-PROBNP SERPL-MCNC: 74.5 PG/ML (ref 0–1800)
POTASSIUM SERPL-SCNC: 4.6 MMOL/L (ref 3.5–5.2)
SODIUM SERPL-SCNC: 137 MMOL/L (ref 136–145)

## 2025-03-12 PROCEDURE — 80048 BASIC METABOLIC PNL TOTAL CA: CPT

## 2025-03-12 PROCEDURE — 36415 COLL VENOUS BLD VENIPUNCTURE: CPT

## 2025-03-12 RX ORDER — SPIRONOLACTONE 25 MG/1
12.5 TABLET ORAL DAILY
Qty: 60 TABLET | Refills: 5 | Status: ON HOLD | OUTPATIENT
Start: 2025-03-12

## 2025-03-12 RX ORDER — LISINOPRIL 10 MG/1
5 TABLET ORAL DAILY
Qty: 90 TABLET | Refills: 1 | Status: ON HOLD | OUTPATIENT
Start: 2025-03-12

## 2025-03-12 NOTE — PROGRESS NOTES
[facility]  Interventional Cardiology Patient Visit Note      Referring Provider:  No referring provider defined for this encounter.    Reason for Referral:     History of Presenting Illness:  Marci Dudley presents to clinic today with a chief complaint of   Planning fro hip replacement surgery  Lattismus dorsi muscle   2016 in the ER for cheest discomfort   No CAD  blockages    No angina, limyited by hips and knees  Dilan on cpap  Exertional dyspnea, with routine activities    Radiation, chemotherapy  RBBB is noted    History of pulmonary fibrosis    Past Medical History  Past Medical History:   Diagnosis Date   • Abnormal Pap smear of cervix 1993   • Anxiety    • Arthritis    • Bilateral lumbar radiculopathy    • Breast cancer     NO STICK/BP ON RIGHT ARM   • Cancer 2005    Breast returned 2012 had Both Breast removed. Radiation 2005 cancer pill 2012- 2019   • Chest pain     on and off   • Cholelithiasis 1971    Removed in 1971   • Chronic cough    • Chronic kidney disease     follows with   • Colon polyp 2000    Tested removed no cancer   • Depression 2012    Treated with citalopram hydrobromide 10mg   • Difficulty walking 2023    Pain from feet , hips  24/7   • Diverticulosis 2009    Flare-up in 2023 went to emergency 00   • Esophageal dysphagia     swallowing dry food   • Falls     R ankle unstable   • Fibromyalgia, primary 2013    My cancer doctor Dr Srivastava put me on Gabapentin 300mg   • GERD (gastroesophageal reflux disease)     controled with diet   • H/O Kidney infection     HISTORY OF MULTIPLE   • Hiatal hernia    • History of gangrene     AS A CHILD   • History of kidney stones    • History of transfusion     AS A CHILD   • HL (hearing loss) 2020    Wear hearing aids from [employer]   • Hypertension    • Incontinence    • Kidney stone 2009    No problem till 2023   • Low back pain    • Lymphedema     RIGHT ARM   • Movement disorder 2023    Gets worse legs do not work  well   • Neuropathy    • Nonsenile cataract    • OAB (overactive bladder)    • Osteopenia    • Pulmonary fibrosis     follows with Dr. Powell   • Renal insufficiency     Surgery on blatter R kidney was full of scare tissues   • Right ankle instability     right ankle and leg instabilty   • Sleep apnea     CPAP   • SOB (shortness of breath)    • Urinary incontinence     Need #6 pads 2 or 3 times daily.   • Visual impairment     Wear glasses have skin from eye lid that hangs over pupils         Current Outpatient Medications:   •  baclofen (LIORESAL) 20 MG tablet, Take 1 tablet by mouth 2 (Two) Times a Day., Disp: , Rfl:   •  citalopram (CeleXA) 10 MG tablet, Take 1 tablet by mouth Daily., Disp: 90 tablet, Rfl: 1  •  gabapentin (NEURONTIN) 300 MG capsule, Take 2 capsules by mouth Every Night., Disp: , Rfl:   •  lisinopril (PRINIVIL,ZESTRIL) 10 MG tablet, Take 1 tablet by mouth Daily., Disp: 90 tablet, Rfl: 1  •  oxybutynin (DITROPAN) 5 MG tablet, Take 1 tablet by mouth 2 (Two) Times a Day., Disp: , Rfl:   •  aspirin 81 MG EC tablet, Take 1 tablet by mouth Every Night. (Patient not taking: Reported on 3/12/2025), Disp: , Rfl:   Current outpatient and discharge medications have been reconciled for the patient.  Reviewed by: Giovanni Moore MD     There are no discontinued medications.    Allergies   Allergen Reactions   • Benzoin Anaphylaxis and Hives   • Ciprofloxacin Hives and Shortness Of Breath   • Iodine Anaphylaxis   • Penicillins Anaphylaxis, Itching and Swelling   • Adhesive Tape Rash   • Latex Hives and Rash   • Levofloxacin Rash        Social History     Tobacco Use   • Smoking status: Never     Passive exposure: Never   • Smokeless tobacco: Never   Vaping Use   • Vaping status: Never Used   Substance Use Topics   • Alcohol use: Never   • Drug use: Never       Family History   Problem Relation Age of Onset   • Cancer Mother         Colon cancer  twice   • Cancer Father         Lung    "57yrs old   • Arthritis Maternal Aunt         96 yrs old all over body hands bad.   • Arthritis Sister         Same as me 2 kind   • Cancer Sister         Breast cancer and Skin cancer bad has a blood type has white cells added to body once a month dialysis   • Cancer Sister         Lung  at 37yrs old   • Neuropathy Sister    • Malig Hyperthermia Neg Hx           Objective   /91 (BP Location: Right arm, Patient Position: Sitting, Cuff Size: Adult)   Pulse 61   Ht 170.2 cm (67\")   Wt 81.2 kg (179 lb)   BMI 28.04 kg/m²     Wt Readings from Last 3 Encounters:   25 81.2 kg (179 lb)   25 82.3 kg (181 lb 7 oz)   25 82.6 kg (182 lb)     BP Readings from Last 3 Encounters:   25 148/91   25 130/76   25 142/82       Physical Exam  Constitutional:  Awake. Not in acute distress. Normal appearance.   Neck: No carotid bruit, hepatojugular reflux or JVD.   Cardiovascular:      Rate and Rhythm: Normal rate and regular rhythm.      Chest Wall: PMI is not displaced.      Heart sounds: Normal heart sounds, S1 normal and S2 normal. No murmur heard.       No friction rub. No gallop. No S3 or S4 sounds.    Pulmonary: Pulmonary effort is normal. Normal breath sounds. No wheezing, rhonchi or rales.   Extremities: No Bilateral edema is noted.   Skin: Warm and dry. Non cyanotic, No petechiae or rash.   Neurological: Alert and oriented x 3  Psychiatric:  Behavior is cooperative.       Result Review :{Labs  Result Review  Imaging  Med Tab  Media :23}   The following data was reviewed by Giovanni Moore MD on 2025   No results found for: \"PROBNP\"  CMP          2024    14:19 2024    15:49 3/11/2025    11:43   CMP   Glucose 87  126  96    BUN 14  15  12    Creatinine 0.84  0.97  0.68    EGFR 70.8  59.6  88.7    Sodium 138  141  139    Potassium 4.5  4.3  4.5    Chloride 105  105  106    Calcium 9.3  10.0  9.3    Total Protein 6.9  6.9  6.7    Albumin 3.9  3.9  3.7    Globulin " "3.0  3.0  3.0    Total Bilirubin 0.6  0.5  0.5    Alkaline Phosphatase 60  74  61    AST (SGOT) 30  23  20    ALT (SGPT) 15  15  14    Albumin/Globulin Ratio 1.3  1.3  1.2    BUN/Creatinine Ratio 16.7  15.5  17.6    Anion Gap 11.0  10.8  8.2      CBC w/diff          4/3/2024    05:05 8/30/2024    14:19 3/11/2025    11:42   CBC w/Diff   WBC 14.21  5.79  5.29    RBC 4.56  5.47  5.27    Hemoglobin 13.3  16.0  15.4    Hematocrit 41.3  49.6  47.2    MCV 90.6  90.7  89.6    MCH 29.2  29.3  29.2    MCHC 32.2  32.3  32.6    RDW 13.6  12.7  13.6    Platelets 176  232  186    Neutrophil Rel % 88.2  50.2  55.9    Immature Granulocyte Rel % 0.6  0.2  0.4    Lymphocyte Rel % 8.2  38.2  32.1    Monocyte Rel % 2.9  5.9  7.6    Eosinophil Rel % 0.0  5.0  3.6    Basophil Rel % 0.1  0.5  0.4       Lab Results   Component Value Date    TSH 1.080 08/30/2024      Lab Results   Component Value Date    FREET4 1.19 08/30/2024      No results found for: \"DDIMERQUANT\"  Magnesium   Date Value Ref Range Status   11/14/2024 2.0 1.6 - 2.4 mg/dL Final      No results found for: \"DIGOXIN\"   No results found for: \"TROPONINT\"   No results found for: \"POCTROP\"      Lab 03/11/25  1142   HEMOGLOBIN A1C 5.60      A1C Last 3 Results          8/30/2024    14:19 3/11/2025    11:42   HGBA1C Last 3 Results   Hemoglobin A1C 5.60  5.60      Lipid Panel          8/30/2024    14:19   Lipid Panel   Total Cholesterol 156    Triglycerides 86    HDL Cholesterol 46    VLDL Cholesterol 16    LDL Cholesterol  94    LDL/HDL Ratio 2.02            No results found for this or any previous visit.          The 10-year ASCVD risk score (Mallorie HODGES, et al., 2019) is: 38.8%    Values used to calculate the score:      Age: 79 years      Sex: Female      Is Non- : No      Diabetic: No      Tobacco smoker: No      Systolic Blood Pressure: 148 mmHg      Is BP treated: Yes      HDL Cholesterol: 46 mg/dL      Total Cholesterol: 156 mg/dL        Assessment  No " diagnosis found.    Plan    There are no diagnoses linked to this encounter.           Follow Up {Instructions Charge Capture  Follow-up Communications :23}    No follow-ups on file.      Giovanni Moore MD  Interventional Cardiology  03/12/2025  13:18 EDT      Patient was given instructions and counseling regarding her condition or for health maintenance advice. Please see specific information pulled into the AVS if appropriate.     Please note that portions of this document were completed using a voice recognition program.

## 2025-03-13 ENCOUNTER — TELEPHONE (OUTPATIENT)
Dept: CARDIOLOGY | Facility: CLINIC | Age: 80
End: 2025-03-13

## 2025-03-13 ENCOUNTER — RESULTS FOLLOW-UP (OUTPATIENT)
Facility: HOSPITAL | Age: 80
End: 2025-03-13
Payer: MEDICARE

## 2025-03-13 ENCOUNTER — HOSPITAL ENCOUNTER (OUTPATIENT)
Facility: HOSPITAL | Age: 80
Discharge: HOME | End: 2025-03-13
Admitting: STUDENT IN AN ORGANIZED HEALTH CARE EDUCATION/TRAINING PROGRAM
Payer: MEDICARE

## 2025-03-13 DIAGNOSIS — I50.32 CHRONIC DIASTOLIC HEART FAILURE: ICD-10-CM

## 2025-03-13 LAB
AORTIC DIMENSIONLESS INDEX: 0.86 (DI)
ASCENDING AORTA: 3.5 CM
AV MEAN PRESS GRAD SYS DOP V1V2: 9.7 MMHG
AV VMAX SYS DOP: 196.8 CM/SEC
BH CV ECHO MEAS - AO MAX PG: 15.5 MMHG
BH CV ECHO MEAS - AO ROOT DIAM: 2.9 CM
BH CV ECHO MEAS - AO V2 VTI: 42.4 CM
BH CV ECHO MEAS - AVA(I,D): 3 CM2
BH CV ECHO MEAS - EDV(CUBED): 123.1 ML
BH CV ECHO MEAS - EDV(MOD-SP2): 48.3 ML
BH CV ECHO MEAS - EDV(MOD-SP4): 53 ML
BH CV ECHO MEAS - EF(MOD-SP2): 58.6 %
BH CV ECHO MEAS - EF(MOD-SP4): 58.9 %
BH CV ECHO MEAS - ESV(CUBED): 44.8 ML
BH CV ECHO MEAS - ESV(MOD-SP2): 20 ML
BH CV ECHO MEAS - ESV(MOD-SP4): 21.8 ML
BH CV ECHO MEAS - FS: 28.6 %
BH CV ECHO MEAS - IVS/LVPW: 0.83 CM
BH CV ECHO MEAS - IVSD: 0.89 CM
BH CV ECHO MEAS - LA DIMENSION: 3.3 CM
BH CV ECHO MEAS - LAT PEAK E' VEL: 10.1 CM/SEC
BH CV ECHO MEAS - LV DIASTOLIC VOL/BSA (35-75): 27.6 CM2
BH CV ECHO MEAS - LV MASS(C)D: 176.2 GRAMS
BH CV ECHO MEAS - LV MAX PG: 12.8 MMHG
BH CV ECHO MEAS - LV MEAN PG: 7 MMHG
BH CV ECHO MEAS - LV SYSTOLIC VOL/BSA (12-30): 11.4 CM2
BH CV ECHO MEAS - LV V1 MAX: 178.6 CM/SEC
BH CV ECHO MEAS - LV V1 VTI: 36.4 CM
BH CV ECHO MEAS - LVIDD: 5 CM
BH CV ECHO MEAS - LVIDS: 3.6 CM
BH CV ECHO MEAS - LVOT AREA: 3.5 CM2
BH CV ECHO MEAS - LVOT DIAM: 2.11 CM
BH CV ECHO MEAS - LVPWD: 1.07 CM
BH CV ECHO MEAS - MED PEAK E' VEL: 5.4 CM/SEC
BH CV ECHO MEAS - MV A MAX VEL: 98.1 CM/SEC
BH CV ECHO MEAS - MV DEC SLOPE: 250.5 CM/SEC2
BH CV ECHO MEAS - MV DEC TIME: 0.24 SEC
BH CV ECHO MEAS - MV E MAX VEL: 59.5 CM/SEC
BH CV ECHO MEAS - MV E/A: 0.61
BH CV ECHO MEAS - RVDD: 2.34 CM
BH CV ECHO MEAS - SV(LVOT): 127.3 ML
BH CV ECHO MEAS - SV(MOD-SP2): 28.3 ML
BH CV ECHO MEAS - SV(MOD-SP4): 31.2 ML
BH CV ECHO MEAS - SVI(LVOT): 66.3 ML/M2
BH CV ECHO MEAS - SVI(MOD-SP2): 14.7 ML/M2
BH CV ECHO MEAS - SVI(MOD-SP4): 16.3 ML/M2
BH CV ECHO MEASUREMENTS AVERAGE E/E' RATIO: 7.68
IVRT: 77 MS
LEFT ATRIUM VOLUME INDEX: 18 ML/M2
LV EF BIPLANE MOD: 59.6 %

## 2025-03-13 PROCEDURE — 93306 TTE W/DOPPLER COMPLETE: CPT

## 2025-03-13 NOTE — PROGRESS NOTES
[facility]  Interventional Cardiology Patient Visit Note      Referring Provider:  No referring provider defined for this encounter.    Reason for Referral:   Preop risk stratification for hip surgery  History of Presenting Illness:  History of Present Illness  The patient is a 79-year-old female with a past medical history of ILD, breast cancer status post reconstruction with latissimus dorsi flap, fibromyalgia, sleep apnea on CPAP therapy, and hypertension. She is planning to undergo hip surgery and is presenting to [facility] care and for risk stratification.    She stated that in 2016, she developed chest pain.  She underwent coronary angiogram at [facility] which was unremarkable for significant coronary artery disease.  Recently, she has been experiencing exertional dyspnea with routine activities. Her primary care provider initiated a workup, and one of the EKG findings was right bundle branch block, prompting a cardiology visit for further evaluation.     She does not experience angina and is primarily limited in her activities due to hip and knee arthritis. However, she is able to perform her routine activities, climb a flight of stairs, and engage in light housework without becoming short of breath if she proceeds slowly. She frequently navigates the stairs to her basement. She does not endorse orthopnea, PND, presyncope, or syncope. She does not smoke and reports no alcohol consumption. Her blood pressure is optimally controlled at home.    SOCIAL HISTORY  She does not smoke and does not drink alcohol.    MEDICATIONS  spironolactone, lisinopril    Past Medical History  Past Medical History:   Diagnosis Date    Abnormal Pap smear of cervix 1993    Anxiety     Arthritis     Bilateral lumbar radiculopathy     Breast cancer     NO STICK/BP ON RIGHT ARM    Cancer 2005    Breast returned 2012 had Both Breast removed. Radiation 2005 cancer pill 2012- 2019    Chest pain      on and off    Cholelithiasis 1971    Removed in 1971    Chronic cough     Chronic kidney disease     follows with    Colon polyp 2000    Tested removed no cancer    Depression 2012    Treated with citalopram hydrobromide 10mg    Difficulty walking 2023    Pain from feet , hips  24/7    Diverticulosis 2009    Flare-up in 2023 went to emergency 00    Esophageal dysphagia     swallowing dry food    Falls     R ankle unstable    Fibromyalgia, primary 2013    My cancer doctor Dr Srivastava put me on Gabapentin 300mg    GERD (gastroesophageal reflux disease)     controled with diet    H/O Kidney infection     HISTORY OF MULTIPLE    Hiatal hernia     History of gangrene     AS A CHILD    History of kidney stones     History of transfusion     AS A CHILD    HL (hearing loss) 2020    Wear hearing aids from [employer]    Hypertension     Incontinence     Kidney stone 2009    No problem till 2023    Low back pain     Lymphedema     RIGHT ARM    Movement disorder 2023    Gets worse legs do not work well    Neuropathy     Nonsenile cataract     OAB (overactive bladder)     Osteopenia     Pulmonary fibrosis     follows with Dr. Powell    Renal insufficiency 1971    Surgery on blatter R kidney was full of scare tissues    Right ankle instability     right ankle and leg instabilty    Sleep apnea     CPAP    SOB (shortness of breath)     Urinary incontinence 2001    Need #6 pads 2 or 3 times daily.    Visual impairment 1990    Wear glasses have skin from eye lid that hangs over pupils         Current Outpatient Medications:     baclofen (LIORESAL) 20 MG tablet, Take 1 tablet by mouth 2 (Two) Times a Day., Disp: , Rfl:     citalopram (CeleXA) 10 MG tablet, Take 1 tablet by mouth Daily., Disp: 90 tablet, Rfl: 1    gabapentin (NEURONTIN) 300 MG capsule, Take 2 capsules by mouth Every Night., Disp: , Rfl:     lisinopril (PRINIVIL,ZESTRIL) 10 MG tablet, Take 0.5 tablets by mouth Daily., Disp: 90 tablet, Rfl: 1    oxybutynin  "(DITROPAN) 5 MG tablet, Take 1 tablet by mouth 2 (Two) Times a Day., Disp: , Rfl:     empagliflozin (Jardiance) 10 MG tablet tablet, Take 1 tablet by mouth Daily., Disp: 60 tablet, Rfl: 3    empagliflozin (Jardiance) 10 MG tablet tablet, Take 1 tablet by mouth Daily., Disp: 28 tablet, Rfl: 0    spironolactone (ALDACTONE) 25 MG tablet, Take 0.5 tablets by mouth Daily., Disp: 60 tablet, Rfl: 5  Current outpatient and discharge medications have been reconciled for the patient.  Reviewed by: Giovanni Moore MD     Medications Discontinued During This Encounter   Medication Reason    aspirin 81 MG EC tablet *Therapy completed    lisinopril (PRINIVIL,ZESTRIL) 10 MG tablet      Allergies   Allergen Reactions    Benzoin Anaphylaxis and Hives    Ciprofloxacin Hives and Shortness Of Breath    Iodine Anaphylaxis    Penicillins Anaphylaxis, Itching and Swelling    Adhesive Tape Rash    Latex Hives and Rash    Levofloxacin Rash      Social History     Tobacco Use    Smoking status: Never     Passive exposure: Never    Smokeless tobacco: Never   Vaping Use    Vaping status: Never Used   Substance Use Topics    Alcohol use: Never    Drug use: Never     Family History   Problem Relation Age of Onset    Cancer Mother         Colon cancer  twice    Cancer Father         Lung  1970 57yrs old    Arthritis Maternal Aunt         96 yrs old all over body hands bad.    Arthritis Sister         Same as me 2 kind    Cancer Sister         Breast cancer and Skin cancer bad has a blood type has white cells added to body once a month dialysis    Cancer Sister         Lung  at 37yrs old    Neuropathy Sister     Malig Hyperthermia Neg Hx           Objective   /91 (BP Location: Right arm, Patient Position: Sitting, Cuff Size: Adult)   Pulse 61   Ht 170.2 cm (67\")   Wt 81.2 kg (179 lb)   BMI 28.04 kg/m²     Wt Readings from Last 3 Encounters:   25 81.2 kg (179 lb)   25 82.3 kg (181 lb 7 oz)   25 82.6 kg (182 lb) "     BP Readings from Last 3 Encounters:   03/12/25 148/91   03/11/25 130/76   01/23/25 142/82       Physical Exam  Constitutional:  Awake. Not in acute distress. Normal appearance.   Neck: No carotid bruit, hepatojugular reflux or JVD.   Cardiovascular:      Rate and Rhythm: Normal rate and regular rhythm.      Chest Wall: PMI is not displaced.      Heart sounds: Normal heart sounds, S1 normal and S2 normal. No murmur heard.       No friction rub. No gallop. No S3 or S4 sounds.    Pulmonary: Pulmonary effort is normal. Normal breath sounds. No wheezing, rhonchi or rales.   Extremities: No Bilateral edema is noted.   Skin: Warm and dry. Non cyanotic, No petechiae or rash.   Neurological: Alert and oriented x 3  Psychiatric:  Behavior is cooperative.       Result Review :   The following data was reviewed by Giovanni Moore MD on 03/12/2025   proBNP   Date Value Ref Range Status   03/11/2025 74.5 0.0 - 1,800.0 pg/mL Final     CMP          11/14/2024    15:49 3/11/2025    11:43 3/12/2025    14:40   CMP   Glucose 126  96  88    BUN 15  12  14    Creatinine 0.97  0.68  0.86    EGFR 59.6  88.7  68.8    Sodium 141  139  137    Potassium 4.3  4.5  4.6    Chloride 105  106  105    Calcium 10.0  9.3  9.7    Total Protein 6.9  6.7     Albumin 3.9  3.7     Globulin 3.0  3.0     Total Bilirubin 0.5  0.5     Alkaline Phosphatase 74  61     AST (SGOT) 23  20     ALT (SGPT) 15  14     Albumin/Globulin Ratio 1.3  1.2     BUN/Creatinine Ratio 15.5  17.6  16.3    Anion Gap 10.8  8.2  9.5      CBC w/diff          4/3/2024    05:05 8/30/2024    14:19 3/11/2025    11:42   CBC w/Diff   WBC 14.21  5.79  5.29    RBC 4.56  5.47  5.27    Hemoglobin 13.3  16.0  15.4    Hematocrit 41.3  49.6  47.2    MCV 90.6  90.7  89.6    MCH 29.2  29.3  29.2    MCHC 32.2  32.3  32.6    RDW 13.6  12.7  13.6    Platelets 176  232  186    Neutrophil Rel % 88.2  50.2  55.9    Immature Granulocyte Rel % 0.6  0.2  0.4    Lymphocyte Rel % 8.2  38.2  32.1    Monocyte  "Rel % 2.9  5.9  7.6    Eosinophil Rel % 0.0  5.0  3.6    Basophil Rel % 0.1  0.5  0.4       Lab Results   Component Value Date    TSH 1.080 08/30/2024      Lab Results   Component Value Date    FREET4 1.19 08/30/2024      No results found for: \"DDIMERQUANT\"  Magnesium   Date Value Ref Range Status   11/14/2024 2.0 1.6 - 2.4 mg/dL Final      No results found for: \"DIGOXIN\"   No results found for: \"TROPONINT\"   No results found for: \"POCTROP\"      Lab 03/11/25  1142   HEMOGLOBIN A1C 5.60      A1C Last 3 Results          8/30/2024    14:19 3/11/2025    11:42   HGBA1C Last 3 Results   Hemoglobin A1C 5.60  5.60      Lipid Panel          8/30/2024    14:19   Lipid Panel   Total Cholesterol 156    Triglycerides 86    HDL Cholesterol 46    VLDL Cholesterol 16    LDL Cholesterol  94    LDL/HDL Ratio 2.02            No results found for this or any previous visit.        The 10-year ASCVD risk score (Mallorie HODGES, et al., 2019) is: 38.8%    Values used to calculate the score:      Age: 79 years      Sex: Female      Is Non- : No      Diabetic: No      Tobacco smoker: No      Systolic Blood Pressure: 148 mmHg      Is BP treated: Yes      HDL Cholesterol: 46 mg/dL      Total Cholesterol: 156 mg/dL        Assessment  Assessment & Plan  1. Preoperative risk stratification for hip replacement surgery.  Based on the Duke Activity Status Index (DASI), her physical activity level is equivalent to or greater than 4 METS. No murmur was auscultated during the physical examination. Given her activity level exceeding 4 METS, no further workup is indicated. An echocardiogram will be performed to assess overall left ventricular function, including diastology, considering her prior history of chemotherapy and radiotherapy, and to evaluate the etiology of her dyspnea.    2. Suspected Chronic diastolic heart failure   She presents with risk factors for diastolic heart failure and is experiencing dyspnea. A trial of " Jardiance therapy will be initiated to alleviate her symptoms.   Additionally, her current regimen of spironolactone 25 mg and lisinopril 5 mg will be continued to manage her dyspneic episodes.    3. Interstitial lung disease.  She has a history of interstitial lung disease and is under the care of a pulmonology team. Currently, she does not require oxygen therapy for her ILD.    4. Hypertension.  Her blood pressure is currently optimally controlled. The target blood pressure goal is set at 130/80.    Plan                Diagnoses and all orders for this visit:    1. Chronic diastolic heart failure  -     BNP; Future  -     Cancel: Adult Transthoracic Echo Complete W/ Cont if Necessary Per Protocol; Future  -     spironolactone (ALDACTONE) 25 MG tablet; Take 0.5 tablets by mouth Daily.  Dispense: 60 tablet; Refill: 5  -     empagliflozin (Jardiance) 10 MG tablet tablet; Take 1 tablet by mouth Daily.  Dispense: 60 tablet; Refill: 3  -     lisinopril (PRINIVIL,ZESTRIL) 10 MG tablet; Take 0.5 tablets by mouth Daily.  Dispense: 90 tablet; Refill: 1  -     Basic Metabolic Panel; Future  -     Adult Transthoracic Echo Complete W/ Cont if Necessary Per Protocol; Future    Other orders  -     empagliflozin (Jardiance) 10 MG tablet tablet; Take 1 tablet by mouth Daily.  Dispense: 28 tablet; Refill: 0      Follow Up     No follow-ups on file.      Giovanni Moore MD  Interventional Cardiology  03/12/2025  09:36 EDT      Patient was given instructions and counseling regarding her condition or for health maintenance advice. Please see specific information pulled into the AVS if appropriate.     Please note that portions of this document were completed using a voice recognition program.     Patient or patient representative verbalized consent for the use of Ambient Listening during the visit with  Giovanni Moore MD for chart documentation. 3/13/2025  10:18 EDT

## 2025-03-14 ENCOUNTER — PATIENT ROUNDING (BHMG ONLY) (OUTPATIENT)
Dept: CARDIOLOGY | Facility: CLINIC | Age: 80
End: 2025-03-14
Payer: MEDICARE

## 2025-03-15 LAB
QT INTERVAL: 473 MS
QTC INTERVAL: 461 MS

## 2025-03-15 NOTE — H&P
Chief Complaint  No chief complaint on file.     Subjective      Marci Dudley presents to [facility] for follow up of bilateral hips.  She has pain in bilateral hips.  She has had pain for years.  Her left hip is giving out.  She had a left hip scope in 2012.  Her pain is on the side of the hips, in the groin and down the leg.  She does exercises and takes gabapentin morning and night.  She has had injections in the hip in the past.  She could barely walk during the holidays.  She notes she has pain that affects her daily tasks and ADL's.      Allergies   Allergen Reactions    Benzoin Anaphylaxis and Hives    Ciprofloxacin Hives and Shortness Of Breath    Iodine Anaphylaxis    Penicillins Anaphylaxis, Itching and Swelling    Adhesive Tape Rash    Latex Hives and Rash    Levofloxacin Rash        Social History     Socioeconomic History    Marital status:    Tobacco Use    Smoking status: Never     Passive exposure: Never    Smokeless tobacco: Never   Vaping Use    Vaping status: Never Used   Substance and Sexual Activity    Alcohol use: Never    Drug use: Never    Sexual activity: Defer        I reviewed the patient's chief complaint, history of present illness, review of systems, past medical history, surgical history, family history, social history, medications, and allergy list.     Review of Systems     Constitutional: Denies fevers, chills, weight loss  Cardiovascular: Denies chest pain, shortness of breath  Skin: Denies rashes, acute skin changes  Neurologic: Denies headache, loss of consciousness        Vital Signs:   There were no vitals taken for this visit.         Physical Exam  General: Alert. No acute distress    Ortho Exam        Bilateral hips Mildly full ROM of the hip. Pain with rotation of the hip.  No skin discoloration, atrophy or swelling. Positive EHL, FHL, GS, and TA. Sensation intact to all 5 nerves of the foot. Pain with straight leg raise. Leg lengths appear  equal. Ambulates with Antalgic gait Negative Daina. Negative Bob.  Knee Extensor Mechanism  intact        Procedures      Imaging Results (Most Recent)       None             Result Review :     X-Ray Report:  Bilateral hip X-Ray  Indication: Evaluation of bilateral hips  AP/Lateral view(s)  Findings: Left hip moderately advanced degenerative changes with osteophyte formation noted.  Right hip is mild to moderate degenerative changes with osteophyte formation noted.    Prior studies available for comparison: yes             Assessment and Plan     There are no diagnoses linked to this encounter.      Discussed the treatment plan with the patient. I reviewed the X-rays that were obtained today with the patient.     Discussed hip steroid injection vs intra articular hip injection.      Discussed the treatment options with the patient, operative vs non-operative. The patient is a candidate for a left total hip arthroplasty whenever she is ready.      She will call back to schedule.        Discussed surgery., Risks/benefits discussed with patient including, but not limited to: infection, bleeding, neurovascular damage, re-rupture, aesthetic deformity, need for further surgery, and death., Discussed with patient the implant type being used during surgery and patient understands., Surgery pamphlet given., Call or return if worsening symptoms., and DME order for a 3 in 1 given today due to patient will be confined to one room/level of the home that does not offer a toilet during postop recovery.     Follow Up     2 weeks postoperatively         I have personally performed the services described in this document as scribed by the above individual and it is both accurate and complete. Nani Watson MD 03/15/25

## 2025-03-17 ENCOUNTER — ANESTHESIA (OUTPATIENT)
Dept: PERIOP | Facility: HOSPITAL | Age: 80
End: 2025-03-17
Payer: MEDICARE

## 2025-03-17 ENCOUNTER — APPOINTMENT (OUTPATIENT)
Dept: GENERAL RADIOLOGY | Facility: HOSPITAL | Age: 80
End: 2025-03-17
Payer: MEDICARE

## 2025-03-17 ENCOUNTER — ANESTHESIA EVENT (OUTPATIENT)
Dept: PERIOP | Facility: HOSPITAL | Age: 80
End: 2025-03-17
Payer: MEDICARE

## 2025-03-17 ENCOUNTER — HOSPITAL ENCOUNTER (OUTPATIENT)
Facility: HOSPITAL | Age: 80
Discharge: HOME HEALTH | End: 2025-03-18
Attending: ORTHOPAEDIC SURGERY | Admitting: ORTHOPAEDIC SURGERY
Payer: MEDICARE

## 2025-03-17 DIAGNOSIS — Z78.9 IMPAIRED MOBILITY AND ADLS: ICD-10-CM

## 2025-03-17 DIAGNOSIS — R26.2 DIFFICULTY IN WALKING: Primary | ICD-10-CM

## 2025-03-17 DIAGNOSIS — Z74.09 IMPAIRED MOBILITY AND ADLS: ICD-10-CM

## 2025-03-17 DIAGNOSIS — M16.0 OSTEOARTHRITIS OF BOTH HIPS, UNSPECIFIED OSTEOARTHRITIS TYPE: ICD-10-CM

## 2025-03-17 PROCEDURE — 25010000002 MIDAZOLAM PER 1MG: Performed by: ANESTHESIOLOGY

## 2025-03-17 PROCEDURE — 94799 UNLISTED PULMONARY SVC/PX: CPT

## 2025-03-17 PROCEDURE — 25010000002 ESMOLOL 100 MG/10ML SOLUTION: Performed by: NURSE ANESTHETIST, CERTIFIED REGISTERED

## 2025-03-17 PROCEDURE — 63710000001 ACETAMINOPHEN EXTRA STRENGTH 500 MG TABLET: Performed by: ANESTHESIOLOGY

## 2025-03-17 PROCEDURE — A9270 NON-COVERED ITEM OR SERVICE: HCPCS | Performed by: INTERNAL MEDICINE

## 2025-03-17 PROCEDURE — 63710000001 SENNOSIDES-DOCUSATE 8.6-50 MG TABLET: Performed by: ORTHOPAEDIC SURGERY

## 2025-03-17 PROCEDURE — 25010000002 DEXAMETHASONE PER 1 MG: Performed by: NURSE ANESTHETIST, CERTIFIED REGISTERED

## 2025-03-17 PROCEDURE — 25010000002 HYDROMORPHONE PER 4 MG: Performed by: ORTHOPAEDIC SURGERY

## 2025-03-17 PROCEDURE — 25010000002 FENTANYL CITRATE (PF) 50 MCG/ML SOLUTION: Performed by: NURSE ANESTHETIST, CERTIFIED REGISTERED

## 2025-03-17 PROCEDURE — 25010000002 KETOROLAC TROMETHAMINE PER 15 MG: Performed by: ORTHOPAEDIC SURGERY

## 2025-03-17 PROCEDURE — A9270 NON-COVERED ITEM OR SERVICE: HCPCS | Performed by: ANESTHESIOLOGY

## 2025-03-17 PROCEDURE — 25010000002 HYDROMORPHONE 1 MG/ML SOLUTION: Performed by: NURSE ANESTHETIST, CERTIFIED REGISTERED

## 2025-03-17 PROCEDURE — 25810000003 LACTATED RINGERS PER 1000 ML: Performed by: ANESTHESIOLOGY

## 2025-03-17 PROCEDURE — 63710000001 ACETAMINOPHEN EXTRA STRENGTH 500 MG TABLET: Performed by: ORTHOPAEDIC SURGERY

## 2025-03-17 PROCEDURE — 99214 OFFICE O/P EST MOD 30 MIN: CPT | Performed by: INTERNAL MEDICINE

## 2025-03-17 PROCEDURE — 76000 FLUOROSCOPY <1 HR PHYS/QHP: CPT

## 2025-03-17 PROCEDURE — 25010000002 EPINEPHRINE 1 MG/ML SOLUTION: Performed by: ORTHOPAEDIC SURGERY

## 2025-03-17 PROCEDURE — A9270 NON-COVERED ITEM OR SERVICE: HCPCS | Performed by: ORTHOPAEDIC SURGERY

## 2025-03-17 PROCEDURE — 73502 X-RAY EXAM HIP UNI 2-3 VIEWS: CPT

## 2025-03-17 PROCEDURE — 63710000001 CELECOXIB 100 MG CAPSULE: Performed by: ANESTHESIOLOGY

## 2025-03-17 PROCEDURE — 25010000002 ROPIVACAINE PER 1 MG: Performed by: ORTHOPAEDIC SURGERY

## 2025-03-17 PROCEDURE — 63710000001 OXYCODONE 5 MG TABLET: Performed by: NURSE ANESTHETIST, CERTIFIED REGISTERED

## 2025-03-17 PROCEDURE — 25010000002 LIDOCAINE PF 2% 2 % SOLUTION: Performed by: NURSE ANESTHETIST, CERTIFIED REGISTERED

## 2025-03-17 PROCEDURE — 63710000001 EMPAGLIFLOZIN 10 MG TABLET: Performed by: INTERNAL MEDICINE

## 2025-03-17 PROCEDURE — 63710000001 GABAPENTIN 300 MG CAPSULE: Performed by: INTERNAL MEDICINE

## 2025-03-17 PROCEDURE — C1776 JOINT DEVICE (IMPLANTABLE): HCPCS | Performed by: ORTHOPAEDIC SURGERY

## 2025-03-17 PROCEDURE — 97161 PT EVAL LOW COMPLEX 20 MIN: CPT

## 2025-03-17 PROCEDURE — 25810000003 SODIUM CHLORIDE 0.9 % SOLUTION: Performed by: ORTHOPAEDIC SURGERY

## 2025-03-17 PROCEDURE — 27130 TOTAL HIP ARTHROPLASTY: CPT | Performed by: ORTHOPAEDIC SURGERY

## 2025-03-17 PROCEDURE — A9270 NON-COVERED ITEM OR SERVICE: HCPCS | Performed by: NURSE ANESTHETIST, CERTIFIED REGISTERED

## 2025-03-17 PROCEDURE — 63710000001 OXYBUTYNIN 5 MG TABLET: Performed by: INTERNAL MEDICINE

## 2025-03-17 PROCEDURE — 25010000002 PROPOFOL 10 MG/ML EMULSION: Performed by: NURSE ANESTHETIST, CERTIFIED REGISTERED

## 2025-03-17 PROCEDURE — 25010000002 VANCOMYCIN 5 G RECONSTITUTED SOLUTION: Performed by: ORTHOPAEDIC SURGERY

## 2025-03-17 PROCEDURE — 63710000001 FAMOTIDINE 20 MG TABLET: Performed by: ORTHOPAEDIC SURGERY

## 2025-03-17 PROCEDURE — 25010000002 ONDANSETRON PER 1 MG: Performed by: ORTHOPAEDIC SURGERY

## 2025-03-17 PROCEDURE — 25010000002 SUGAMMADEX 200 MG/2ML SOLUTION: Performed by: NURSE ANESTHETIST, CERTIFIED REGISTERED

## 2025-03-17 PROCEDURE — 25810000003 LACTATED RINGERS PER 1000 ML: Performed by: ORTHOPAEDIC SURGERY

## 2025-03-17 PROCEDURE — 25010000002 ONDANSETRON PER 1 MG: Performed by: NURSE ANESTHETIST, CERTIFIED REGISTERED

## 2025-03-17 DEVICE — SCRW S/TAP 6.5X25MM: Type: IMPLANTABLE DEVICE | Site: ACETABULUM | Status: FUNCTIONAL

## 2025-03-17 DEVICE — TOTAL HIP PRIMARY: Type: IMPLANTABLE DEVICE | Site: ACETABULUM | Status: FUNCTIONAL

## 2025-03-17 DEVICE — HD FEM/HIP G7 BIOLOX/DELTA OPTN 36MM: Type: IMPLANTABLE DEVICE | Site: ACETABULUM | Status: FUNCTIONAL

## 2025-03-17 DEVICE — ADAPT HIP BIOLOX OPTN TYPE1 TPR MIN 3: Type: IMPLANTABLE DEVICE | Site: ACETABULUM | Status: FUNCTIONAL

## 2025-03-17 DEVICE — IMPLANTABLE DEVICE: Type: IMPLANTABLE DEVICE | Site: ACETABULUM | Status: FUNCTIONAL

## 2025-03-17 DEVICE — SHLL ACET OSSEOTI G7 3H SZE 52MM: Type: IMPLANTABLE DEVICE | Site: ACETABULUM | Status: FUNCTIONAL

## 2025-03-17 DEVICE — LINER ACET G7 NTRL E1 SZE 36MM: Type: IMPLANTABLE DEVICE | Site: ACETABULUM | Status: FUNCTIONAL

## 2025-03-17 RX ORDER — FENTANYL CITRATE 50 UG/ML
INJECTION, SOLUTION INTRAMUSCULAR; INTRAVENOUS AS NEEDED
Status: DISCONTINUED | OUTPATIENT
Start: 2025-03-17 | End: 2025-03-17 | Stop reason: SURG

## 2025-03-17 RX ORDER — DEXAMETHASONE SODIUM PHOSPHATE 4 MG/ML
INJECTION, SOLUTION INTRA-ARTICULAR; INTRALESIONAL; INTRAMUSCULAR; INTRAVENOUS; SOFT TISSUE AS NEEDED
Status: DISCONTINUED | OUTPATIENT
Start: 2025-03-17 | End: 2025-03-17 | Stop reason: SURG

## 2025-03-17 RX ORDER — EPHEDRINE SULFATE 50 MG/ML
INJECTION INTRAVENOUS AS NEEDED
Status: DISCONTINUED | OUTPATIENT
Start: 2025-03-17 | End: 2025-03-17 | Stop reason: SURG

## 2025-03-17 RX ORDER — PROMETHAZINE HYDROCHLORIDE 25 MG/1
25 TABLET ORAL ONCE AS NEEDED
Status: DISCONTINUED | OUTPATIENT
Start: 2025-03-17 | End: 2025-03-17 | Stop reason: HOSPADM

## 2025-03-17 RX ORDER — IPRATROPIUM BROMIDE AND ALBUTEROL SULFATE 2.5; .5 MG/3ML; MG/3ML
3 SOLUTION RESPIRATORY (INHALATION) EVERY 4 HOURS PRN
Status: DISCONTINUED | OUTPATIENT
Start: 2025-03-17 | End: 2025-03-18 | Stop reason: HOSPADM

## 2025-03-17 RX ORDER — MAGNESIUM HYDROXIDE 1200 MG/15ML
LIQUID ORAL AS NEEDED
Status: DISCONTINUED | OUTPATIENT
Start: 2025-03-17 | End: 2025-03-17 | Stop reason: HOSPADM

## 2025-03-17 RX ORDER — ONDANSETRON 2 MG/ML
INJECTION INTRAMUSCULAR; INTRAVENOUS AS NEEDED
Status: DISCONTINUED | OUTPATIENT
Start: 2025-03-17 | End: 2025-03-17 | Stop reason: SURG

## 2025-03-17 RX ORDER — PROMETHAZINE HYDROCHLORIDE 25 MG/1
25 SUPPOSITORY RECTAL ONCE AS NEEDED
Status: DISCONTINUED | OUTPATIENT
Start: 2025-03-17 | End: 2025-03-17 | Stop reason: HOSPADM

## 2025-03-17 RX ORDER — TRANEXAMIC ACID 10 MG/ML
1000 INJECTION, SOLUTION INTRAVENOUS ONCE
Status: COMPLETED | OUTPATIENT
Start: 2025-03-17 | End: 2025-03-17

## 2025-03-17 RX ORDER — ACETAMINOPHEN 500 MG
1000 TABLET ORAL ONCE
Status: COMPLETED | OUTPATIENT
Start: 2025-03-17 | End: 2025-03-17

## 2025-03-17 RX ORDER — SODIUM CHLORIDE 9 MG/ML
40 INJECTION, SOLUTION INTRAVENOUS AS NEEDED
Status: DISCONTINUED | OUTPATIENT
Start: 2025-03-17 | End: 2025-03-18 | Stop reason: HOSPADM

## 2025-03-17 RX ORDER — HYDROCODONE BITARTRATE AND ACETAMINOPHEN 7.5; 325 MG/1; MG/1
1 TABLET ORAL EVERY 4 HOURS PRN
Status: DISCONTINUED | OUTPATIENT
Start: 2025-03-17 | End: 2025-03-18 | Stop reason: HOSPADM

## 2025-03-17 RX ORDER — SODIUM CHLORIDE 0.9 % (FLUSH) 0.9 %
10 SYRINGE (ML) INJECTION AS NEEDED
Status: DISCONTINUED | OUTPATIENT
Start: 2025-03-17 | End: 2025-03-18 | Stop reason: HOSPADM

## 2025-03-17 RX ORDER — OXYBUTYNIN CHLORIDE 5 MG/1
5 TABLET ORAL 2 TIMES DAILY
Status: DISCONTINUED | OUTPATIENT
Start: 2025-03-17 | End: 2025-03-18 | Stop reason: HOSPADM

## 2025-03-17 RX ORDER — AMOXICILLIN 250 MG
2 CAPSULE ORAL 2 TIMES DAILY
Status: DISCONTINUED | OUTPATIENT
Start: 2025-03-17 | End: 2025-03-18 | Stop reason: HOSPADM

## 2025-03-17 RX ORDER — ONDANSETRON 4 MG/1
4 TABLET, ORALLY DISINTEGRATING ORAL EVERY 6 HOURS PRN
Status: DISCONTINUED | OUTPATIENT
Start: 2025-03-17 | End: 2025-03-18 | Stop reason: HOSPADM

## 2025-03-17 RX ORDER — PROMETHAZINE HYDROCHLORIDE 12.5 MG/1
12.5 SUPPOSITORY RECTAL EVERY 6 HOURS PRN
Status: DISCONTINUED | OUTPATIENT
Start: 2025-03-17 | End: 2025-03-18 | Stop reason: HOSPADM

## 2025-03-17 RX ORDER — LIDOCAINE HYDROCHLORIDE 20 MG/ML
INJECTION, SOLUTION EPIDURAL; INFILTRATION; INTRACAUDAL; PERINEURAL AS NEEDED
Status: DISCONTINUED | OUTPATIENT
Start: 2025-03-17 | End: 2025-03-17 | Stop reason: SURG

## 2025-03-17 RX ORDER — ONDANSETRON 2 MG/ML
4 INJECTION INTRAMUSCULAR; INTRAVENOUS ONCE AS NEEDED
Status: DISCONTINUED | OUTPATIENT
Start: 2025-03-17 | End: 2025-03-17 | Stop reason: HOSPADM

## 2025-03-17 RX ORDER — FERROUS SULFATE 325(65) MG
325 TABLET ORAL
Status: DISCONTINUED | OUTPATIENT
Start: 2025-03-18 | End: 2025-03-18 | Stop reason: HOSPADM

## 2025-03-17 RX ORDER — HYDROCODONE BITARTRATE AND ACETAMINOPHEN 7.5; 325 MG/1; MG/1
2 TABLET ORAL EVERY 4 HOURS PRN
Status: DISCONTINUED | OUTPATIENT
Start: 2025-03-17 | End: 2025-03-18 | Stop reason: HOSPADM

## 2025-03-17 RX ORDER — PROMETHAZINE HYDROCHLORIDE 25 MG/1
12.5 TABLET ORAL EVERY 6 HOURS PRN
Status: DISCONTINUED | OUTPATIENT
Start: 2025-03-17 | End: 2025-03-18 | Stop reason: HOSPADM

## 2025-03-17 RX ORDER — SODIUM CHLORIDE, SODIUM LACTATE, POTASSIUM CHLORIDE, CALCIUM CHLORIDE 600; 310; 30; 20 MG/100ML; MG/100ML; MG/100ML; MG/100ML
100 INJECTION, SOLUTION INTRAVENOUS CONTINUOUS PRN
Status: DISCONTINUED | OUTPATIENT
Start: 2025-03-17 | End: 2025-03-17 | Stop reason: HOSPADM

## 2025-03-17 RX ORDER — NALOXONE HCL 0.4 MG/ML
0.4 VIAL (ML) INJECTION
Status: DISCONTINUED | OUTPATIENT
Start: 2025-03-17 | End: 2025-03-18 | Stop reason: HOSPADM

## 2025-03-17 RX ORDER — ESMOLOL HYDROCHLORIDE 10 MG/ML
INJECTION INTRAVENOUS AS NEEDED
Status: DISCONTINUED | OUTPATIENT
Start: 2025-03-17 | End: 2025-03-17 | Stop reason: SURG

## 2025-03-17 RX ORDER — MIDAZOLAM HYDROCHLORIDE 2 MG/2ML
1.5 INJECTION, SOLUTION INTRAMUSCULAR; INTRAVENOUS ONCE
Status: COMPLETED | OUTPATIENT
Start: 2025-03-17 | End: 2025-03-17

## 2025-03-17 RX ORDER — SODIUM CHLORIDE 9 MG/ML
40 INJECTION, SOLUTION INTRAVENOUS AS NEEDED
Status: DISCONTINUED | OUTPATIENT
Start: 2025-03-17 | End: 2025-03-17 | Stop reason: HOSPADM

## 2025-03-17 RX ORDER — OXYCODONE HYDROCHLORIDE 5 MG/1
5 TABLET ORAL
Refills: 0 | Status: COMPLETED | OUTPATIENT
Start: 2025-03-17 | End: 2025-03-17

## 2025-03-17 RX ORDER — GABAPENTIN 300 MG/1
600 CAPSULE ORAL NIGHTLY
Status: DISCONTINUED | OUTPATIENT
Start: 2025-03-17 | End: 2025-03-18 | Stop reason: HOSPADM

## 2025-03-17 RX ORDER — PROPOFOL 10 MG/ML
VIAL (ML) INTRAVENOUS AS NEEDED
Status: DISCONTINUED | OUTPATIENT
Start: 2025-03-17 | End: 2025-03-17 | Stop reason: SURG

## 2025-03-17 RX ORDER — FAMOTIDINE 20 MG/1
40 TABLET, FILM COATED ORAL DAILY
Status: DISCONTINUED | OUTPATIENT
Start: 2025-03-17 | End: 2025-03-18 | Stop reason: HOSPADM

## 2025-03-17 RX ORDER — ACETAMINOPHEN 500 MG
1000 TABLET ORAL EVERY 8 HOURS
Status: DISCONTINUED | OUTPATIENT
Start: 2025-03-17 | End: 2025-03-18 | Stop reason: HOSPADM

## 2025-03-17 RX ORDER — BISACODYL 10 MG
10 SUPPOSITORY, RECTAL RECTAL DAILY PRN
Status: DISCONTINUED | OUTPATIENT
Start: 2025-03-17 | End: 2025-03-18 | Stop reason: HOSPADM

## 2025-03-17 RX ORDER — SODIUM CHLORIDE, SODIUM LACTATE, POTASSIUM CHLORIDE, CALCIUM CHLORIDE 600; 310; 30; 20 MG/100ML; MG/100ML; MG/100ML; MG/100ML
100 INJECTION, SOLUTION INTRAVENOUS CONTINUOUS
Status: ACTIVE | OUTPATIENT
Start: 2025-03-17 | End: 2025-03-17

## 2025-03-17 RX ORDER — SODIUM CHLORIDE 0.9 % (FLUSH) 0.9 %
10 SYRINGE (ML) INJECTION AS NEEDED
Status: DISCONTINUED | OUTPATIENT
Start: 2025-03-17 | End: 2025-03-17 | Stop reason: HOSPADM

## 2025-03-17 RX ORDER — KETOROLAC TROMETHAMINE 15 MG/ML
15 INJECTION, SOLUTION INTRAMUSCULAR; INTRAVENOUS EVERY 6 HOURS
Status: COMPLETED | OUTPATIENT
Start: 2025-03-17 | End: 2025-03-18

## 2025-03-17 RX ORDER — ROCURONIUM BROMIDE 10 MG/ML
INJECTION, SOLUTION INTRAVENOUS AS NEEDED
Status: DISCONTINUED | OUTPATIENT
Start: 2025-03-17 | End: 2025-03-17 | Stop reason: SURG

## 2025-03-17 RX ORDER — CELECOXIB 100 MG/1
200 CAPSULE ORAL ONCE
Status: COMPLETED | OUTPATIENT
Start: 2025-03-17 | End: 2025-03-17

## 2025-03-17 RX ORDER — ONDANSETRON 2 MG/ML
4 INJECTION INTRAMUSCULAR; INTRAVENOUS EVERY 6 HOURS PRN
Status: DISCONTINUED | OUTPATIENT
Start: 2025-03-17 | End: 2025-03-18 | Stop reason: HOSPADM

## 2025-03-17 RX ORDER — ACETAMINOPHEN 325 MG/1
325 TABLET ORAL EVERY 4 HOURS PRN
Status: DISCONTINUED | OUTPATIENT
Start: 2025-03-17 | End: 2025-03-18 | Stop reason: HOSPADM

## 2025-03-17 RX ORDER — SODIUM CHLORIDE 0.9 % (FLUSH) 0.9 %
10 SYRINGE (ML) INJECTION EVERY 12 HOURS SCHEDULED
Status: DISCONTINUED | OUTPATIENT
Start: 2025-03-17 | End: 2025-03-18 | Stop reason: HOSPADM

## 2025-03-17 RX ORDER — CITALOPRAM HYDROBROMIDE 20 MG/1
10 TABLET ORAL DAILY
Status: DISCONTINUED | OUTPATIENT
Start: 2025-03-18 | End: 2025-03-18 | Stop reason: HOSPADM

## 2025-03-17 RX ORDER — ENOXAPARIN SODIUM 100 MG/ML
40 INJECTION SUBCUTANEOUS DAILY
Status: DISCONTINUED | OUTPATIENT
Start: 2025-03-18 | End: 2025-03-18 | Stop reason: HOSPADM

## 2025-03-17 RX ADMIN — ESMOLOL HYDROCHLORIDE 10 MG: 10 INJECTION INTRAVENOUS at 11:17

## 2025-03-17 RX ADMIN — ROCURONIUM BROMIDE 10 MG: 10 INJECTION, SOLUTION INTRAVENOUS at 11:10

## 2025-03-17 RX ADMIN — CELECOXIB 200 MG: 100 CAPSULE ORAL at 08:58

## 2025-03-17 RX ADMIN — PROPOFOL 40 MG: 10 INJECTION, EMULSION INTRAVENOUS at 10:46

## 2025-03-17 RX ADMIN — HYDROMORPHONE HYDROCHLORIDE 0.5 MG: 1 INJECTION, SOLUTION INTRAMUSCULAR; INTRAVENOUS; SUBCUTANEOUS at 12:33

## 2025-03-17 RX ADMIN — HYDROMORPHONE HYDROCHLORIDE 0.5 MG: 1 INJECTION, SOLUTION INTRAMUSCULAR; INTRAVENOUS; SUBCUTANEOUS at 10:57

## 2025-03-17 RX ADMIN — ONDANSETRON 4 MG: 2 INJECTION INTRAMUSCULAR; INTRAVENOUS at 19:46

## 2025-03-17 RX ADMIN — ACETAMINOPHEN 1000 MG: 500 TABLET ORAL at 08:58

## 2025-03-17 RX ADMIN — OXYCODONE HYDROCHLORIDE 5 MG: 5 TABLET ORAL at 12:37

## 2025-03-17 RX ADMIN — PROPOFOL 120 MG: 10 INJECTION, EMULSION INTRAVENOUS at 10:17

## 2025-03-17 RX ADMIN — EMPAGLIFLOZIN 10 MG: 10 TABLET, FILM COATED ORAL at 18:23

## 2025-03-17 RX ADMIN — SUGAMMADEX 200 MG: 100 INJECTION, SOLUTION INTRAVENOUS at 11:53

## 2025-03-17 RX ADMIN — ACETAMINOPHEN 1000 MG: 500 TABLET ORAL at 15:12

## 2025-03-17 RX ADMIN — EPHEDRINE SULFATE 10 MG: 50 INJECTION INTRAVENOUS at 10:41

## 2025-03-17 RX ADMIN — Medication 10 ML: at 15:12

## 2025-03-17 RX ADMIN — TRANEXAMIC ACID 1000 MG: 10 INJECTION, SOLUTION INTRAVENOUS at 11:30

## 2025-03-17 RX ADMIN — OXYBUTYNIN CHLORIDE 5 MG: 5 TABLET ORAL at 20:36

## 2025-03-17 RX ADMIN — KETOROLAC TROMETHAMINE 15 MG: 15 INJECTION, SOLUTION INTRAMUSCULAR; INTRAVENOUS at 15:12

## 2025-03-17 RX ADMIN — ROCURONIUM BROMIDE 40 MG: 10 INJECTION, SOLUTION INTRAVENOUS at 10:17

## 2025-03-17 RX ADMIN — OXYCODONE HYDROCHLORIDE 5 MG: 5 TABLET ORAL at 12:20

## 2025-03-17 RX ADMIN — FENTANYL CITRATE 25 MCG: 50 INJECTION, SOLUTION INTRAMUSCULAR; INTRAVENOUS at 10:15

## 2025-03-17 RX ADMIN — VANCOMYCIN HYDROCHLORIDE 1250 MG: 5 INJECTION, POWDER, LYOPHILIZED, FOR SOLUTION INTRAVENOUS at 22:43

## 2025-03-17 RX ADMIN — TRANEXAMIC ACID 1000 MG: 10 INJECTION, SOLUTION INTRAVENOUS at 09:50

## 2025-03-17 RX ADMIN — LIDOCAINE HYDROCHLORIDE 80 MG: 20 INJECTION, SOLUTION INTRAVENOUS at 10:15

## 2025-03-17 RX ADMIN — DEXAMETHASONE SODIUM PHOSPHATE 4 MG: 4 INJECTION, SOLUTION INTRAMUSCULAR; INTRAVENOUS at 10:32

## 2025-03-17 RX ADMIN — FAMOTIDINE 40 MG: 20 TABLET, FILM COATED ORAL at 15:12

## 2025-03-17 RX ADMIN — SODIUM CHLORIDE, SODIUM LACTATE, POTASSIUM CHLORIDE, CALCIUM CHLORIDE 100 ML/HR: 20; 30; 600; 310 INJECTION, SOLUTION INTRAVENOUS at 15:12

## 2025-03-17 RX ADMIN — SODIUM CHLORIDE, SODIUM LACTATE, POTASSIUM CHLORIDE, AND CALCIUM CHLORIDE 100 ML/HR: .6; .31; .03; .02 INJECTION, SOLUTION INTRAVENOUS at 08:52

## 2025-03-17 RX ADMIN — VANCOMYCIN HYDROCHLORIDE 1250 MG: 5 INJECTION, POWDER, LYOPHILIZED, FOR SOLUTION INTRAVENOUS at 09:50

## 2025-03-17 RX ADMIN — EPHEDRINE SULFATE 10 MG: 50 INJECTION INTRAVENOUS at 11:32

## 2025-03-17 RX ADMIN — FENTANYL CITRATE 50 MCG: 50 INJECTION, SOLUTION INTRAMUSCULAR; INTRAVENOUS at 10:52

## 2025-03-17 RX ADMIN — KETOROLAC TROMETHAMINE 15 MG: 15 INJECTION, SOLUTION INTRAMUSCULAR; INTRAVENOUS at 20:35

## 2025-03-17 RX ADMIN — PROPOFOL 30 MG: 10 INJECTION, EMULSION INTRAVENOUS at 10:52

## 2025-03-17 RX ADMIN — HYDROMORPHONE HYDROCHLORIDE 0.5 MG: 1 INJECTION, SOLUTION INTRAMUSCULAR; INTRAVENOUS; SUBCUTANEOUS at 12:20

## 2025-03-17 RX ADMIN — ONDANSETRON 4 MG: 2 INJECTION INTRAMUSCULAR; INTRAVENOUS at 10:39

## 2025-03-17 RX ADMIN — FENTANYL CITRATE 25 MCG: 50 INJECTION, SOLUTION INTRAMUSCULAR; INTRAVENOUS at 10:46

## 2025-03-17 RX ADMIN — GABAPENTIN 600 MG: 300 CAPSULE ORAL at 20:35

## 2025-03-17 RX ADMIN — MIDAZOLAM HYDROCHLORIDE 1.5 MG: 1 INJECTION, SOLUTION INTRAMUSCULAR; INTRAVENOUS at 09:49

## 2025-03-17 RX ADMIN — SENNOSIDES AND DOCUSATE SODIUM 2 TABLET: 50; 8.6 TABLET ORAL at 20:35

## 2025-03-17 NOTE — THERAPY EVALUATION
Acute Care - Physical Therapy Initial Evaluation   [place]     Patient Name: Marci Dudley  : 1945  MRN: [id number]  Today's Date: 3/17/2025      Visit Dx:     ICD-10-CM ICD-9-CM   1. Difficulty in walking  R26.2 719.7   2. Osteoarthritis of both hips, unspecified osteoarthritis type  M16.0 715.95     Patient Active Problem List   Diagnosis    Hydronephrosis of right kidney    Adrenal nodule    Bilateral lumbar radiculopathy    Breast cancer    Essential hypertension    Gastroesophageal reflux disease without esophagitis    Neuropathy    OAB (overactive bladder)    CARLOS (obstructive sleep apnea)    Psoriasis    Pulmonary fibrosis    Age-related osteoporosis without current pathological fracture    Polycythemia    Vitamin B12 deficiency    Vitamin D deficiency    Recurrent major depression in partial remission    Medicare annual wellness visit, subsequent    Family history of colon cancer in mother    Bilateral leg pain    Uncontrolled daytime somnolence    Osteoarthritis of both hips    OA (osteoarthritis) of hip    Impaired fasting glucose     Past Medical History:   Diagnosis Date    Abnormal Pap smear of cervix     Anxiety     Arthritis     Bilateral lumbar radiculopathy     Breast cancer     NO STICK/BP ON RIGHT ARM    Cancer 2005    Breast returned  had Both Breast removed. Radiation 2005 cancer pill -     Chest pain     on and off    Cholelithiasis 1971    Removed in     Chronic cough     Chronic kidney disease     follows with    Colon polyp     Tested removed no cancer    Depression     Treated with citalopram hydrobromide 10mg    Difficulty walking     Pain from feet , hips      Diverticulosis 2009    Flare-up in  went to emergency 00    Esophageal dysphagia     swallowing dry food    Falls     R ankle unstable    Fibromyalgia, primary     My cancer doctor Dr Srivastava put me on Gabapentin 300mg    GERD (gastroesophageal reflux disease)     controled with  diet    H/O Kidney infection     HISTORY OF MULTIPLE    Hiatal hernia     History of gangrene     AS A CHILD    History of kidney stones     History of transfusion     AS A CHILD    HL (hearing loss) 2020    Wear hearing aids from [employer]    Hypertension     Incontinence     Kidney stone 2009    No problem till 2023    Low back pain     Lymphedema     RIGHT ARM    Movement disorder 2023    Gets worse legs do not work well    Neuropathy     Nonsenile cataract     OAB (overactive bladder)     Osteopenia     Pulmonary fibrosis     follows with Dr. Powell    Renal insufficiency 1971    Surgery on blatter R kidney was full of scare tissues    Right ankle instability     right ankle and leg instabilty    Sleep apnea     CPAP    SOB (shortness of breath)     Urinary incontinence 2001    Need #6 pads 2 or 3 times daily.    Visual impairment 1990    Wear glasses have skin from eye lid that hangs over pupils     Past Surgical History:   Procedure Laterality Date    APPENDECTOMY      BLADDER SURGERY  2024    Surgery Jan, March & 2times April 2&3    BREAST LUMPECTOMY Right 2005    CARDIAC CATHETERIZATION      CHOLECYSTECTOMY      COLONOSCOPY  2018    Saw doctor Aug. 2024, refered me to specialists due to a problem.    CYSTOSCOPY URETEROSCOPY LASER LITHOTRIPSY Right 04/02/2024    Procedure: RIGHT  URETEROSCOPY  STONE BASKET EXTRACTION AND RIGHT STENT removal;  Surgeon: Jl Bergeron MD;  Location[facility] MAIN OR;  Service: Urology;  Laterality: Right;    CYSTOSCOPY W/ URETERAL STENT PLACEMENT      CYSTOSCOPY W/ URETERAL STENT PLACEMENT Right 04/03/2024    Procedure: CYSTOSCOPY, RIGHT RETROGRADE,  URETERAL CATHETER/STENT INSERTION;  Surgeon: Tiffanie Zhu MD;  Location: [facility] MAIN OR;  Service: Urology;  Laterality: Right;    ENDOSCOPY      egd & dilitation    FRACTURE SURGERY  1953    Compound broken L arm.    HEMORRHOIDECTOMY      HERNIA REPAIR  1980?    RECTAL hernia    HIP SURGERY Left 2012    BONE SPUR     KIDNEY STONE SURGERY  2024    Listed above Jan, March & April    KIDNEY SURGERY      MASTECTOMY Bilateral 2012    RECTOCELE REPAIR      URETEROSCOPY LASER LITHOTRIPSY WITH STENT INSERTION Right 03/05/2024    Procedure: RIGHT URETEROSCOPY LASER LITHOTRIPSY, STONE BASKET EXTRACTION AND RIGHT STENT EXCHANGE;  Surgeon: Jl Bergeron MD;  Location: [facility] MAIN OR;  Service: Urology;  Laterality: Right;     PT Assessment (Last 12 Hours)       PT Evaluation and Treatment       Row Name 03/17/25 1300          Physical Therapy Time and Intention    Subjective Information no complaints (P)   -TM     Document Type evaluation (P)   -TM     Mode of Treatment individual therapy (P)   -TM     Patient Effort good (P)   -TM       Row Name 03/17/25 1300          General Information    Prior Level of Function independent:;all household mobility (P)   -TM     Existing Precautions/Restrictions fall (P)   -TM       Row Name 03/17/25 1300          Living Environment    Current Living Arrangements home (P)   -TM     Home Accessibility stairs to enter home (P)   -TM     People in Home spouse (P)   -TM     Primary Care Provided by self (P)   -TM       Row Name 03/17/25 1300          Home Main Entrance    Number of Stairs, Main Entrance four (P)   -TM     Stair Railings, Main Entrance railings safe and in good condition (P)   -TM       Row Name 03/17/25 1300          Range of Motion (ROM)    Range of Motion ROM is WNL;bilateral lower extremities (P)   -       Row Name 03/17/25 1300          Strength (Manual Muscle Testing)    Strength (Manual Muscle Testing) strength is WNL;bilateral lower extremities (P)   -       Row Name 03/17/25 1300          Mobility    Extremity Weight-bearing Status left lower extremity (P)   -TM     Left Lower Extremity (Weight-bearing Status) weight-bearing as tolerated (WBAT) (P)   -       Row Name 03/17/25 1300          Bed Mobility    Bed Mobility bed mobility (all) activities (P)   -TM     All  Activities, [place] (Bed Mobility) moderate assist (50% patient effort) (P)   -TM       Row Name 03/17/25 1300          Transfers    Transfers bed-chair transfer (P)   -TM       Row Name 03/17/25 1300          Bed-Chair Transfer    Bed-Chair [place] (Transfers) contact guard (P)   -TM     Assistive Device (Bed-Chair Transfers) walker, front-wheeled (P)   -TM       Row Name 03/17/25 1300          Gait/Stairs (Locomotion)    Gait/Stairs Locomotion gait/ambulation independence;gait/ambulation assistive device (P)   -TM     [place] Level (Gait) contact guard (P)   -TM     Assistive Device (Gait) walker, front-wheeled (P)   -TM     Patient was able to Ambulate yes (P)   -TM     Distance in Feet (Gait) 15 (P)   -TM       Row Name 03/17/25 1300          Balance    Balance Assessment standing dynamic balance (P)   -TM     Dynamic Standing Balance contact guard (P)   -TM     Position/Device Used, Standing Balance walker, front-wheeled (P)   -TM       Row Name             [REMOVED] Wound 04/03/24 0041 urethral meatus Other (comment)    Wound - Properties Group Placement Date: 04/03/24  -AM Placement Time: 0041  -AM Location: urethral meatus  -AM Primary Wound Type: Other  -AM, POINT OF ENTRY  Wound Outcome: Healed  -SH Removal Date: 03/17/25  -SH Removal Time: 0844 -SH    Retired Wound - Properties Group Placement Date: 04/03/24  -AM Placement Time: 0041  -AM Location: urethral meatus  -AM Primary Wound Type: Other  -AM, POINT OF ENTRY  Wound Outcome: Healed  -SH Removal Date: 03/17/25  -SH Removal Time: 0844  -SH    Retired Wound - Properties Group Placement Date: 04/03/24  -AM Placement Time: 0041  -AM Location: urethral meatus  -AM Primary Wound Type: Other  -AM, POINT OF ENTRY  Removal Date: 03/17/25  -SH Removal Time: 0844 -SH Wound Outcome: Healed  -SH    Retired Wound - Properties Group Date first assessed: 04/03/24  -AM Time first assessed: 0041  -AM Location: urethral meatus  -AM Primary Wound Type:  Other  -AM, POINT OF ENTRY  Resolution Date: 03/17/25  - Resolution Time: 0844 -SH Wound Outcome: Healed  -SH      Row Name             Wound 03/17/25 1050 Left anterior hip Surgical Closed Surgical Incision    Wound - Properties Group Placement Date: 03/17/25  -SC Placement Time: 1050  -SC Present on Original Admission: N  -SC Side: Left  -SC Orientation: anterior  -SC Location: hip  -SC Primary Wound Type: Surgical  -SC Secondary Wound Type - Surgical: Closed Surgi  -SC    Retired Wound - Properties Group Placement Date: 03/17/25  -SC Placement Time: 1050  -SC Present on Original Admission: N  -SC Side: Left  -SC Orientation: anterior  -SC Location: hip  -SC    Retired Wound - Properties Group Placement Date: 03/17/25  -SC Placement Time: 1050  -SC Present on Original Admission: N  -SC Side: Left  -SC Orientation: anterior  -SC Location: hip  -SC    Retired Wound - Properties Group Date first assessed: 03/17/25  -SC Time first assessed: 1050  -SC Present on Original Admission: N  -SC Side: Left  -SC Location: hip  -SC      Row Name 03/17/25 1300          Plan of Care Review    Plan of Care Reviewed With patient (P)   -TM     Outcome Evaluation Pt presents with balance deficits and difficulty with ambulation. Pt requires skilled therapy services (P)   -TM       Row Name 03/17/25 1300          Therapy Assessment/Plan (PT)    Patient/Family Therapy Goals Statement (PT) walk without pain (P)   -TM     Rehab Potential (PT) good (P)   -TM     Criteria for Skilled Interventions Met (PT) skilled treatment is necessary (P)   -TM     Therapy Frequency (PT) 2 times/day (P)   -TM     Predicted Duration of Therapy Intervention (PT) 10 days (P)   -TM     Problem List (PT) problems related to;balance;mobility;strength;range of motion (ROM) (P)   -TM     Activity Limitations Related to Problem List (PT) unable to ambulate safely (P)   -TM       Row Name 03/17/25 1300          PT Evaluation Complexity    History, PT Evaluation  Complexity no personal factors and/or comorbidities (P)   -TM     Examination of Body Systems (PT Eval Complexity) total of 4 or more elements (P)   -TM     Clinical Presentation (PT Evaluation Complexity) stable (P)   -TM     Clinical Decision Making (PT Evaluation Complexity) low complexity (P)   -TM     Overall Complexity (PT Evaluation Complexity) low complexity (P)   -TM       Row Name 03/17/25 1300          Therapy Plan Review/Discharge Plan (PT)    Therapy Plan Review (PT) evaluation/treatment results reviewed;spouse/significant other;patient (P)   -       Row Name 03/17/25 1300          Physical Therapy Goals    Transfer Goal Selection (PT) transfer, PT goal 1 (P)   -TM     Gait Training Goal Selection (PT) gait training, PT goal 1 (P)   -TM     ROM Goal Selection (PT) -- (P)   -TM     Strength Goal Selection (PT) -- (P)   -TM     Balance Goal Selection (PT) balance, PT goal 1 (P)   -TM       Row Name 03/17/25 1300          Transfer Goal 1 (PT)    Activity/Assistive Device (Transfer Goal 1, PT) bed-to-chair/chair-to-bed (P)   -TM     [place] Level/Cues Needed (Transfer Goal 1, PT) independent (P)   -TM     Time Frame (Transfer Goal 1, PT) 10 days (P)   -[facility] Name 03/17/25 1300          Gait Training Goal 1 (PT)    Activity/Assistive Device (Gait Training Goal 1, PT) gait (walking locomotion);assistive device use (P)   -TM     [place] Level (Gait Training Goal 1, PT) independent (P)   -TM     Distance (Gait Training Goal 1, PT) 300' (P)   -TM     Time Frame (Gait Training Goal 1, PT) 10 days (P)   -       Row Name 03/17/25 1300          ROM Goal 1 (PT)    ROM Goal 1 (PT) 0-105 degrees right knee (P)   -TM     Time Frame (ROM Goal 1, PT) 10 days (P)   -TM       Row Name 03/17/25 1300          Strength Goal 1 (PT)    Strength Goal 1 (PT) right knee extension 5/5 (P)   -TM     Time Frame (Strength Goal 1, PT) 10 days (P)   -TM       Row Name 03/17/25 1300          Balance Goal 1 (PT)     Activity/Assistive Device (Balance Goal) standing dynamic balance (P)   -TM     [place] Level/Cues Needed (Balance Goal 1, PT) independent (P)   -TM     Time Frame (Balance Goal 1, PT) 2 weeks (P)   -TM               User Key  (r) = Recorded By, (t) = Taken By, (c) = Cosigned By      Initials Name Provider Type    SC Kim Sandoval, RN Registered Nurse    China Montenegro RN Registered Nurse    Nithya Gamble RN Registered Nurse    TM Freeman Turner, PT Student PT Student                    Physical Therapy Education       Title: PT OT SLP Therapies (Done)       Topic: Physical Therapy (Done)       Point: Mobility training (Done)       Learning Progress Summary            Patient Acceptance, E,TB, VU by TM at 3/17/2025 1347                      Point: Home exercise program (Done)       Learning Progress Summary            Patient Acceptance, E,TB, VU by TM at 3/17/2025 1347                      Point: Body mechanics (Done)       Learning Progress Summary            Patient Acceptance, E,TB, VU by TM at 3/17/2025 1347                      Point: Precautions (Done)       Learning Progress Summary            Patient Acceptance, E,TB, VU by TM at 3/17/2025 1347                                      User Key       Initials Effective Dates Name Provider Type Discipline     02/04/25 -  Freeman Turner, PT Student PT Student PT                  PT Recommendation and Plan  Anticipated Discharge Disposition (PT): (P) home with outpatient therapy services  Planned Therapy Interventions (PT): (P) balance training, gait training, home exercise program, ROM (range of motion), strengthening, transfer training  Therapy Frequency (PT): (P) 2 times/day  Plan of Care Reviewed With: (P) patient  Outcome Evaluation: (P) Pt presents with balance deficits and difficulty with ambulation. Pt requires skilled therapy services   Outcome Measures       Row Name 03/17/25 1300             How much help from another person do you  currently need...    Turning from your back to your side while in flat bed without using bedrails? 4 (P)   -TM      Moving from lying on back to sitting on the side of a flat bed without bedrails? 2 (P)   -TM      Moving to and from a bed to a chair (including a wheelchair)? 3 (P)   -TM      Standing up from a chair using your arms (e.g., wheelchair, bedside chair)? 3 (P)   -TM      Climbing 3-5 steps with a railing? 3 (P)   -TM      To walk in hospital room? 3 (P)   -TM      AM-PAC 6 Clicks Score (PT) 18 (P)   -TM         Functional Assessment    Outcome Measure Options AM-PAC 6 Clicks Basic Mobility (PT) (P)   -TM                User Key  (r) = Recorded By, (t) = Taken By, (c) = Cosigned By      Initials Name Provider Type    TM Freeman Turner, PT Student PT Student                     Time Calculation:    PT Charges       Row Name 03/17/25 1343             Time Calculation    PT Received On 03/17/25 (P)   -TM      PT Goal Re-Cert Due Date 03/26/25 (P)   -TM         Untimed Charges    PT Eval/Re-eval Minutes 25 (P)   -TM         Total Minutes    Untimed Charges Total Minutes 25 (P)   -TM       Total Minutes 25 (P)   -TM                User Key  (r) = Recorded By, (t) = Taken By, (c) = Cosigned By      Initials Name Provider Type    TM Freeman Turner, PT Student PT Student                  Therapy Charges for Today       Code Description Service Date Service Provider Modifiers Qty    85180833115 HC PT EVAL LOW COMPLEXITY 2 3/17/2025 Freeman Turner, PT Student GP 1            PT G-Codes  Outcome Measure Options: (P) AM-PAC 6 Clicks Basic Mobility (PT)  AM-PAC 6 Clicks Score (PT): (P) 18    SUNSHINE Covington  3/17/2025

## 2025-03-17 NOTE — OP NOTE
TOTAL HIP ARTHROPLASTY ANTERIOR  Procedure Report    Patient Name:  Marci Dudley  YOB: 1945    Date of Surgery:  3/17/2025     Indications:  Advanced hip arthritis, failed conservative care    Pre-op Diagnosis:   Osteoarthritis of both hips, unspecified osteoarthritis type [M16.0]       Post-Op Diagnosis Codes:     * Osteoarthritis of both hips, unspecified osteoarthritis type [M16.0]    Procedure(s):  LEFT TOTAL HIP ARTHROPLASTY ANTERIOR    Staff:  Surgeon(s):  Nani Watson MD    Assistant: Fern Zapata; Man Williamson    Surgical Approach: Hip Direct Anterior (Antoine-Floyd)        Anesthesia: General    Estimated Blood Loss: 100ml    Implants:    Implant Name Type Inv. Item Serial No.  Lot No. LRB No. Used Action   SHLL ACET OSSEOTI G7 3H DONAVAN 52MM - [id number] Implant SHLL ACET OSSEOTI G7 3H DONAVAN 52MM  [employer] [id number] Left 1 Implanted   SCRW S/TAP 6.5X25MM - [id number] Implant SCRW S/TAP 6.5X25MM  [employer] [id number] Left 1 Implanted   LINER ACET G7 NTRL E1 DONAVAN 36MM - [id number] Implant LINER ACET G7 NTRL E1 DONAVAN 36MM  [employer] [id number] Left 1 Implanted   STEM FEM/HIP TAPERLOC COMPL MICROPLASTY HI/OFFST SZ16 - [id number] Implant STEM FEM/HIP TAPERLOC COMPL MICROPLASTY HI/OFFST SZ16  [employer] [id number] Left 1 Implanted   HD FEM/HIP G7 BIOLOX/DELTA OPTN 36MM - [id number] Implant HD FEM/HIP G7 BIOLOX/DELTA OPTN 36MM  [employer] [id number] Left 1 Implanted   ADAPT HIP BIOLOX OPTN TYPE1 TPR MIN 3 - [id number] Implant ADAPT HIP BIOLOX OPTN TYPE1 TPR MIN 3  [employer] [id number] Left 1 Implanted       Specimen:          None        Findings: Advanced arthritis    Complications:  None    Description of Procedure: The patient went to the operating room and  underwent anesthesia, received a preoperative antibiotic, was placed on the [place] table and was then prepped and draped in standard fashion. A standard anterior hip incision was made. The interval was found and  retractors were placed. The capsule was then opened. The femoral neck was identified. Retractors were placed around the neck. The femoral neck cut was then made and then the head was removed from the acetabulum. Acetabular retractors were then placed. The labrum was removed. C-arm fluoroscopy was used to help guide the reaming for the acetabulum. This was sequentially reamed and then the appropriate size shell was then inserted, followed by a screw and then the  liner. The bed was raised, the leg was dropped, and femoral exposure was obtained. This was then opened using a rat-tail reamer and then sequentially broached  and then a stem was inserted. Leg lengths were measured after reduction and a ceramic head was then chosen. This was then thoroughly irrigated, tranexamic acid and local were injected, and then closed using #1 Vicryl, 2-0 Vicryl and staples. Sterile dressing was applied. The patient was then taken to recovery in stable condition. There were no complications.    Assistant: Leonora Zapata Tambrea  was responsible for performing the following activities: Retraction, Suction, Irrigation, Closing, and Placing Dressing and their skilled assistance was necessary for the success of this case.    Nani Watson MD     Date: 3/17/2025  Time: 11:50 EDT

## 2025-03-17 NOTE — ANESTHESIA PREPROCEDURE EVALUATION
Anesthesia Evaluation     Patient summary reviewed and Nursing notes reviewed   no history of anesthetic complications:   NPO Solid Status: > 8 hours  NPO Liquid Status: > 2 hours           Airway   Mallampati: II  TM distance: >3 FB  Neck ROM: full  No difficulty expected  Dental      Pulmonary - normal exam    breath sounds clear to auscultation  (+) ,shortness of breath, sleep apnea  Cardiovascular - normal exam  Exercise tolerance: good (4-7 METS)    Rhythm: regular  Rate: normal    (+) hypertension      Neuro/Psych  (+) numbness, psychiatric history Depression  GI/Hepatic/Renal/Endo    (+) hiatal hernia, GERD well controlled, renal disease- stones    Musculoskeletal     (+) myalgias  Abdominal    Substance History - negative use     OB/GYN negative ob/gyn ROS         Other   arthritis,     ROS/Med Hx Other: RECENT CP/SOA. ABN EKG. PT TO SEE CARDS FOR CLEARANCE- PER DR. CANO/ OLIVIA-  SEEN PATIENT 3/12/25 WITH CARDS AND CLEARED FOR PROCEDURE.- ECHO 3/113/25 EF 55%. ELM               Anesthesia Plan    ASA 2     general and ERAS Protocol     Reason for not using neuraxial anesthesia or peripheral nerve block: Anesthesiologist Preference and Patient Preference  (Patient understands anesthesia not responsible for dental damage.)  intravenous induction     Anesthetic plan, risks, benefits, and alternatives have been provided, discussed and informed consent has been obtained with: patient.  Pre-procedure education provided  Use of blood products discussed with patient .    Plan discussed with CRNA.    CODE STATUS:

## 2025-03-17 NOTE — PLAN OF CARE
Goal Outcome Evaluation:           Progress: improving  Outcome Evaluation: Patient is alert and oriented, with intermittent confusion. On 2L NC with ETCO2 monitoring. Left total hip replacement done today by Dr. Watson, assist x1 WBAT. Voiding without difficulty. Plan to discharge home tomorrow.  will be her ride. Meds to bed.     Mariela Mejia RN

## 2025-03-17 NOTE — ANESTHESIA POSTPROCEDURE EVALUATION
Patient: Marci Dudley    Procedure Summary       Date: 03/17/25 Room / Location: [facility] OR 02 / [facility] MAIN OR    Anesthesia Start: 1009 Anesthesia Stop: 1202    Procedure: LEFT TOTAL HIP ARTHROPLASTY ANTERIOR (Left: Hip) Diagnosis:       Osteoarthritis of both hips, unspecified osteoarthritis type      (Osteoarthritis of both hips, unspecified osteoarthritis type [M16.0])    Surgeons: Nani Watson MD Provider: Keith Sandoval MD    Anesthesia Type: general, ERAS Protocol ASA Status: 2            Anesthesia Type: general, ERAS Protocol    Vitals  Vitals Value Taken Time   /70 03/17/25 12:25   Temp 36.1 °C (96.9 °F) 03/17/25 12:00   Pulse 95 03/17/25 12:28   Resp 20 03/17/25 12:20   SpO2 91 % 03/17/25 12:28   Vitals shown include unfiled device data.        Post Anesthesia Care and Evaluation    Patient location during evaluation: bedside  Patient participation: complete - patient participated  Level of consciousness: awake and alert  Pain management: adequate    Airway patency: patent  Anesthetic complications: No anesthetic complications  PONV Status: none  Cardiovascular status: acceptable  Respiratory status: acceptable  Hydration status: acceptable    Comments: An Anesthesiologist personally participated in the most demanding procedures (including induction and emergence if applicable) in the anesthesia plan, monitored the course of anesthesia administration at frequent intervals and remained physically present and available for immediate diagnosis and treatment of emergencies.

## 2025-03-17 NOTE — H&P
[facility]  Date: 3[facility]17/2025   Patient Name: Marci Dudley  : 1945  MRN: [id number]  Primary Care Physician:  Conor Mosquera MD  Date of admission: 3/17/2025    Subjective   Subjective     Chief Complaint: Blood pressure and sleep apnea  management in a patient postop left total hip arthroplasty    HPI:    Marci Dudley is a 79 y.o. female with past ministry of hypertension, depression, chronic diastolic CHF follows with Dr. Ho, anxiety disorder, history of breast cancer s/p mastectomy, CARLOS on CPAP, right bundle block, fibromyalgia, ILD not on oxygen and DJD involving both hips.  Patient is seen after left total hip arthroplasty.  Patient is sleepy but wakes up easily.  Left hip pain is tolerable.  Denies any numbness or tingling in the toes.  No chest pain or shortness of breath.  No nausea or vomiting.        Personal History     Past Medical History:  Past Medical History:   Diagnosis Date    Abnormal Pap smear of cervix     Anxiety     Arthritis     Bilateral lumbar radiculopathy     Breast cancer     NO STICK/BP ON RIGHT ARM    Cancer 2005    Breast returned  had Both Breast removed. Radiation 2005 cancer pill -     Chest pain     on and off    Cholelithiasis 1971    Removed in     Chronic cough     Chronic kidney disease     follows with    Colon polyp     Tested removed no cancer    Depression     Treated with citalopram hydrobromide 10mg    Difficulty walking     Pain from feet , hips      Diverticulosis 2009    Flare-up in  went to emergency 00    Esophageal dysphagia     swallowing dry food    Falls     R ankle unstable    Fibromyalgia, primary     My cancer doctor Dr Srivastava put me on Gabapentin 300mg    GERD (gastroesophageal reflux disease)     controled with diet    H/O Kidney infection     HISTORY OF MULTIPLE    Hiatal hernia     History of gangrene     AS A CHILD    History of kidney stones     History of  transfusion     AS A CHILD    HL (hearing loss) 2020    Wear hearing aids from [employer]    Hypertension     Incontinence     Kidney stone 2009    No problem till 2023    Low back pain     Lymphedema     RIGHT ARM    Movement disorder 2023    Gets worse legs do not work well    Neuropathy     Nonsenile cataract     OAB (overactive bladder)     Osteopenia     Pulmonary fibrosis     follows with Dr. Powell    Renal insufficiency 1971    Surgery on louiser R kidney was full of scare tissues    Right ankle instability     right ankle and leg instabilty    Sleep apnea     CPAP    SOB (shortness of breath)     Urinary incontinence 2001    Need #6 pads 2 or 3 times daily.    Visual impairment 1990    Wear glasses have skin from eye lid that hangs over pupils       Past Surgical History:  Past Surgical History:   Procedure Laterality Date    APPENDECTOMY      BLADDER SURGERY  2024    Surgery Jan, March & 2times April 2&3    BREAST LUMPECTOMY Right 2005    CARDIAC CATHETERIZATION      CHOLECYSTECTOMY      COLONOSCOPY  2018    Saw doctor Aug. 2024, refered me to specialists due to a problem.    CYSTOSCOPY URETEROSCOPY LASER LITHOTRIPSY Right 04/02/2024    Procedure: RIGHT  URETEROSCOPY  STONE BASKET EXTRACTION AND RIGHT STENT removal;  Surgeon: Jl Bergeron MD;  Location: [facility] MAIN OR;  Service: Urology;  Laterality: Right;    CYSTOSCOPY W/ URETERAL STENT PLACEMENT      CYSTOSCOPY W/ URETERAL STENT PLACEMENT Right 04/03/2024    Procedure: CYSTOSCOPY, RIGHT RETROGRADE,  URETERAL CATHETER/STENT INSERTION;  Surgeon: Tiffanie Zhu MD;  Location: [facility] MAIN OR;  Service: Urology;  Laterality: Right;    ENDOSCOPY      egd & dilitation    FRACTURE SURGERY  1953    Compound broken L arm.    HEMORRHOIDECTOMY      HERNIA REPAIR  1980?    RECTAL hernia    HIP SURGERY Left 2012    BONE SPUR    KIDNEY STONE SURGERY  2024    Listed above Jan, March & April    KIDNEY SURGERY      MASTECTOMY Bilateral 2012     RECTOCELE REPAIR      URETEROSCOPY LASER LITHOTRIPSY WITH STENT INSERTION Right 03/05/2024    Procedure: RIGHT URETEROSCOPY LASER LITHOTRIPSY, STONE BASKET EXTRACTION AND RIGHT STENT EXCHANGE;  Surgeon: Jl Bergeron MD;  Location: [facility] OR;  Service: Urology;  Laterality: Right;       Family History:   family history includes Arthritis in her maternal aunt and sister; Cancer in her father, mother, sister, and sister; Neuropathy in her sister.    Social History:    reports that she has never smoked. She has never been exposed to tobacco smoke. She has never used smokeless tobacco. She reports that she does not drink alcohol and does not use drugs.    Home Medications:  baclofen, citalopram, empagliflozin, gabapentin, lisinopril, oxybutynin, and spironolactone    Allergies:  Allergies   Allergen Reactions    Benzoin Anaphylaxis and Hives    Ciprofloxacin Hives and Shortness Of Breath    Iodine Anaphylaxis    Penicillins Anaphylaxis, Itching and Swelling    Adhesive Tape Rash    Latex Hives and Rash    Levofloxacin Rash       Review of Systems   All systems were reviewed and negative except for: H&P    Objective   Objective     Vitals:   Temp:  [96.9 °F (36.1 °C)-97.9 °F (36.6 °C)] 97.5 °F (36.4 °C)  Heart Rate:  [] 79  Resp:  [13-24] 16  BP: (101-169)/(60-98) 101/63  Flow (L/min) (Oxygen Therapy):  [2-5] 2  FiO2 (%):  [60 %-80 %] 80 %    Physical Exam    Constitutional: Awake, alert, no acute distress   Eyes: Pupils equal, sclerae anicteric, no conjunctival injection   HENT: NCAT, mucous membranes moist   Neck: Supple, no thyromegaly, no lymphadenopathy, trachea midline   Respiratory: Clear to auscultation bilaterally, nonlabored respirations    Cardiovascular: RRR, no murmurs, rubs, or gallops, palpable pedal pulses bilaterally   Gastrointestinal: Positive bowel sounds, soft, nontender, nondistended   Musculoskeletal: Left hip dressed, restricted range of motion, no bilateral ankle edema, no  clubbing or cyanosis to extremities   Psychiatric: Appropriate affect, cooperative   Neurologic: Oriented x 3, strength symmetric in all extremities, Cranial Nerves grossly intact to confrontation, speech clear   Skin: No rashes     Result Review    Result Review:  I have personally reviewed the results from the time of this admission to 3/17/2025 17:07 EDT and agree with these findings:  []  Laboratory  []  Microbiology  [x]  Radiology  []  EKG/Telemetry   []  Cardiology/Vascular   []  Pathology  [x]  Old records  [x]  Other: Medications      Assessment & Plan   Assessment / Plan     Assessment:  DJD of bilateral hips.  Status post left total hip arthroplasty March 17.  CARLOS on home CPAP  Hypertension.  Fibromyalgia on Neurontin.  ILD not on oxygen.  Follows with pulmonary.  Chronic diastolic CHF on trial of Jardiance by her cardiologist.  Anxiety and depression.  History of breast cancer.  [place].  RBBB.    Plan:   Continue supplemental oxygen keep sats more than 90%.  Advised patient  to bring home CPAP for use with naps or as needed and at night.  IV and oral narcotics along with as needed Toradol for pain control.  IV fluid as per Ortho.  Prophylactic antibiotic as per Ortho.  Appreciate Ortho input  Bowel regimen.  Resume home medication including citalopram, Jardiance, Neurontin and Ditropan.  PT OT.  Routine blood work in the morning  Likely discharge home in a.m. if remains stable         VTE Prophylaxis:  Pharmacologic & mechanical VTE prophylaxis orders are present.        CODE STATUS:         Admission Status:  I believe this patient meets observation status.    Part of this note may be an electronic transcription/translation of spoken language to printed text using the Dragon Dictation System.     Electronically signed by Catalino Cotton MD, 03/17/25, 5:07 PM EDT.

## 2025-03-18 VITALS
HEART RATE: 70 BPM | RESPIRATION RATE: 18 BRPM | SYSTOLIC BLOOD PRESSURE: 113 MMHG | BODY MASS INDEX: 27.92 KG/M2 | DIASTOLIC BLOOD PRESSURE: 59 MMHG | OXYGEN SATURATION: 90 % | HEIGHT: 67 IN | TEMPERATURE: 98.2 F | WEIGHT: 177.91 LBS

## 2025-03-18 LAB
ANION GAP SERPL CALCULATED.3IONS-SCNC: 10.2 MMOL/L (ref 5–15)
ANION GAP SERPL CALCULATED.3IONS-SCNC: 5.7 MMOL/L (ref 5–15)
BUN SERPL-MCNC: 18 MG/DL (ref 8–23)
BUN SERPL-MCNC: 21 MG/DL (ref 8–23)
BUN/CREAT SERPL: 23.3 (ref 7–25)
BUN/CREAT SERPL: 23.4 (ref 7–25)
CALCIUM SPEC-SCNC: 8.6 MG/DL (ref 8.6–10.5)
CALCIUM SPEC-SCNC: 8.7 MG/DL (ref 8.6–10.5)
CHLORIDE SERPL-SCNC: 104 MMOL/L (ref 98–107)
CHLORIDE SERPL-SCNC: 105 MMOL/L (ref 98–107)
CO2 SERPL-SCNC: 19.8 MMOL/L (ref 22–29)
CO2 SERPL-SCNC: 25.3 MMOL/L (ref 22–29)
CREAT SERPL-MCNC: 0.77 MG/DL (ref 0.57–1)
CREAT SERPL-MCNC: 0.9 MG/DL (ref 0.57–1)
EGFRCR SERPLBLD CKD-EPI 2021: 65.2 ML/MIN/1.73
EGFRCR SERPLBLD CKD-EPI 2021: 78.6 ML/MIN/1.73
GLUCOSE SERPL-MCNC: 122 MG/DL (ref 65–99)
GLUCOSE SERPL-MCNC: 97 MG/DL (ref 65–99)
HCT VFR BLD AUTO: 43.6 % (ref 34–46.6)
HGB BLD-MCNC: 14.1 G/DL (ref 12–15.9)
POTASSIUM SERPL-SCNC: 4.5 MMOL/L (ref 3.5–5.2)
POTASSIUM SERPL-SCNC: 5.5 MMOL/L (ref 3.5–5.2)
SODIUM SERPL-SCNC: 134 MMOL/L (ref 136–145)
SODIUM SERPL-SCNC: 136 MMOL/L (ref 136–145)

## 2025-03-18 PROCEDURE — 63710000001 FERROUS SULFATE 325 (65 FE) MG TABLET: Performed by: ORTHOPAEDIC SURGERY

## 2025-03-18 PROCEDURE — 85018 HEMOGLOBIN: CPT | Performed by: ORTHOPAEDIC SURGERY

## 2025-03-18 PROCEDURE — 63710000001 CITALOPRAM 20 MG TABLET: Performed by: INTERNAL MEDICINE

## 2025-03-18 PROCEDURE — 97150 GROUP THERAPEUTIC PROCEDURES: CPT

## 2025-03-18 PROCEDURE — 63710000001 FAMOTIDINE 20 MG TABLET: Performed by: ORTHOPAEDIC SURGERY

## 2025-03-18 PROCEDURE — 99214 OFFICE O/P EST MOD 30 MIN: CPT | Performed by: INTERNAL MEDICINE

## 2025-03-18 PROCEDURE — 25010000002 KETOROLAC TROMETHAMINE PER 15 MG: Performed by: ORTHOPAEDIC SURGERY

## 2025-03-18 PROCEDURE — 63710000001 EMPAGLIFLOZIN 10 MG TABLET: Performed by: INTERNAL MEDICINE

## 2025-03-18 PROCEDURE — A9270 NON-COVERED ITEM OR SERVICE: HCPCS | Performed by: INTERNAL MEDICINE

## 2025-03-18 PROCEDURE — 63710000001 OXYBUTYNIN 5 MG TABLET: Performed by: INTERNAL MEDICINE

## 2025-03-18 PROCEDURE — 85014 HEMATOCRIT: CPT | Performed by: ORTHOPAEDIC SURGERY

## 2025-03-18 PROCEDURE — 25010000002 ENOXAPARIN PER 10 MG: Performed by: ORTHOPAEDIC SURGERY

## 2025-03-18 PROCEDURE — A9270 NON-COVERED ITEM OR SERVICE: HCPCS | Performed by: ORTHOPAEDIC SURGERY

## 2025-03-18 PROCEDURE — 80048 BASIC METABOLIC PNL TOTAL CA: CPT | Performed by: ORTHOPAEDIC SURGERY

## 2025-03-18 PROCEDURE — 80048 BASIC METABOLIC PNL TOTAL CA: CPT | Performed by: INTERNAL MEDICINE

## 2025-03-18 PROCEDURE — 63710000001 SENNOSIDES-DOCUSATE 8.6-50 MG TABLET: Performed by: ORTHOPAEDIC SURGERY

## 2025-03-18 PROCEDURE — 97535 SELF CARE MNGMENT TRAINING: CPT

## 2025-03-18 PROCEDURE — 63710000001 HYDROCODONE-ACETAMINOPHEN 7.5-325 MG TABLET: Performed by: ORTHOPAEDIC SURGERY

## 2025-03-18 PROCEDURE — 97116 GAIT TRAINING THERAPY: CPT

## 2025-03-18 PROCEDURE — 97165 OT EVAL LOW COMPLEX 30 MIN: CPT

## 2025-03-18 RX ORDER — HYDROCODONE BITARTRATE AND ACETAMINOPHEN 7.5; 325 MG/1; MG/1
1-2 TABLET ORAL EVERY 4 HOURS PRN
Qty: 40 TABLET | Refills: 0 | Status: SHIPPED | OUTPATIENT
Start: 2025-03-18

## 2025-03-18 RX ADMIN — FAMOTIDINE 40 MG: 20 TABLET, FILM COATED ORAL at 08:20

## 2025-03-18 RX ADMIN — KETOROLAC TROMETHAMINE 15 MG: 15 INJECTION, SOLUTION INTRAMUSCULAR; INTRAVENOUS at 03:24

## 2025-03-18 RX ADMIN — CITALOPRAM HYDROBROMIDE 10 MG: 20 TABLET ORAL at 08:20

## 2025-03-18 RX ADMIN — OXYBUTYNIN CHLORIDE 5 MG: 5 TABLET ORAL at 08:21

## 2025-03-18 RX ADMIN — Medication 10 ML: at 08:21

## 2025-03-18 RX ADMIN — HYDROCODONE BITARTRATE AND ACETAMINOPHEN 1 TABLET: 7.5; 325 TABLET ORAL at 14:33

## 2025-03-18 RX ADMIN — SENNOSIDES AND DOCUSATE SODIUM 2 TABLET: 50; 8.6 TABLET ORAL at 08:20

## 2025-03-18 RX ADMIN — KETOROLAC TROMETHAMINE 15 MG: 15 INJECTION, SOLUTION INTRAMUSCULAR; INTRAVENOUS at 08:21

## 2025-03-18 RX ADMIN — FERROUS SULFATE TAB 325 MG (65 MG ELEMENTAL FE) 325 MG: 325 (65 FE) TAB at 08:20

## 2025-03-18 RX ADMIN — EMPAGLIFLOZIN 10 MG: 10 TABLET, FILM COATED ORAL at 08:20

## 2025-03-18 RX ADMIN — ENOXAPARIN SODIUM 40 MG: 100 INJECTION SUBCUTANEOUS at 08:21

## 2025-03-18 RX ADMIN — HYDROCODONE BITARTRATE AND ACETAMINOPHEN 1 TABLET: 7.5; 325 TABLET ORAL at 08:20

## 2025-03-18 NOTE — SIGNIFICANT NOTE
03/18/25 0742   Plan   Final Discharge Disposition Code 06 - home with home health care   Final Note Home with [facility].

## 2025-03-18 NOTE — PLAN OF CARE
Goal Outcome Evaluation:  Plan of Care Reviewed With: patient        Progress: no change  Outcome Evaluation: pt. is aao and calls for assistance.  pt. is a one person assist with walker and has ambulated 75 feet this shift.  voiding without difficulty.  pt. has been medicated with scheduled pain medication with relief noted.  upon discharge pt. is going home with home health coming in.

## 2025-03-18 NOTE — THERAPY EVALUATION
Patient Name: Marci Dudley  : 1945    MRN: [id number]                              Today's Date: 3/18/2025       Admit Date: 3/17/2025    Visit Dx:     ICD-10-CM ICD-9-CM   1. Difficulty in walking  R26.2 719.7   2. Osteoarthritis of both hips, unspecified osteoarthritis type  M16.0 715.95   3. Impaired mobility and ADLs  Z74.09 V49.89    Z78.9      Patient Active Problem List   Diagnosis    Hydronephrosis of right kidney    Adrenal nodule    Bilateral lumbar radiculopathy    Breast cancer    Essential hypertension    Gastroesophageal reflux disease without esophagitis    Neuropathy    OAB (overactive bladder)    CARLOS (obstructive sleep apnea)    Psoriasis    Pulmonary fibrosis    Age-related osteoporosis without current pathological fracture    Polycythemia    Vitamin B12 deficiency    Vitamin D deficiency    Recurrent major depression in partial remission    Medicare annual wellness visit, subsequent    Family history of colon cancer in mother    Bilateral leg pain    Uncontrolled daytime somnolence    Osteoarthritis of both hips    OA (osteoarthritis) of hip    Impaired fasting glucose     Past Medical History:   Diagnosis Date    Abnormal Pap smear of cervix     Anxiety     Arthritis     Bilateral lumbar radiculopathy     Breast cancer     NO STICK/BP ON RIGHT ARM    Cancer 2005    Breast returned  had Both Breast removed. Radiation 2005 cancer pill -     Chest pain     on and off    Cholelithiasis 1971    Removed in     Chronic cough     Chronic kidney disease     follows with    Colon polyp     Tested removed no cancer    Depression     Treated with citalopram hydrobromide 10mg    Difficulty walking     Pain from feet , hips      Diverticulosis 2009    Flare-up in  went to emergency 00    Esophageal dysphagia     swallowing dry food    Falls     R ankle unstable    Fibromyalgia, primary     My cancer doctor Dr Srivastava put me on Gabapentin 300mg    GERD  (gastroesophageal reflux disease)     controled with diet    H/O Kidney infection     HISTORY OF MULTIPLE    Hiatal hernia     History of gangrene     AS A CHILD    History of kidney stones     History of transfusion     AS A CHILD    HL (hearing loss) 2020    Wear hearing aids from [employer]    Hypertension     Incontinence     Kidney stone 2009    No problem till 2023    Low back pain     Lymphedema     RIGHT ARM    Movement disorder 2023    Gets worse legs do not work well    Neuropathy     Nonsenile cataract     OAB (overactive bladder)     Osteopenia     Pulmonary fibrosis     follows with Dr. Powell    Renal insufficiency 1971    Surgery on blatter R kidney was full of scare tissues    Right ankle instability     right ankle and leg instabilty    Sleep apnea     CPAP    SOB (shortness of breath)     Urinary incontinence 2001    Need #6 pads 2 or 3 times daily.    Visual impairment 1990    Wear glasses have skin from eye lid that hangs over pupils     Past Surgical History:   Procedure Laterality Date    APPENDECTOMY      BLADDER SURGERY  2024    Surgery Jan, March & 2times April 2&3    BREAST LUMPECTOMY Right 2005    CARDIAC CATHETERIZATION      CHOLECYSTECTOMY      COLONOSCOPY  2018    Saw doctor Aug. 2024, refered me to specialists due to a problem.    CYSTOSCOPY URETEROSCOPY LASER LITHOTRIPSY Right 04/02/2024    Procedure: RIGHT  URETEROSCOPY  STONE BASKET EXTRACTION AND RIGHT STENT removal;  Surgeon: Jl Bergeron MD;  Location: [facility] MAIN OR;  Service: Urology;  Laterality: Right;    CYSTOSCOPY W/ URETERAL STENT PLACEMENT      CYSTOSCOPY W/ URETERAL STENT PLACEMENT Right 04/03/2024    Procedure: CYSTOSCOPY, RIGHT RETROGRADE,  URETERAL CATHETER/STENT INSERTION;  Surgeon: Tiffanie Zhu MD;  Location: [facility] MAIN OR;  Service: Urology;  Laterality: Right;    ENDOSCOPY      egd & dilitation    FRACTURE SURGERY  1953    Compound broken L arm.    HEMORRHOIDECTOMY      HERNIA REPAIR  1980?     RECTAL hernia    HIP SURGERY Left 2012    BONE SPUR    KIDNEY STONE SURGERY  2024    Listed above Jan, March & April    KIDNEY SURGERY      MASTECTOMY Bilateral 2012    RECTOCELE REPAIR      URETEROSCOPY LASER LITHOTRIPSY WITH STENT INSERTION Right 03/05/2024    Procedure: RIGHT URETEROSCOPY LASER LITHOTRIPSY, STONE BASKET EXTRACTION AND RIGHT STENT EXCHANGE;  Surgeon: Jl Bergeron MD;  Location: [facility] MAIN OR;  Service: Urology;  Laterality: Right;      General Information       Row Name 03/18/25 0832 03/18/25 0817       OT Time and Intention    Document Type therapy note (daily note)  -AC evaluation  -AC    Mode of Treatment individual therapy;occupational therapy  -AC individual therapy;occupational therapy  -AC    Patient Effort good  -AC good  -AC      Row Name 03/18/25 0832 03/18/25 0817       General Information    Patient Profile Reviewed -- yes  -AC    Prior Level of Function -- --  Patient reports independent with ADLs as well as functional mobility but states she was furniture walking.  She also states she has all equipment, rolling walker, bedside commode, cane.  She has a walk-in shower with seat option.  -AC    Existing Precautions/Restrictions fall;weight bearing  -AC fall;weight bearing  -AC    Barriers to Rehab -- none identified  -AC      Row Name 03/18/25 0817          Occupational Profile    Reason for Services/Referral (Occupational Profile) Pt. is a 79year old female admitted for the above diagnosis status post left total hip replacement on 3/17/2025. Pt. referred to OT services to assess independence with ADLs and adl transfers/fx'l mobility. No previous OT services for current condition.  -AC       Row Name 03/18/25 0817          Living Environment    Current Living Arrangements home  -AC     People in Home spouse  -AC       Row Name 03/18/25 0817          Home Main Entrance    Number of Stairs, Main Entrance one  -AC       Row Name 03/18/25 0832 03/18/25 0817       Cognition     Orientation Status (Cognition) oriented x 3  -AC oriented x 3  -AC      Row Name 03/18/25 0832 03/18/25 0817       Safety Issues/Impairments Affecting Functional Mobility    Impairments Affecting Function (Mobility) balance;endurance/activity tolerance;pain  -AC balance;endurance/activity tolerance;pain  -AC              User Key  (r) = Recorded By, (t) = Taken By, (c) = Cosigned By      Initials Name Provider Type    AC Lauren Parsons OT Occupational Therapist                     Mobility/ADL's       Row Name 03/18/25 0832 03/18/25 0818       Bed Mobility    Comment, (Bed Mobility) Patient was seated in recliner upon OT arrival in the room.  -AC Patient was seated in recliner upon OT arrival in the room.  Patient agreeable to OT.  -AC      Row Name 03/18/25 0832 03/18/25 0818       Transfers    Transfers sit-stand transfer  -AC sit-stand transfer  -AC      Row Name 03/18/25 0832 03/18/25 0818       Sit-Stand Transfer    Sit-Stand [place] (Transfers) contact guard;verbal cues  -AC contact guard  -AC    Assistive Device (Sit-Stand Transfers) walker, front-wheeled  -AC walker, front-wheeled  -AC    Comment, (Sit-Stand Transfer) Patient was contact-guard assist for sit to/from stand x 3 with rolling walker and cues for safety and technique.  -AC --      Row Name 03/18/25 0832 03/18/25 0818       Functional Mobility    Functional Mobility- Ind. Level contact guard assist;verbal cues required  -AC contact guard assist  -AC    Functional Mobility- Device walker, front-wheeled  -AC walker, front-wheeled  -AC    Functional Mobility- Comment Patient was contact-guard assist with rolling walker for recliner to bathroom, to sink and back to recliner with cues for safety and technique.  -AC --      Row Name 03/18/25 0832 03/18/25 0818       Activities of Daily Living    BADL Assessment/Intervention upper body dressing;lower body dressing;toileting;grooming  -AC --  Patient reports set up for self-feeding, set up for  grooming, set up for upper body bathing/dressing, min assist for lower body bathing/dressing, contact-guard assist for toileting.  -      Row Name 03/18/25 0832 03/18/25 0818       Mobility    Extremity Weight-bearing Status left lower extremity  - left lower extremity  -    Left Lower Extremity (Weight-bearing Status) weight-bearing as tolerated (WBAT)  - weight-bearing as tolerated (WBAT)  -      Row Name 03/18/25 0832          Upper Body Dressing Assessment/Training    [place] Level (Upper Body Dressing) upper body dressing skills;don;doff;pull-over garment;set up  -       Row Name 03/18/25 0832          Lower Body Dressing Assessment/Training    [place] Level (Lower Body Dressing) lower body dressing skills;doff;don;pants/bottoms;minimum assist (75% patient effort);verbal cues  -     Position (Lower Body Dressing) supported standing;unsupported sitting  -       Row Name 03/18/25 0832          Toileting Assessment/Training    [place] Level (Toileting) toileting skills;adjust/manage clothing;perform perineal hygiene;verbal cues;contact guard assist  -AC     Position (Toileting) unsupported sitting;supported standing  -       Row Name 03/18/25 0832          Grooming Assessment/Training    [place] Level (Grooming) grooming skills;oral care regimen;verbal cues;contact guard assist  -AC     Position (Grooming) supported standing;sink side  -               User Key  (r) = Recorded By, (t) = Taken By, (c) = Cosigned By      Initials Name Provider Type     Lauren Parsons OT Occupational Therapist                   Obj/Interventions       Row Name 03/18/25 0839 03/18/25 0821       Sensory Assessment (Somatosensory)    Sensory Assessment (Somatosensory) UE sensation intact  - UE sensation intact  -      Row Name 03/18/25 0839 03/18/25 0821       Vision Assessment/Intervention    Visual Impairment/Limitations WFL;corrective lenses full-time  - WFL;corrective lenses full-time   -AC      Row Name 03/18/25 0839 03/18/25 0821       Range of Motion Comprehensive    General Range of Motion bilateral upper extremity ROM WNL  -AC bilateral upper extremity ROM WNL  -AC      Row Name 03/18/25 0839 03/18/25 0821       Strength Comprehensive (MMT)    General Manual Muscle Testing (MMT) Assessment no strength deficits identified  -AC no strength deficits identified  -AC      Row Name 03/18/25 0839 03/18/25 0821       Motor Skills    Motor Skills coordination;functional endurance  -AC coordination;functional endurance  -AC    Coordination WFL  -AC WFL  -AC    Functional Endurance f/f+  -AC fair/fair plus for adls  -AC      Row Name 03/18/25 0839 03/18/25 0821       Balance    Balance Assessment standing dynamic balance  -AC standing dynamic balance  -AC    Dynamic Standing Balance contact guard;verbal cues;1-person assist  -AC contact guard  -AC    Position/Device Used, Standing Balance supported;walker, front-wheeled  -AC supported;walker, front-wheeled  -AC    Balance Interventions standing;sit to stand;supported;dynamic;minimal challenge;occupation based/functional task  -AC --              User Key  (r) = Recorded By, (t) = Taken By, (c) = Cosigned By      Initials Name Provider Type    AC Lauren Parsons OT Occupational Therapist                   Goals/Plan       Row Name 03/18/25 0826          Bed Mobility Goal 1 (OT)    Activity/Assistive Device (Bed Mobility Goal 1, OT) bed mobility activities, all  -AC     [place] Level/Cues Needed (Bed Mobility Goal 1, OT) modified independence  -AC     Time Frame (Bed Mobility Goal 1, OT) long term goal (LTG);10 days  -AC       Row Name 03/18/25 0826          Transfer Goal 1 (OT)    Activity/Assistive Device (Transfer Goal 1, OT) transfers, all  -AC     [place] Level/Cues Needed (Transfer Goal 1, OT) modified independence  -AC     Time Frame (Transfer Goal 1, OT) long term goal (LTG);10 days  -AC       Row Name 03/18/25 0826          Bathing Goal 1  (OT)    Activity/Device (Bathing Goal 1, OT) bathing skills, all  -AC     [place] Level/Cues Needed (Bathing Goal 1, OT) modified independence  -AC     Time Frame (Bathing Goal 1, OT) long term goal (LTG);10 days  -AC       Row Name 03/18/25 0826          Dressing Goal 1 (OT)    Activity/Device (Dressing Goal 1, OT) dressing skills, all  -AC     [place]/Cues Needed (Dressing Goal 1, OT) modified independence  -AC     Time Frame (Dressing Goal 1, OT) long term goal (LTG);10 days  -AC       Row Name 03/18/25 0826          Toileting Goal 1 (OT)    Activity/Device (Toileting Goal 1, OT) toileting skills, all  -AC     [place] Level/Cues Needed (Toileting Goal 1, OT) modified independence  -AC     Time Frame (Toileting Goal 1, OT) long term goal (LTG);10 days  -AC       Row Name 03/18/25 0826          Grooming Goal 1 (OT)    Activity/Device (Grooming Goal 1, OT) grooming skills, all  -AC     [place] (Grooming Goal 1, OT) modified independence  -AC     Time Frame (Grooming Goal 1, OT) long term goal (LTG);10 days  -AC       Row Name 03/18/25 0826          Problem Specific Goal 1 (OT)    Problem Specific Goal 1 (OT) Patient will demonstrate good activity tolerance for safe and independent ADLs.  -AC     Time Frame (Problem Specific Goal 1, OT) long term goal (LTG);10 days  -AC       Row Name 03/18/25 0826          Therapy Assessment/Plan (OT)    Planned Therapy Interventions (OT) activity tolerance training;functional balance retraining;occupation/activity based interventions;ROM/therapeutic exercise;transfer/mobility retraining;patient/caregiver education/training;BADL retraining  -AC               User Key  (r) = Recorded By, (t) = Taken By, (c) = Cosigned By      Initials Name Provider Type    AC Lauren Parsons OT Occupational Therapist                   Clinical Impression       Row Name 03/18/25 0840 03/18/25 0825       Pain Assessment    Pretreatment Pain Rating 1/10  -AC 1/10  -AC    Posttreatment  Pain Rating 1/10  -AC 1/10  -AC    Pain Location hip  -AC hip  -AC    Pain Side/Orientation left  -AC left  -AC      Row Name 03/18/25 0840 03/18/25 0825       Plan of Care Review    Plan of Care Reviewed With patient  -AC patient  -AC    Progress improving  -AC no change  -AC    Outcome Evaluation Patient instructed in lower body adaptive dressing technique, weightbearing status, joint protection and safety with adl transfers. patient to continue with therapy in order to maximize independence with adls.  -AC Patient presents with balance and endurance limitations that impede his/her ability to perform ADLS. The skills of a therapist are necessary to maximize independence with ADLs.  -AC      Row Name 03/18/25 0825          Therapy Assessment/Plan (OT)    Patient/Family Therapy Goal Statement (OT) Less pain.  -AC     Rehab Potential (OT) good  -AC     Criteria for Skilled Therapeutic Interventions Met (OT) yes;meets criteria;skilled treatment is necessary  -AC     Therapy Frequency (OT) 5 times/wk  -AC       Row Name 03/18/25 0825          Therapy Plan Review/Discharge Plan (OT)    Equipment Needs Upon Discharge (OT) walker, rolling;commode chair  -AC     Anticipated Discharge Disposition (OT) home with assist;home with home health  -       Row Name 03/18/25 0825          Positioning and Restraints    Pre-Treatment Position sitting in chair/recliner  -AC     Post Treatment Position chair  -AC     In Chair call light within reach;encouraged to call for assist;exit alarm on;reclined;legs elevated  -AC               User Key  (r) = Recorded By, (t) = Taken By, (c) = Cosigned By      Initials Name Provider Type    AC Lauren Parsons, OT Occupational Therapist                   Outcome Measures       Row Name 03/18/25 0888          How much help from another is currently needed...    Putting on and taking off regular lower body clothing? 3  -AC     Bathing (including washing, rinsing, and drying) 3  -AC     Toileting  (which includes using toilet bed pan or urinal) 3  -AC     Putting on and taking off regular upper body clothing 4  -AC     Taking care of personal grooming (such as brushing teeth) 4  -AC     Eating meals 4  -AC     AM-PAC 6 Clicks Score (OT) 21  -AC       Row Name 03/18/25 0827          Functional Assessment    Outcome Measure Options AM-PAC 6 Clicks Daily Activity (OT);Optimal Instrument  -AC       Row Name 03/18/25 0827          Optimal Instrument    Optimal Instrument Optimal - 3  -AC     Bending/Stooping 2  -AC     Standing 2  -AC     Reaching 1  -AC     From the list, choose the 3 activities you would most like to be able to do without any difficulty Bending/stooping;Standing;Reaching  -AC     Total Score Optimal - 3 5  -AC               User Key  (r) = Recorded By, (t) = Taken By, (c) = Cosigned By      Initials Name Provider Type    AC Lauren Prasons OT Occupational Therapist                    Occupational Therapy Education        No education to display              User Key       Initials Effective Dates Name Provider Type Discipline     06/16/21 -  Lauren Parsons OT Occupational Therapist OT                  OT Recommendation and Plan  Planned Therapy Interventions (OT): activity tolerance training, functional balance retraining, occupation/activity based interventions, ROM/therapeutic exercise, transfer/mobility retraining, patient/caregiver education/training, BADL retraining  Therapy Frequency (OT): 5 times/wk  Plan of Care Review  Plan of Care Reviewed With: patient  Progress: improving  Outcome Evaluation: Patient instructed in lower body adaptive dressing technique, weightbearing status, joint protection and safety with adl transfers. patient to continue with therapy in order to maximize independence with adls.     Time Calculation:   Evaluation Complexity (OT)  Review Occupational Profile/Medical/Therapy History Complexity: expanded/moderate complexity  Assessment, Occupational  Performance/Identification of Deficit Complexity: 1-3 performance deficits  Clinical Decision Making Complexity (OT): problem focused assessment/low complexity  Overall Complexity of Evaluation (OT): low complexity     Time Calculation- OT       Row Name 03/18/25 0828             Time Calculation- OT    OT Received On 03/18/25  -AC      OT Goal Re-Cert Due Date 03/27/25  -AC         Timed Charges    45340 - OT Self Care/Mgmt Minutes 10  -AC         Untimed Charges    OT Eval/Re-eval Minutes 32  -AC         Total Minutes    Timed Charges Total Minutes 10  -AC      Untimed Charges Total Minutes 32  -AC       Total Minutes 42  -AC                User Key  (r) = Recorded By, (t) = Taken By, (c) = Cosigned By      Initials Name Provider Type    AC Lauren Parsons OT Occupational Therapist                  Therapy Charges for Today       Code Description Service Date Service Provider Modifiers Qty    22377708741 HC OT SELF CARE/MGMT/TRAIN EA 15 MIN 3/18/2025 Lauren Parsons OT GO 1    [id number] HC OT EVAL LOW COMPLEXITY 3 3/18/2025 Lauren Parsons OT GO 1                 Lauren Parsons OT  3/18/2025

## 2025-03-18 NOTE — PLAN OF CARE
Goal Outcome Evaluation:  Plan of Care Reviewed With: patient           Outcome Evaluation: Pt is A&O X 4, on room air, and X 1 assist with walker and gait belt. All scheduled medications given as ordered and pain managed with PRN medications. Safety checks maintained throughout shift with pt resting in chair, wheels to chair locked, and personal items and call light within reach. VSS. Pt participated in physical therapy and will follow up with [facility] after discharge. Educated pt on discharge instructions to include follow up appointments, incision care/dressing changes, signs/symptoms of infection, pain management, and if any questions or concerns arise to follow up with orthopedic office. Pt verbalized understanding. Pt voiding without difficulty.

## 2025-03-18 NOTE — PLAN OF CARE
Goal Outcome Evaluation:  Plan of Care Reviewed With: patient        Progress: no change  Outcome Evaluation: Patient presents with balance and endurance limitations that impede his/her ability to perform ADLS. The skills of a therapist are necessary to maximize independence with ADLs.    Anticipated Discharge Disposition (OT): home with assist, home with home health

## 2025-03-18 NOTE — THERAPY TREATMENT NOTE
[facility] - Physical Therapy Treatment Note   [place]     Patient Name: Marci Dudley  : 1945  MRN: [id number]  Today's Date: 3/18/2025      Visit Dx:     ICD-10-CM ICD-9-CM   1. Difficulty in walking  R26.2 719.7   2. Osteoarthritis of both hips, unspecified osteoarthritis type  M16.0 715.95   3. Impaired mobility and ADLs  Z74.09 V49.89    Z78.9      Patient Active Problem List   Diagnosis    Hydronephrosis of right kidney    Adrenal nodule    Bilateral lumbar radiculopathy    Breast cancer    Essential hypertension    Gastroesophageal reflux disease without esophagitis    Neuropathy    OAB (overactive bladder)    CARLOS (obstructive sleep apnea)    Psoriasis    Pulmonary fibrosis    Age-related osteoporosis without current pathological fracture    Polycythemia    Vitamin B12 deficiency    Vitamin D deficiency    Recurrent major depression in partial remission    Medicare annual wellness visit, subsequent    Family history of colon cancer in mother    Bilateral leg pain    Uncontrolled daytime somnolence    Osteoarthritis of both hips    OA (osteoarthritis) of hip    Impaired fasting glucose     Past Medical History:   Diagnosis Date    Abnormal Pap smear of cervix     Anxiety     Arthritis     Bilateral lumbar radiculopathy     Breast cancer     NO STICK/BP ON RIGHT ARM    Cancer 2005    Breast returned  had Both Breast removed. Radiation 2005 cancer pill -     Chest pain     on and off    Cholelithiasis 1971    Removed in     Chronic cough     Chronic kidney disease     follows with    Colon polyp     Tested removed no cancer    Depression     Treated with citalopram hydrobromide 10mg    Difficulty walking     Pain from feet , hips      Diverticulosis 2009    Flare-up in  went to emergency 00    Esophageal dysphagia     swallowing dry food    Falls     R ankle unstable    Fibromyalgia, primary     My cancer doctor Dr Srivastava put me on Gabapentin 300mg     GERD (gastroesophageal reflux disease)     controled with diet    H/O Kidney infection     HISTORY OF MULTIPLE    Hiatal hernia     History of gangrene     AS A CHILD    History of kidney stones     History of transfusion     AS A CHILD    HL (hearing loss) 2020    Wear hearing aids from [employer]    Hypertension     Incontinence     Kidney stone 2009    No problem till 2023    Low back pain     Lymphedema     RIGHT ARM    Movement disorder 2023    Gets worse legs do not work well    Neuropathy     Nonsenile cataract     OAB (overactive bladder)     Osteopenia     Pulmonary fibrosis     follows with Dr. Powell    Renal insufficiency 1971    Surgery on blatter R kidney was full of scare tissues    Right ankle instability     right ankle and leg instabilty    Sleep apnea     CPAP    SOB (shortness of breath)     Urinary incontinence 2001    Need #6 pads 2 or 3 times daily.    Visual impairment 1990    Wear glasses have skin from eye lid that hangs over pupils     Past Surgical History:   Procedure Laterality Date    APPENDECTOMY      BLADDER SURGERY  2024    Surgery Jan, March & 2times April 2&3    BREAST LUMPECTOMY Right 2005    CARDIAC CATHETERIZATION      CHOLECYSTECTOMY      COLONOSCOPY  2018    Saw doctor Aug. 2024, refered me to specialists due to a problem.    CYSTOSCOPY URETEROSCOPY LASER LITHOTRIPSY Right 04/02/2024    Procedure: RIGHT  URETEROSCOPY  STONE BASKET EXTRACTION AND RIGHT STENT removal;  Surgeon: Jl Bergeron MD;  Location: [facility] MAIN OR;  Service: Urology;  Laterality: Right;    CYSTOSCOPY W/ URETERAL STENT PLACEMENT      CYSTOSCOPY W/ URETERAL STENT PLACEMENT Right 04/03/2024    Procedure: CYSTOSCOPY, RIGHT RETROGRADE,  URETERAL CATHETER/STENT INSERTION;  Surgeon: Tiffanie Zhu MD;  Location: [facility] MAIN OR;  Service: Urology;  Laterality: Right;    ENDOSCOPY      egd & dilitation    FRACTURE SURGERY  1953    Compound broken L arm.    HEMORRHOIDECTOMY      HERNIA REPAIR   1980?    RECTAL hernia    HIP SURGERY Left 2012    BONE SPUR    KIDNEY STONE SURGERY  2024    Listed above Jan, March & April    KIDNEY SURGERY      MASTECTOMY Bilateral 2012    RECTOCELE REPAIR      TOTAL HIP ARTHROPLASTY Left 3/17/2025    Procedure: LEFT TOTAL HIP ARTHROPLASTY ANTERIOR;  Surgeon: Nani Watson MD;  Location: [facility] MAIN OR;  Service: Orthopedics;  Laterality: Left;    URETEROSCOPY LASER LITHOTRIPSY WITH STENT INSERTION Right 03/05/2024    Procedure: RIGHT URETEROSCOPY LASER LITHOTRIPSY, STONE BASKET EXTRACTION AND RIGHT STENT EXCHANGE;  Surgeon: Jl Bergeron MD;  Location: [facility] MAIN OR;  Service: Urology;  Laterality: Right;     PT Assessment (Last 12 Hours)       PT Evaluation and Treatment       Row Name 03/18/25 1144          Physical Therapy Time and Intention    Subjective Information complains of;pain  -RH     Document Type therapy note (daily note)  -     Mode of Treatment individual therapy;group therapy;physical therapy  -     Patient Effort good  -     Comment Gait performed individually; therapuetic exercises performed in a group session with 5 participants  -       Row Name 03/18/25 1144          General Information    Patient Observations alert;cooperative;agree to therapy  -       Row Name 03/18/25 1144          Pain    Posttreatment Pain Rating 3/10  -     Pain Location hip  -RH     Pain Side/Orientation left  -RH     Pain Management Interventions diversional activity provided;nursing notified  -       Row Name 03/18/25 1144          Range of Motion (ROM)    Range of Motion --  Pt L hip AAROM at 90 degrees flex and 20 degrees abd.  -       Row Name 03/18/25 1144          Strength (Manual Muscle Testing)    Strength (Manual Muscle Testing) --  Pt L hip flex strength at 2/5.  -       Row Name 03/18/25 1144          Mobility    Extremity Weight-bearing Status left lower extremity  -     Left Lower Extremity (Weight-bearing Status) weight-bearing as  tolerated (WBAT)  -       Row Name 03/18/25 1144          Transfers    Transfers sit-stand transfer;stand-sit transfer  -       Row Name 03/18/25 1144          Sit-Stand Transfer    Sit-Stand [place] (Transfers) contact guard  -     Assistive Device (Sit-Stand Transfers) walker, front-wheeled  -       Row Name 03/18/25 1144          Stand-Sit Transfer    Stand-Sit [place] (Transfers) contact guard  -     Assistive Device (Stand-Sit Transfers) walker, front-wheeled  -       Row Name 03/18/25 1144          Gait/Stairs (Locomotion)    Gait/Stairs Locomotion gait/ambulation assistive device  -     [place] Level (Gait) contact guard  -     Assistive Device (Gait) walker, front-wheeled  -     Patient was able to Ambulate yes  -RH     Distance in Feet (Gait) 100  -RH     Pattern (Gait) --  Pt initially with 3 point step-to gait with gradual progression to reciprocal gait.  -     Deviations/Abnormal Patterns (Gait) gait speed decreased;stride length decreased;base of support, narrow;antalgic  -     Bilateral Gait Deviations heel strike decreased  -     Gait Assessment/Intervention Pt with frequent rest breaks due to pain.  -       Row Name 03/18/25 1144          Safety Issues/Impairments Affecting Functional Mobility    Impairments Affecting Function (Mobility) balance;pain;range of motion (ROM);strength  -       Row Name 03/18/25 1144          Balance    Balance Assessment standing dynamic balance  -     Dynamic Standing Balance contact guard  -     Position/Device Used, Standing Balance walker, front-wheeled  -       Row Name 03/18/25 1144          Motor Skills    Therapeutic Exercise hip;knee;ankle  -       Row Name 03/18/25 1144          Hip (Therapeutic Exercise)    Hip (Therapeutic Exercise) isometric exercises  -     Hip Isometrics (Therapeutic Exercise) left;gluteal sets;aBduction;10 repetitions;2 sets  -       Row Name 03/18/25 1144          Knee (Therapeutic  Exercise)    Knee (Therapeutic Exercise) strengthening exercise;isometric exercises  -     Knee Isometrics (Therapeutic Exercise) left;quad sets;10 repetitions;2 sets  -     Knee Strengthening (Therapeutic Exercise) left;heel slides;SAQ (short arc quad);LAQ (long arc quad);10 repetitions;2 sets  -       Row Name 03/18/25 1144          Ankle (Therapeutic Exercise)    Ankle (Therapeutic Exercise) AROM (active range of motion)  -     Ankle AROM (Therapeutic Exercise) left;dorsiflexion;plantarflexion;10 repetitions;2 sets  -       Row Name             Wound 03/17/25 1050 Left anterior hip Surgical Closed Surgical Incision    Wound - Properties Group Placement Date: 03/17/25  -SC Placement Time: 1050  -SC Present on Original Admission: N  -SC Side: Left  -SC Orientation: anterior  -SC Location: hip  -SC Primary Wound Type: Surgical  -SC Secondary Wound Type - Surgical: Closed Surgi  -SC    Retired Wound - Properties Group Placement Date: 03/17/25  -SC Placement Time: 1050  -SC Present on Original Admission: N  -SC Side: Left  -SC Orientation: anterior  -SC Location: hip  -SC    Retired Wound - Properties Group Placement Date: 03/17/25  -SC Placement Time: 1050  -SC Present on Original Admission: N  -SC Side: Left  -SC Orientation: anterior  -SC Location: hip  -SC    Retired Wound - Properties Group Date first assessed: 03/17/25  -SC Time first assessed: 1050  -SC Present on Original Admission: N  -SC Side: Left  -SC Location: hip  -SC      Row Name 03/18/25 1144          Positioning and Restraints    In Chair reclined;call light within reach;encouraged to call for assist;exit alarm on  -       Row Name 03/18/25 1144          Progress Summary (PT)    Progress Toward Functional Goals (PT) progress toward functional goals is good  -     Daily Progress Summary (PT) Pt is progressing well with their exercise program.  Will continue current plan of care.  -               User Key  (r) = Recorded By, (t) = Taken  By, (c) = Cosigned By      Initials Name Provider Type    SC Kim Sandoval, RN Registered Nurse    RH Alec Rg PTA Physical Therapist Assistant                    Physical Therapy Education       Title: PT OT SLP Therapies (Done)       Topic: Physical Therapy (Done)       Point: Mobility training (Done)       Learning Progress Summary            Patient Acceptance, E,TB,D, VU,DU by  at 3/18/2025 0828    Acceptance, E,TB, VU by TM at 3/17/2025 1347                      Point: Home exercise program (Done)       Learning Progress Summary            Patient Acceptance, E,TB,D, VU,DU by  at 3/18/2025 0828    Acceptance, E,TB, VU by TM at 3/17/2025 1347                      Point: Body mechanics (Done)       Learning Progress Summary            Patient Acceptance, E,TB,D, VU,DU by  at 3/18/2025 0828    Acceptance, E,TB, VU by TM at 3/17/2025 1347                      Point: Precautions (Done)       Learning Progress Summary            Patient Acceptance, E,TB,D, VU,DU by  at 3/18/2025 0828    Acceptance, E,TB, VU by  at 3/17/2025 1347                                      User Key       Initials Effective Dates Name Provider Type Discipline     06/16/21 -  Lauren Parsons OT Occupational Therapist OT     02/04/25 -  Freeman Turner PT Student PT Student PT                  PT Recommendation and Plan     Progress Summary (PT)  Progress Toward Functional Goals (PT): progress toward functional goals is good  Daily Progress Summary (PT): Pt is progressing well with their exercise program.  Will continue current plan of care.   Outcome Measures       Row Name 03/18/25 1100 03/17/25 1300          How much help from another person do you currently need...    Turning from your back to your side while in flat bed without using bedrails? 4  -RH 4  -ROWDY (r) TM (t) ROWDY (c)     Moving from lying on back to sitting on the side of a flat bed without bedrails? 3  -RH 2  -ROWDY (r) TM (t) ROWDY (c)     Moving to and from a  bed to a chair (including a wheelchair)? 3  -RH 3  -ROWDY (r) TM (t) ROWDY (c)     Standing up from a chair using your arms (e.g., wheelchair, bedside chair)? 3  -RH 3  -ROWDY (r) TM (t) ROWDY (c)     Climbing 3-5 steps with a railing? 3  -RH 3  -ROWDY (r) TM (t) ROWDY (c)     To walk in hospital room? 4  -RH 3  -ROWDY (r) TM (t) ROWDY (c)     AM-PAC 6 Clicks Score (PT) 20  -RH 18  -ROWDY (r) TM (t)        Functional Assessment    Outcome Measure Options -- AM-PAC 6 Clicks Basic Mobility (PT)  -ROWDY (r) TM (t) ROWDY (c)               User Key  (r) = Recorded By, (t) = Taken By, (c) = Cosigned By      Initials Name Provider Type     Alec Rg PTA Physical Therapist Assistant    Andrea Zapata, PT Physical Therapist    Freeman Rizzo, PT Student PT Student                     Time Calculation:    PT Charges       Row Name 03/18/25 1142             Time Calculation    PT Received On 03/18/25  -RH         Timed Charges    38083 - Gait Training Minutes  9  -RH      27880 - PT Therapeutic Activity Minutes 5  -RH         Untimed Charges    PT Group Therapy Minutes 30  -RH         Total Minutes    Timed Charges Total Minutes 14  -RH      Untimed Charges Total Minutes 30  -RH       Total Minutes 44  -RH                User Key  (r) = Recorded By, (t) = Taken By, (c) = Cosigned By      Initials Name Provider Type     Alec Rg PTA Physical Therapist Assistant                  Therapy Charges for Today       Code Description Service Date Service Provider Modifiers Qty    72360357861 HC GAIT TRAINING EA 15 MIN 3/18/2025 Alec Rg PTA GP 1    73271781617 HC PT THER PROC GROUP 3/18/2025 Alec Rg PTA GP 1            PT G-Codes  Outcome Measure Options: AM-PAC 6 Clicks Daily Activity (OT), Optimal Instrument  AM-PAC 6 Clicks Score (PT): 20  AM-PAC 6 Clicks Score (OT): 21    Alec Rg PTA  3/18/2025

## 2025-03-18 NOTE — PLAN OF CARE
Goal Outcome Evaluation:  Plan of Care Reviewed With: patient        Progress: no change  Outcome Evaluation: pt. is aao and calls for assistance.  pt. is a one person assist with walker to ambulate and has ambulated 75 feet this shift.  pt. has been medicated with scheduled pain medication with relief noted.  voiding without difficulty.  upon discharge pt. is going home with outpt therapy.

## 2025-03-18 NOTE — THERAPY TREATMENT NOTE
Acute Care - Physical Therapy Treatment Note   [place]     Patient Name: Marci Dudley  : 1945  MRN: [id number]  Today's Date: 3/18/2025      Visit Dx:     ICD-10-CM ICD-9-CM   1. Difficulty in walking  R26.2 719.7   2. Osteoarthritis of both hips, unspecified osteoarthritis type  M16.0 715.95   3. Impaired mobility and ADLs  Z74.09 V49.89    Z78.9      Patient Active Problem List   Diagnosis    Hydronephrosis of right kidney    Adrenal nodule    Bilateral lumbar radiculopathy    Breast cancer    Essential hypertension    Gastroesophageal reflux disease without esophagitis    Neuropathy    OAB (overactive bladder)    CARLOS (obstructive sleep apnea)    Psoriasis    Pulmonary fibrosis    Age-related osteoporosis without current pathological fracture    Polycythemia    Vitamin B12 deficiency    Vitamin D deficiency    Recurrent major depression in partial remission    Medicare annual wellness visit, subsequent    Family history of colon cancer in mother    Bilateral leg pain    Uncontrolled daytime somnolence    Osteoarthritis of both hips    OA (osteoarthritis) of hip    Impaired fasting glucose     Past Medical History:   Diagnosis Date    Abnormal Pap smear of cervix     Anxiety     Arthritis     Bilateral lumbar radiculopathy     Breast cancer     NO STICK/BP ON RIGHT ARM    Cancer 2005    Breast returned  had Both Breast removed. Radiation 2005 cancer pill -     Chest pain     on and off    Cholelithiasis 1971    Removed in     Chronic cough     Chronic kidney disease     follows with    Colon polyp     Tested removed no cancer    Depression     Treated with citalopram hydrobromide 10mg    Difficulty walking     Pain from feet , hips      Diverticulosis 2009    Flare-up in  went to emergency 00    Esophageal dysphagia     swallowing dry food    Falls     R ankle unstable    Fibromyalgia, primary     My cancer doctor Dr Srivastava put me on Gabapentin 300mg    GERD  (gastroesophageal reflux disease)     controled with diet    H/O Kidney infection     HISTORY OF MULTIPLE    Hiatal hernia     History of gangrene     AS A CHILD    History of kidney stones     History of transfusion     AS A CHILD    HL (hearing loss) 2020    Wear hearing aids from [employer]    Hypertension     Incontinence     Kidney stone 2009    No problem till 2023    Low back pain     Lymphedema     RIGHT ARM    Movement disorder 2023    Gets worse legs do not work well    Neuropathy     Nonsenile cataract     OAB (overactive bladder)     Osteopenia     Pulmonary fibrosis     follows with Dr. Powell    Renal insufficiency 1971    Surgery on blatter R kidney was full of scare tissues    Right ankle instability     right ankle and leg instabilty    Sleep apnea     CPAP    SOB (shortness of breath)     Urinary incontinence 2001    Need #6 pads 2 or 3 times daily.    Visual impairment 1990    Wear glasses have skin from eye lid that hangs over pupils     Past Surgical History:   Procedure Laterality Date    APPENDECTOMY      BLADDER SURGERY  2024    Surgery Jan, March & 2times April 2&3    BREAST LUMPECTOMY Right 2005    CARDIAC CATHETERIZATION      CHOLECYSTECTOMY      COLONOSCOPY  2018    Saw doctor Aug. 2024, refered me to specialists due to a problem.    CYSTOSCOPY URETEROSCOPY LASER LITHOTRIPSY Right 04/02/2024    Procedure: RIGHT  URETEROSCOPY  STONE BASKET EXTRACTION AND RIGHT STENT removal;  Surgeon: Jl Bergeron MD;  Location: [facility] MAIN OR;  Service: Urology;  Laterality: Right;    CYSTOSCOPY W/ URETERAL STENT PLACEMENT      CYSTOSCOPY W/ URETERAL STENT PLACEMENT Right 04/03/2024    Procedure: CYSTOSCOPY, RIGHT RETROGRADE,  URETERAL CATHETER/STENT INSERTION;  Surgeon: Tiffanie Zhu MD;  Location: [facility] MAIN OR;  Service: Urology;  Laterality: Right;    ENDOSCOPY      egd & dilitation    FRACTURE SURGERY  1953    Compound broken L arm.    HEMORRHOIDECTOMY      HERNIA REPAIR  1980?     RECTAL hernia    HIP SURGERY Left 2012    BONE SPUR    KIDNEY STONE SURGERY  2024    Listed above Jan, March & April    KIDNEY SURGERY      MASTECTOMY Bilateral 2012    RECTOCELE REPAIR      TOTAL HIP ARTHROPLASTY Left 3/17/2025    Procedure: LEFT TOTAL HIP ARTHROPLASTY ANTERIOR;  Surgeon: Nani Watson MD;  Location: [facility] MAIN OR;  Service: Orthopedics;  Laterality: Left;    URETEROSCOPY LASER LITHOTRIPSY WITH STENT INSERTION Right 03/05/2024    Procedure: RIGHT URETEROSCOPY LASER LITHOTRIPSY, STONE BASKET EXTRACTION AND RIGHT STENT EXCHANGE;  Surgeon: Jl Bergeron MD;  Location: [facility] MAIN OR;  Service: Urology;  Laterality: Right;     PT Assessment (Last 12 Hours)       PT Evaluation and Treatment       Row Name 03/18/25 1447 03/18/25 1144       Physical Therapy Time and Intention    Subjective Information complains of;pain  -RH complains of;pain  -RH    Document Type therapy note (daily note)  -RH therapy note (daily note)  -    Mode of Treatment individual therapy;group therapy;physical therapy  -RH individual therapy;group therapy;physical therapy  -    Patient Effort good  -RH good  -RH    Comment Gait performed individually; therapuetic exercises performed in a group session with 4 participants  -RH Gait performed individually; therapuetic exercises performed in a group session with 5 participants  -RH      Row Name 03/18/25 1447 03/18/25 1144       General Information    Patient Observations alert;cooperative;agree to therapy  -RH alert;cooperative;agree to therapy  -RH      Row Name 03/18/25 1447 03/18/25 1144       Pain    Posttreatment Pain Rating -- 3/10  -RH    Pain Location hip  -RH hip  -RH    Pain Side/Orientation left  -RH left  -RH    Pain Management Interventions -- diversional activity provided;nursing notified  -RH    Additional Documentation Pain Scale: FACES Pre/Post-Treatment (Group)  -RH --      Row Name 03/18/25 1447          Pain Scale: FACES Pre/Post-Treatment    Pain:  FACES Scale, Pretreatment 2-->hurts little bit  -RH     Posttreatment Pain Rating 2-->hurts little bit  -RH       Row Name 03/18/25 1144          Range of Motion (ROM)    Range of Motion --  Pt L hip AAROM at 90 degrees flex and 20 degrees abd.  -       Row Name 03/18/25 1144          Strength (Manual Muscle Testing)    Strength (Manual Muscle Testing) --  Pt L hip flex strength at 2/5.  -       Row Name 03/18/25 1447 03/18/25 1144       Mobility    Extremity Weight-bearing Status left lower extremity  -RH left lower extremity  -RH    Left Lower Extremity (Weight-bearing Status) weight-bearing as tolerated (WBAT)  -RH weight-bearing as tolerated (WBAT)  -      Row Name 03/18/25 1447 03/18/25 1144       Transfers    Transfers sit-stand transfer;stand-sit transfer  - sit-stand transfer;stand-sit transfer  -      Row Name 03/18/25 1447 03/18/25 1144       Sit-Stand Transfer    Sit-Stand [place]t (Transfers) contact guard  - contact guard  -    Assistive Device (Sit-Stand Transfers) walker, front-wheeled  - walker, front-wheeled  -      Row Name 03/18/25 1447 03/18/25 1144       Stand-Sit Transfer    Stand-Sit [place]t (Transfers) contact guard  - contact guard  -    Assistive Device (Stand-Sit Transfers) walker, front-wheeled  - walker, front-wheeled  -      Row Name 03/18/25 1447 03/18/25 1144       Gait/Stairs (Locomotion)    Gait/Stairs Locomotion gait/ambulation assistive device  -RH gait/ambulation assistive device  -RH    Routt Level (Gait) contact guard  - contact guard  -    Assistive Device (Gait) walker, front-wheeled  - walker, front-wheeled  -    Patient was able to Ambulate yes  -RH yes  -RH    Distance in Feet (Gait) 140  -  -RH    Pattern (Gait) 3-point;step-through  -RH --  Pt initially with 3 point step-to gait with gradual progression to reciprocal gait.  -RH    Deviations/Abnormal Patterns (Gait) antalgic;gait speed decreased;stride length  decreased  -RH gait speed decreased;stride length decreased;base of support, narrow;antalgic  -RH    Bilateral Gait Deviations -- heel strike decreased  -RH    Left Sided Gait Deviations heel strike decreased  -RH --    Gait Assessment/Intervention -- Pt with frequent rest breaks due to pain.  -      Row Name 03/18/25 1447 03/18/25 1144       Safety Issues/Impairments Affecting Functional Mobility    Impairments Affecting Function (Mobility) balance;pain;range of motion (ROM);strength  - balance;pain;range of motion (ROM);strength  -      Row Name 03/18/25 1447 03/18/25 1144       Balance    Balance Assessment standing dynamic balance  - standing dynamic balance  -RH    Dynamic Standing Balance contact guard  -RH contact guard  -RH    Position/Device Used, Standing Balance walker, front-wheeled  - walker, front-wheeled  -      Row Name 03/18/25 1447 03/18/25 1144       Motor Skills    Therapeutic Exercise hip;knee;ankle  - hip;knee;ankle  -      Row Name 03/18/25 1447 03/18/25 1144       Hip (Therapeutic Exercise)    Hip (Therapeutic Exercise) isometric exercises  - isometric exercises  -    Hip Isometrics (Therapeutic Exercise) left;gluteal sets;aBduction;10 repetitions;2 sets  -RH left;gluteal sets;aBduction;10 repetitions;2 sets  -      Row Name 03/18/25 1447 03/18/25 1144       Knee (Therapeutic Exercise)    Knee (Therapeutic Exercise) strengthening exercise;isometric exercises  -RH strengthening exercise;isometric exercises  -RH    Knee Isometrics (Therapeutic Exercise) left;quad sets;10 repetitions;2 sets  -RH left;quad sets;10 repetitions;2 sets  -RH    Knee Strengthening (Therapeutic Exercise) left;heel slides;SAQ (short arc quad);LAQ (long arc quad);10 repetitions;2 sets  -RH left;heel slides;SAQ (short arc quad);LAQ (long arc quad);10 repetitions;2 sets  -      Row Name 03/18/25 1447 03/18/25 1144       Ankle (Therapeutic Exercise)    Ankle (Therapeutic Exercise) AROM (active range  of motion)  -RH AROM (active range of motion)  -RH    Ankle AROM (Therapeutic Exercise) left;dorsiflexion;plantarflexion;10 repetitions;2 sets  -RH left;dorsiflexion;plantarflexion;10 repetitions;2 sets  -RH      Row Name             Wound 03/17/25 1050 Left anterior hip Surgical Closed Surgical Incision    Wound - Properties Group Placement Date: 03/17/25  -SC Placement Time: 1050  -SC Present on Original Admission: N  -SC Side: Left  -SC Orientation: anterior  -SC Location: hip  -SC Primary Wound Type: Surgical  -SC Secondary Wound Type - Surgical: Closed Surgi  -SC    Retired Wound - Properties Group Placement Date: 03/17/25  -SC Placement Time: 1050  -SC Present on Original Admission: N  -SC Side: Left  -SC Orientation: anterior  -SC Location: hip  -SC    Retired Wound - Properties Group Placement Date: 03/17/25  -SC Placement Time: 1050  -SC Present on Original Admission: N  -SC Side: Left  -SC Orientation: anterior  -SC Location: hip  -SC    Retired Wound - Properties Group Date first assessed: 03/17/25  -SC Time first assessed: 1050  -SC Present on Original Admission: N  -SC Side: Left  -SC Location: hip  -SC      Row Name 03/18/25 1447 03/18/25 1144       Positioning and Restraints    In Chair reclined;call light within reach;encouraged to call for assist;exit alarm on  -RH reclined;call light within reach;encouraged to call for assist;exit alarm on  -RH      Row Name 03/18/25 1447 03/18/25 1144       Progress Summary (PT)    Progress Toward Functional Goals (PT) progress toward functional goals is good  -RH progress toward functional goals is good  -    Daily Progress Summary (PT) Pt is progressing well with their exercise program.  Will continue current plan of care.  -RH Pt is progressing well with their exercise program.  Will continue current plan of care.  -RH              User Key  (r) = Recorded By, (t) = Taken By, (c) = Cosigned By      Initials Name Provider Type    Kim Gonzalez RN  Registered Nurse    Alec Bae PTA Physical Therapist Assistant                    Physical Therapy Education       Title: PT OT SLP Therapies (Resolved)       Topic: Physical Therapy (Resolved)       Point: Mobility training (Resolved)       Learning Progress Summary            Patient Acceptance, E, VU by  at 3/18/2025 1350    Acceptance, E, VU by JW at 3/18/2025 1346    Acceptance, E,TB,D, VU,DU by  at 3/18/2025 0828    Acceptance, E,TB, VU by TM at 3/17/2025 1347                      Point: Home exercise program (Resolved)       Learning Progress Summary            Patient Acceptance, E, VU by  at 3/18/2025 1350    Acceptance, E, VU by  at 3/18/2025 1346    Acceptance, E,TB,D, VU,DU by  at 3/18/2025 0828    Acceptance, E,TB, VU by TM at 3/17/2025 1347                      Point: Body mechanics (Resolved)       Learning Progress Summary            Patient Acceptance, E, VU by  at 3/18/2025 1350    Acceptance, E, VU by  at 3/18/2025 1346    Acceptance, E,TB,D, VU,DU by  at 3/18/2025 0828    Acceptance, E,TB, VU by TM at 3/17/2025 1347                      Point: Precautions (Resolved)       Learning Progress Summary            Patient Acceptance, E, VU by  at 3/18/2025 1350    Acceptance, E, VU by  at 3/18/2025 1346    Acceptance, E,TB,D, VU,DU by  at 3/18/2025 0828    Acceptance, E,TB, VU by  at 3/17/2025 1347                                      User Key       Initials Effective Dates Name Provider Type Discipline     07/13/23 -  Imelda Franco RN Registered Nurse Nurse     06/16/21 -  Lauren Parsons OT Occupational Therapist OT    TM 02/04/25 -  Freeman Turner, [employer] Student PT Student PT                  PT Recommendation and Plan     Progress Summary (PT)  Progress Toward Functional Goals (PT): progress toward functional goals is good  Daily Progress Summary (PT): Pt is progressing well with their exercise program.  Will continue current plan of care.   Outcome Measures        Row Name 03/18/25 1100 03/17/25 1300          How much help from another person do you currently need...    Turning from your back to your side while in flat bed without using bedrails? 4  -RH 4  -ROWDY (r) TM (t) ROWDY (c)     Moving from lying on back to sitting on the side of a flat bed without bedrails? 3  -RH 2  -ROWDY (r) TM (t) ROWDY (c)     Moving to and from a bed to a chair (including a wheelchair)? 3  -RH 3  -ROWDY (r) TM (t) ROWDY (c)     Standing up from a chair using your arms (e.g., wheelchair, bedside chair)? 3  -RH 3  -ROWDY (r) TM (t) ROWDY (c)     Climbing 3-5 steps with a railing? 3  -RH 3  -ROWDY (r) TM (t) ROWDY (c)     To walk in hospital room? 4  -RH 3  -ROWDY (r) TM (t) ROWDY (c)     AM-PAC 6 Clicks Score (PT) 20  -RH 18  -ROWDY (r) TM (t)        Functional Assessment    Outcome Measure Options -- AM-PAC 6 Clicks Basic Mobility (PT)  -ROWDY (r) TM (t) ROWDY (c)               User Key  (r) = Recorded By, (t) = Taken By, (c) = Cosigned By      Initials Name Provider Type    RH Alec Rg PTA Physical Therapist Assistant    Andrea Zapata, PT Physical Therapist    Freeman Rizzo, PT Student PT Student                     Time Calculation:    PT Charges       Row Name 03/18/25 1446 03/18/25 1142          Time Calculation    PT Received On 03/18/25  -RH 03/18/25  -RH        Timed Charges    79082 - Gait Training Minutes  8  -RH 9  -RH     80119 - PT Therapeutic Activity Minutes 4  -RH 5  -RH        Untimed Charges    PT Group Therapy Minutes 20  -RH 30  -RH        Total Minutes    Timed Charges Total Minutes 12  -RH 14  -RH     Untimed Charges Total Minutes 20  -RH 30  -RH      Total Minutes 32  -RH 44  -RH               User Key  (r) = Recorded By, (t) = Taken By, (c) = Cosigned By      Initials Name Provider Type    Alec Bae PTA Physical Therapist Assistant                  Therapy Charges for Today       Code Description Service Date Service Provider Modifiers Qty    11549422536 HC GAIT TRAINING EA 15 MIN 3/18/2025  Alec Rg, PTA GP 1    40171470894 HC PT THER PROC GROUP 3/18/2025 Alec Rg, PTA GP 1    54316357599 HC GAIT TRAINING EA 15 MIN 3/18/2025 Alec Rg, PTA GP 1    30751278901 HC PT THER PROC GROUP 3/18/2025 Alec Rg, PTA GP 1            PT G-Codes  Outcome Measure Options: AM-PAC 6 Clicks Daily Activity (OT), Optimal Instrument  AM-PAC 6 Clicks Score (PT): 20  AM-PAC 6 Clicks Score (OT): 21    Alec Rg PTA  3/18/2025

## 2025-03-18 NOTE — DISCHARGE SUMMARY
[facility]          HOSPITALIST  PROGRESS NOTE/DISCHARGE SUMMARY    Patient Name: Marci Dudley  : 1945  MRN: [id number]    Date of Admission: 3/17/2025  Date of Discharge:  25  Primary Care Physician: Conor Mosquera MD    Consultants:  -Orthopedic Surgery: Dr. Nani Watson    Hospital Problems:  Osteoarthritis of both hips s/p left total hip arthroplasty  Essential hypertension  Depression  Chronic diastolic heart failure  Anxiety  CARLOS  Fibromyalgia  ILD  DJD    Hospital Course   Interval Course:  No acute issues overnight.  Patient did have some nausea but it has improved some this morning.  Patient to work with PT/OT.  Nursing with no additional acute issues to report.  Potassium was slightly elevated on a.m. labs.    Review of Systems:  All systems reviewed and negative unless stated otherwise under subjective.    Plan:  -Repeat BMP.  If potassium stable/improved patient okay to discharge  -DVT prophylaxis with apixaban 2.5 mg daily and once complete patient is transition to aspirin 3 and 25 mg daily for 4 weeks per orthopedic surgery  -Pain management with as needed [employer] per Orthopedic Surgery  -Resume home Jardiance  -Resume home lisinopril and spironolactone for hypertension and heart failure    Hospital Course:  Marci Dudley is a 79 y.o. female with essential hypertension, chronic diastolic heart failure, anxiety/depression, CARLOS and ILD that presented for scheduled left total hip arthroplasty.  Patient tolerated procedure without any acute postprocedural complications.  Hospitalist service contacted for assistance in management of patient's chronic medical conditions.  Patient worked well with PT/OT services.  Patient will discharge home with home health and outpatient follow-up.  Patient is to discharge on apixaban 2.5 mg daily followed by aspirin 3 and 25 mg daily for DVT prophylaxis per orthopedic surgery.    DISCHARGE Follow Up Recommendations for labs and  diagnostics:   -Follow-up with orthopedic surgery in 2 weeks    Day of Discharge     Vital Signs:  Temp:  [97.3 °F (36.3 °C)-98.9 °F (37.2 °C)] 98.2 °F (36.8 °C)  Heart Rate:  [] 70  Resp:  [13-18] 18  BP: (101-143)/(59-81) 113/59  Flow (L/min) (Oxygen Therapy):  [1-2] 2  Physical Exam:   Gen: Conversant, Pleasant, sitting up in chair at bedside  Resp: CTAB, No w/r/r, No respiratory distress appreciated, equal chest rise bilaterally  Card: RRR, No m/r/g  Abd: Soft, Nontender, Nondistended, + bowel sounds    Discharge Details        Discharge Medications        New Medications        Instructions Start Date   apixaban 2.5 MG tablet tablet  Commonly known as: ELIQUIS   2.5 mg, Oral, Every 12 Hours Scheduled, Once Eliquis is completed, begin aspirin 325mg daily for 4 weeks   Start Date: March 19, 2025     HYDROcodone-acetaminophen 7.5-325 MG per tablet  Commonly known as: NORCO   1-2 tablets, Oral, Every 4 Hours PRN             Continue These Medications        Instructions Start Date   baclofen 20 MG tablet  Commonly known as: LIORESAL   Take 1 tablet by mouth 2 (Two) Times a Day.      citalopram 10 MG tablet  Commonly known as: CeleXA   10 mg, Oral, Daily      empagliflozin 10 MG tablet tablet  Commonly known as: Jardiance   10 mg, Oral, Daily      gabapentin 300 MG capsule  Commonly known as: NEURONTIN   600 mg, Nightly      lisinopril 10 MG tablet  Commonly known as: PRINIVIL,ZESTRIL   5 mg, Oral, Daily      oxybutynin 5 MG tablet  Commonly known as: DITROPAN   5 mg, 2 Times Daily      spironolactone 25 MG tablet  Commonly known as: ALDACTONE   12.5 mg, Oral, Daily               Allergies   Allergen Reactions    Benzoin Anaphylaxis and Hives    Ciprofloxacin Hives and Shortness Of Breath    Iodine Anaphylaxis    Penicillins Anaphylaxis, Itching and Swelling    Adhesive Tape Rash    Latex Hives and Rash    Levofloxacin Rash       Discharge Disposition:  Home-Health Care Svc    Diet:  Hospital:  Diet Order    Procedures    Diet: Regular/House; Fluid Consistency: Thin (IDDSI 0)       Discharge Activity:   Activity Instructions       Up WIth Assist              CODE STATUS:  There are no questions and answers to display.       Future Appointments   Date Time Provider Department Center   4/1/2025  9:15 AM Rachael Ambriz APRN [facility] ORS RING [facility]   5/21/2025 12:00 PM Conor Mosquera MD [facility] PC W JAJA [facility]   6/19/2025  2:00 PM Tree Moseley MD [facility] EMILY ETWN [facility]   8/28/2025 11:15 AM Sandy Valiente MD [facility] U ETRING [facility]       Additional Instructions for the Follow-ups that You Need to Schedule       Ambulatory Referral to Home Health   As directed      Face to Face Visit Date: 3/18/2025   Follow-up provider for Plan of Care?: I will be treating the patient on an ongoing basis.  Please send me the Plan of Care for signature.   Follow-up provider: BRENDA WATSON [[id number]]   Reason/Clinical Findings: Status post left hip replacement   Describe mobility limitations that make leaving home difficult: Status post left hip replacement   Nursing/Therapeutic Services Requested: Physical Therapy Skilled Nursing Occupational Therapy   Skilled nursing orders: Pain management Wound care dressing/changes   PT orders: Total joint pathway Therapeutic exercise Gait Training Strengthening   Weight Bearing Status: As Tolerated   Occupational orders: Activities of daily living Home safety assessment Energy conservation Strengthening   Frequency: 1 Week 1        Discharge Follow-up with Specified Provider: Dr Watson's office; 2 Weeks   As directed      To: Dr Watson's office   Follow Up: 2 Weeks        Referral to Physical Therapy   As directed      2-3x/week for 8-12 weeks  + modalities    Order Comments: 2-3x/week for 8-12 weeks + modalities    Specialty needed: Evaluate and treat POST OP   Follow-up needed: Yes                Pertinent  and/or Most Recent Results     PROCEDURES:   -Left total hip arthroplasty (03/17/2025)    RADIOLOGY:  XR  Hip With or Without Pelvis 2 - 3 View Left [[id number]] Reviewed: 03/17/25 1446   Order Status: Completed Collected: 03/17/25 1235    Updated: 03/17/25 1239   Narrative:     XR HIP W OR WO PELVIS 2-3 VIEW LEFT    Date of Exam: 3/17/2025 12:20 PM EDT    Indication: Post-Op Hip Arthroplasty    Comparison: 1/14/2025    Findings:  Patient is now status post total left hip arthroplasty. Prosthetic components are in anatomic position. There is no evidence of hardware complication. Postoperative gas is seen within the soft tissues.   Impression:     Impression:    1. Status post total left hip arthroplasty. No hardware complication.      Electronically Signed: Eric Abraham MD   3/17/2025 12:37 PM EDT   Workstation ID: [id number]       LAB RESULTS:      Lab 03/18/25  0425   HEMOGLOBIN 14.1   HEMATOCRIT 43.6         Lab 03/18/25  1134 03/18/25  0425 03/12/25  1440   SODIUM 134* 136 137   POTASSIUM 4.5 5.5* 4.6   CHLORIDE 104 105 105   CO2 19.8* 25.3 22.5   ANION GAP 10.2 5.7 9.5   BUN 21 18 14   CREATININE 0.90 0.77 0.86   EGFR 65.2 78.6 68.8   GLUCOSE 97 122* 88   CALCIUM 8.6 8.7 9.7                         Brief Urine Lab Results  (Last result in the past 365 days)        Color   Clarity   Blood   Leuk Est   Nitrite   Protein   CREAT   Urine HCG        04/02/24 2202 Velpen   Turbid     Comment: Result not available due to interfering substances.     Comment: Result not available due to interfering substances.     Comment: Result not available due to interfering substances.     Comment: Result not available due to interfering substances.                 Microbiology Results (last 10 days)       ** No results found for the last 240 hours. **                      Results for orders placed during the hospital encounter of 03/13/25    Adult Transthoracic Echo Complete W/ Cont if Necessary Per Protocol    Interpretation Summary  Technically difficult study due to poor acoustic windows.  All cardiac structures are not  well-visualized.    Normal chamber sizes.  LV has normal wall thickness.  No regional wall motion abnormalities are present.  Systolic function is normal calculated LVEF is greater than 55%.  Diastolic function is indeterminate.  Tissue Doppler velocities are reduced  RV has normal systolic function.  Trileaflet sclerotic aortic valve.  There is no aortic valve stenosis/regurgitation.  Mitral valve has thickened leaflets with preserved leaflet excursion.  No significant MR is recorded  Tricuspid valve is not well-visualized.  Trace TR is recorded.  No pericardial effusion is noted.  IVC has normal size.  Estimated right atrial pressure is 0 to 5 mmHg    No prior studies available for comparison      Labs Pending at Discharge:      Time spent personally reviewing patients labs, imaging and discussing care plan with patient, nurse at bedside and consultants (Orthopedic Surgery): 35 minutes.    Electronically signed by Les Hall MD, 03/18/25, 12:53 PM EDT.

## 2025-03-18 NOTE — PROGRESS NOTES
" Orthopedic Total Hip Progress Note    Assessment/Plan requesting home health, follow-up 2 weeks, DVT prophylaxis, PT/OT    Status post-left total hip arthroplasty: Doing well postoperatively.    Pain Relief: some relief    Continues current post-op course    Activity: up with assistance    Weight Bearing: WBAT     LOS: 0 days     Subjective     Post-Operative Day: 1 post-op total hip arthroplasty  Systemic or Specific Complaints: No Complaints    Objective     Vital signs in last 24 hours:  Vitals:    03/17/25 2209 03/17/25 2239 03/18/25 0043 03/18/25 0443   BP:   128/81 110/66   BP Location:   Left arm Left arm   Patient Position:   Lying Lying   Pulse:  81 65 71   Resp:  18 18 18   Temp:   98.9 °F (37.2 °C) 97.9 °F (36.6 °C)   TempSrc:   Oral Oral   SpO2: 93% 91% 95% 95%   Weight:       Height:            General: alert, appears stated age, and cooperative   Neurovascular: Tibial nerve: Intact, Superficial peroneal nerve: Intact, and Deep peroneal nerve: Intact  Capillary refill: Normal   Wound: Wound clean and dry no evidence of infection.   Range of Motion: Limited flexsion and Limited extension   DVT Exam: No evidence of DVT seen on physical exam.      Hemoglobin   Date Value Ref Range Status   03/18/2025 14.1 12.0 - 15.9 g/dL Final     Hematocrit   Date Value Ref Range Status   03/18/2025 43.6 34.0 - 46.6 % Final        Basic Metabolic Panel    Sodium Sodium   Date Value Ref Range Status   03/18/2025 136 136 - 145 mmol/L Final      Potassium Potassium   Date Value Ref Range Status   03/18/2025 5.5 (H) 3.5 - 5.2 mmol/L Final      Chloride Chloride   Date Value Ref Range Status   03/18/2025 105 98 - 107 mmol/L Final      Bicarbonate No results found for: \"PLASMABICARB\"   BUN BUN   Date Value Ref Range Status   03/18/2025 18 8 - 23 mg/dL Final      Creatinine Creatinine   Date Value Ref Range Status   03/18/2025 0.77 0.57 - 1.00 mg/dL Final      Calcium Calcium   Date Value Ref Range Status   03/18/2025 8.7 8.6 " "- 10.5 mg/dL Final      Glucose      No components found for: \"GLUCOSE.*\"      XR Hip With or Without Pelvis 2 - 3 View Left   Final Result   Impression:      1. Status post total left hip arthroplasty. No hardware complication.         Electronically Signed: Eric Abraham MD     3/17/2025 12:37 PM EDT     Workstation ID: [id number]      FL Surgery Fluoro   Final Result           "

## 2025-03-26 DIAGNOSIS — R26.2 DIFFICULTY IN WALKING: ICD-10-CM

## 2025-03-27 ENCOUNTER — TELEPHONE (OUTPATIENT)
Dept: ORTHOPEDIC SURGERY | Facility: CLINIC | Age: 80
End: 2025-03-27
Payer: MEDICARE

## 2025-03-27 DIAGNOSIS — R26.2 DIFFICULTY IN WALKING: ICD-10-CM

## 2025-03-27 RX ORDER — HYDROCODONE BITARTRATE AND ACETAMINOPHEN 7.5; 325 MG/1; MG/1
1 TABLET ORAL EVERY 4 HOURS PRN
Qty: 42 TABLET | Refills: 0 | OUTPATIENT
Start: 2025-03-27

## 2025-03-27 RX ORDER — HYDROCODONE BITARTRATE AND ACETAMINOPHEN 7.5; 325 MG/1; MG/1
1 TABLET ORAL EVERY 4 HOURS PRN
Qty: 42 TABLET | Refills: 0 | Status: SHIPPED | OUTPATIENT
Start: 2025-03-27

## 2025-03-27 NOTE — TELEPHONE ENCOUNTER
Caller: Marci Dudley    Relationship: Self    Best call back number: [phone]    Requested Prescriptions:       HYDROcodone-acetaminophen (NORCO) 7.5-325 MG per tablet     Requested Prescriptions      No prescriptions requested or ordered in this encounter        Pharmacy where request should be sent:        [facility] [address] [place] [address] [phone] [facility] [phone]          Last office visit with prescribing clinician: 1/14/2025   Last telemedicine visit with prescribing clinician: Visit date not found   Next office visit with prescribing clinician: Visit date not found     Additional details provided by patient: PT STATES THAT SHE WAS TOLD ON YESTERDAY THAT THE REFILL BE COMPLETED- SHE CALLED [facility] TODAY AND THEY DO NOT HAVE THE ORDER.    PT STATE THAT SHE NEEDS THE PAIN MEDICATION TODAY-     Does the patient have less than a 3 day supply:  [x] Yes  [] No    Would you like a call back once the refill request has been completed: [x] Yes [] No    If the office needs to give you a call back, can they leave a voicemail: [] Yes [x] No

## 2025-04-01 ENCOUNTER — OFFICE VISIT (OUTPATIENT)
Dept: ORTHOPEDIC SURGERY | Facility: CLINIC | Age: 80
End: 2025-04-01
Payer: MEDICARE

## 2025-04-01 VITALS
HEIGHT: 67 IN | DIASTOLIC BLOOD PRESSURE: 71 MMHG | SYSTOLIC BLOOD PRESSURE: 126 MMHG | HEART RATE: 66 BPM | BODY MASS INDEX: 27.78 KG/M2 | OXYGEN SATURATION: 90 % | WEIGHT: 177 LBS

## 2025-04-01 DIAGNOSIS — Z96.642 AFTERCARE FOLLOWING LEFT HIP JOINT REPLACEMENT SURGERY: ICD-10-CM

## 2025-04-01 DIAGNOSIS — Z47.1 AFTERCARE FOLLOWING LEFT HIP JOINT REPLACEMENT SURGERY: ICD-10-CM

## 2025-04-01 DIAGNOSIS — M25.552 LEFT HIP PAIN: ICD-10-CM

## 2025-04-01 DIAGNOSIS — Z96.642 S/P TOTAL LEFT HIP ARTHROPLASTY: Primary | ICD-10-CM

## 2025-04-01 PROCEDURE — 3074F SYST BP LT 130 MM HG: CPT

## 2025-04-01 PROCEDURE — 1160F RVW MEDS BY RX/DR IN RCRD: CPT

## 2025-04-01 PROCEDURE — 99024 POSTOP FOLLOW-UP VISIT: CPT

## 2025-04-01 PROCEDURE — 3078F DIAST BP <80 MM HG: CPT

## 2025-04-01 PROCEDURE — 1159F MED LIST DOCD IN RCRD: CPT

## 2025-04-01 RX ORDER — ASPIRIN 325 MG
325 TABLET, DELAYED RELEASE (ENTERIC COATED) ORAL EVERY 6 HOURS PRN
COMMUNITY

## 2025-04-01 NOTE — PROGRESS NOTES
Chief Complaint  Follow-up of the Left Hip    Subjective      Marci Dudley presents to [facility]     History of Present Illness  She is here today following up on her left hip.  She is 2 weeks status post left total hip arthroplasty performed Dr. Watson on 3/17/2025.    She reports a significant improvement in her hip condition, although the initial two weeks post-surgery were challenging. She has been managing her pain with medication, initially adhering to a regimen of one pill every four hours. However, she has since reduced this to one pill in the morning and one at night, reserving additional doses for therapy days. She has refilled her pain medication once and has been using bathing cloths and sponge baths for hygiene. She has been applying ice to the area consistently and performing exercises prescribed by her therapist. She has been using a walker for mobility due to other health complications. She has been using dry bandages and has attempted to expose the area to air for about an hour. She has also been applying cortisone 10 and aloe vera to the area, avoiding the stitches. She had significant irritation due to the bandage and consulted a pharmacist on what to use topically. It is better since there is no adhesive and no further bandage.    Supplemental Information  She has a history of breast cancer and underwent a latissimus flap procedure, which involved the removal of a wedge from her back. This has resulted in a constant sensation of wearing a corset, regardless of whether she is wearing a bra or not. She also suffers from neuropathy.    ALLERGIES  The patient is allergic to tape and has had a bad breakout from them.    Allergies   Allergen Reactions    Benzoin Anaphylaxis and Hives    Ciprofloxacin Hives and Shortness Of Breath    Iodine Anaphylaxis    Penicillins Anaphylaxis, Itching and Swelling    Adhesive Tape Rash    Latex Hives and Rash    Levofloxacin Rash  "      Objective     Vital Signs:   Vitals:    04/01/25 0903   BP: 126/71   Pulse: 66   SpO2: 90%   Weight: 80.3 kg (177 lb)   Height: 170.2 cm (67.01\")     Body mass index is 27.72 kg/m².    I reviewed the patient's chief complaint, history of present illness, review of systems, past medical history, surgical history, family history, social history, medications, and allergy list.     Ortho Exam    General: Alert. No acute distress.  Gait: Nonantalgic gait.    Left Hip:Staples removed today in office without complication. Steri-strips deferred per patient request due to allergy to adhesive.  Incision well healing small amount of serosanguineous drainage is seen from the incision.  Dry gauze pad was applied over the incision.  No pain with passive hip ROM.  Intact active hip ROM with no reported pain. Non tender over the thigh or knee distally. Demonstrates intact active ankle dorsiflexion and plantarflexion. Demonstrates intact sensation over dorsal and plantar foot.  Calf soft, nontender. Neurovascular intact distally. Palpable pedal pulses.           Imaging Results (Most Recent)       Procedure Component Value Units Date/Time    XR Hip With or Without Pelvis 2 - 3 View Left [[id number]] Resulted: 04/01/25 1200     Updated: 04/01/25 1200    Narrative:      X-Ray Report:  Study: X-rays ordered, taken in the office, and reviewed today.   Site: Left Hip Xray  Indication: Left total hip arthroplasty follow-up  View: AP, frog lateral left hip  Findings: Intact left total hip arthroplasty.  No evidence of hardware   complications or loosening.  No periprosthetic fractures are visualized.    Hip joint is reduced.  Prior studies available for comparison: yes                 Assessment and Plan   Diagnoses and all orders for this visit:    1. S/P total left hip arthroplasty (Primary)  -     Ambulatory Referral to Physical Therapy for Evaluation & Treatment    2. Aftercare following left hip joint replacement surgery  -     " XR Hip With or Without Pelvis 2 - 3 View Left  -     Ambulatory Referral to Physical Therapy for Evaluation & Treatment    3. Left hip pain  -     Ambulatory Referral to Physical Therapy for Evaluation & Treatment         Marci Dudley presents today 2 weeks status post left total hip arthroplasty performed by Dr. Watson on 3/17/25. X-rays were obtained and reviewed with the patient today showing intact hardware without complications. Staples/sutures removed today without complications. Steri-strips applied with instructions to keep incision clean and dry, allowing steri-strips to fall off on their own in 7-10 days. Incision site care was reviewed today. Patient instructed not to soak or submerge incision. Do not apply any lotions, creams, or ointments to the incision at this time.  We discussed concerning signs and symptoms regarding the incision site.  She is advised to keep the incision clean and dry and allow it to be open to air is much as possible.  Contact the office if any concerns develop.    Patient instructed to continue with formal physical therapy and the use of cane/walker until recommended by PT to discontinue. We discussed the importance of home exercises in addition to PT. We discussed swelling management with continued icing of the hip for approximately 15-20 minutes, three times daily, and as needed. We discussed the importance of weaning from narcotic medications.     Patient will continue ASA for DVT prophylaxis until prescription completed. Patient expressed understanding.     Patient will follow up in 4 weeks for reevaluation. Repeat imaging not needed at this this visit.     Call or return if symptoms worsen or patient has any concerns.            Tobacco Use: Low Risk  (4/1/2025)    Patient History     Smoking Tobacco Use: Never     Smokeless Tobacco Use: Never     Passive Exposure: Never     Patient reports that they are a nonsmoker; cessation education not applicable.            Follow  Up   Return in about 1 month (around 5/1/2025).  There are no Patient Instructions on file for this visit.    Patient was given instructions and counseling regarding her condition or for health maintenance advice. Please see specific information pulled into the AVS if appropriate.     Patient or patient representative verbalized consent for the use of Ambient Listening during the visit with  EDER Mosqueda for chart documentation. 4/2/2025  07:32 EDT    Dictated Utilizing Dragon Dictation. Please note that portions of this note were completed with a voice recognition program. Part of this note may be an electronic transcription/translation of spoken language to printed text using the Dragon Dictation System.

## 2025-04-23 ENCOUNTER — TELEPHONE (OUTPATIENT)
Dept: ORTHOPEDIC SURGERY | Facility: CLINIC | Age: 80
End: 2025-04-23
Payer: MEDICARE

## 2025-04-24 RX ORDER — SENNOSIDES 8.6 MG
325 CAPSULE ORAL EVERY 6 HOURS PRN
Qty: 90 TABLET | Refills: 1 | Status: SHIPPED | OUTPATIENT
Start: 2025-04-24

## 2025-04-24 RX ORDER — BACLOFEN 20 MG/1
20 TABLET ORAL 2 TIMES DAILY
Qty: 180 TABLET | Refills: 1 | Status: SHIPPED | OUTPATIENT
Start: 2025-04-24

## 2025-04-24 RX ORDER — OXYBUTYNIN CHLORIDE 5 MG/1
5 TABLET ORAL 2 TIMES DAILY
Qty: 180 TABLET | Refills: 1 | Status: SHIPPED | OUTPATIENT
Start: 2025-04-24

## 2025-05-01 ENCOUNTER — OFFICE VISIT (OUTPATIENT)
Dept: ORTHOPEDIC SURGERY | Facility: CLINIC | Age: 80
End: 2025-05-01
Payer: MEDICARE

## 2025-05-01 VITALS
HEIGHT: 67 IN | OXYGEN SATURATION: 93 % | WEIGHT: 177 LBS | BODY MASS INDEX: 27.78 KG/M2 | DIASTOLIC BLOOD PRESSURE: 75 MMHG | HEART RATE: 70 BPM | SYSTOLIC BLOOD PRESSURE: 123 MMHG

## 2025-05-01 DIAGNOSIS — Z96.642 S/P TOTAL LEFT HIP ARTHROPLASTY: Primary | ICD-10-CM

## 2025-05-01 NOTE — PROGRESS NOTES
"Chief Complaint  Follow-up of the Left Hip     Subjective      Marci Dudley presents to [facility] for follow up of the left hip. She had a left total hip arthroplasty on 3/17/25.  She is here without assistive device.  She is doing well.  She is in [facility] therapy at [facility].      Allergies   Allergen Reactions    Benzoin Anaphylaxis and Hives    Ciprofloxacin Hives and Shortness Of Breath    Iodine Anaphylaxis    Penicillins Anaphylaxis, Itching and Swelling    Adhesive Tape Rash    Latex Hives and Rash    Levofloxacin Rash        Social History     Socioeconomic History    Marital status:    Tobacco Use    Smoking status: Never     Passive exposure: Never    Smokeless tobacco: Never   Vaping Use    Vaping status: Never Used   Substance and Sexual Activity    Alcohol use: Never    Drug use: Never    Sexual activity: Defer        I reviewed the patient's chief complaint, history of present illness, review of systems, past medical history, surgical history, family history, social history, medications, and allergy list.     Review of Systems     Constitutional: Denies fevers, chills, weight loss  Cardiovascular: Denies chest pain, shortness of breath  Skin: Denies rashes, acute skin changes  Neurologic: Denies headache, loss of consciousness        Vital Signs:   /75   Pulse 70   Ht 170.2 cm (67.01\")   Wt 80.3 kg (177 lb)   SpO2 93%   BMI 27.71 kg/m²          Physical Exam  General: Alert. No acute distress    Ortho Exam        LEFT HIP Mildly full ROM of the left hip. . No skin discoloration, atrophy or swelling. Positive EHL, FHL, GS, and TA. Sensation intact to all 5 nerves of the foot.  Leg lengths appear equal. Ambulates with Antalgic gait  . Knee Extensor Mechanism  intact No evidence of wound dehiscence, surrounding erythema, warmth, or drainage.  Well healing incision.       Procedures      Imaging Results (Most Recent)       None             Result Review " :             Assessment and Plan     Diagnoses and all orders for this visit:    1. S/P total left hip arthroplasty (Primary)        Discussed the treatment plan with the patient. I reviewed the X-rays that were obtained today with the patient.     Continue physical therapy as needed.  Continue with exercises at home.        Call or return if worsening symptoms.    Follow Up     3-4 months with new X ray.       Patient was given instructions and counseling regarding her condition or for health maintenance advice. Please see specific information pulled into the AVS if appropriate.     Scribed for Nani Watson MD by Arina Leyva MA.  05/01/25   14:17 EDT    I have personally performed the services described in this document as scribed by the above individual and it is both accurate and complete. Nani Watson MD 05/01/25

## 2025-05-15 ENCOUNTER — TELEPHONE (OUTPATIENT)
Age: 80
End: 2025-05-15
Payer: MEDICARE

## 2025-05-15 NOTE — TELEPHONE ENCOUNTER
Caller: Marci Dudley    Relationship to patient: Self    Best call back number: [phone]    Patient is needing: PATIENT CALLED IN AND IS REQUESTING A CALL BACK REGARDING PROLIA SHOT. PATIENT WOULD LIKE TO KNOW IF IT WILL BE ADMINISTERED IN OFFICE AT HER UPCOMING APPOINTMENT.

## 2025-05-16 ENCOUNTER — LAB (OUTPATIENT)
Dept: LAB | Facility: HOSPITAL | Age: 80
End: 2025-05-16
Payer: MEDICARE

## 2025-05-16 DIAGNOSIS — I10 ESSENTIAL HYPERTENSION: ICD-10-CM

## 2025-05-16 DIAGNOSIS — E53.8 VITAMIN B12 DEFICIENCY: ICD-10-CM

## 2025-05-16 DIAGNOSIS — D75.1 POLYCYTHEMIA: ICD-10-CM

## 2025-05-16 DIAGNOSIS — E83.19 IRON OVERLOAD: ICD-10-CM

## 2025-05-16 DIAGNOSIS — R73.01 IMPAIRED FASTING GLUCOSE: ICD-10-CM

## 2025-05-16 LAB
ANION GAP SERPL CALCULATED.3IONS-SCNC: 11.6 MMOL/L (ref 5–15)
BASOPHILS # BLD AUTO: 0.03 10*3/MM3 (ref 0–0.2)
BASOPHILS NFR BLD AUTO: 0.5 % (ref 0–1.5)
BUN SERPL-MCNC: 14 MG/DL (ref 8–23)
BUN/CREAT SERPL: 16.7 (ref 7–25)
CALCIUM SPEC-SCNC: 10.1 MG/DL (ref 8.6–10.5)
CHLORIDE SERPL-SCNC: 105 MMOL/L (ref 98–107)
CO2 SERPL-SCNC: 25.4 MMOL/L (ref 22–29)
CREAT SERPL-MCNC: 0.84 MG/DL (ref 0.57–1)
DEPRECATED RDW RBC AUTO: 41.1 FL (ref 37–54)
EGFRCR SERPLBLD CKD-EPI 2021: 70.4 ML/MIN/1.73
EOSINOPHIL # BLD AUTO: 0.08 10*3/MM3 (ref 0–0.4)
EOSINOPHIL NFR BLD AUTO: 1.4 % (ref 0.3–6.2)
ERYTHROCYTE [DISTWIDTH] IN BLOOD BY AUTOMATED COUNT: 12.6 % (ref 12.3–15.4)
FERRITIN SERPL-MCNC: 225 NG/ML (ref 13–150)
FOLATE SERPL-MCNC: 17.1 NG/ML (ref 4.78–24.2)
GLUCOSE SERPL-MCNC: 85 MG/DL (ref 65–99)
HBA1C MFR BLD: 5.6 % (ref 4.8–5.6)
HCT VFR BLD AUTO: 45.8 % (ref 34–46.6)
HGB BLD-MCNC: 15.1 G/DL (ref 12–15.9)
IMM GRANULOCYTES # BLD AUTO: 0.02 10*3/MM3 (ref 0–0.05)
IMM GRANULOCYTES NFR BLD AUTO: 0.3 % (ref 0–0.5)
IRON 24H UR-MRATE: 102 MCG/DL (ref 37–145)
IRON SATN MFR SERPL: 28 % (ref 20–50)
LYMPHOCYTES # BLD AUTO: 1.68 10*3/MM3 (ref 0.7–3.1)
LYMPHOCYTES NFR BLD AUTO: 28.5 % (ref 19.6–45.3)
MAGNESIUM SERPL-MCNC: 2.3 MG/DL (ref 1.6–2.4)
MCH RBC QN AUTO: 29.2 PG (ref 26.6–33)
MCHC RBC AUTO-ENTMCNC: 33 G/DL (ref 31.5–35.7)
MCV RBC AUTO: 88.6 FL (ref 79–97)
MONOCYTES # BLD AUTO: 0.54 10*3/MM3 (ref 0.1–0.9)
MONOCYTES NFR BLD AUTO: 9.2 % (ref 5–12)
NEUTROPHILS NFR BLD AUTO: 3.55 10*3/MM3 (ref 1.7–7)
NEUTROPHILS NFR BLD AUTO: 60.1 % (ref 42.7–76)
NRBC BLD AUTO-RTO: 0 /100 WBC (ref 0–0.2)
PLATELET # BLD AUTO: 234 10*3/MM3 (ref 140–450)
PMV BLD AUTO: 10.3 FL (ref 6–12)
POTASSIUM SERPL-SCNC: 4.1 MMOL/L (ref 3.5–5.2)
RBC # BLD AUTO: 5.17 10*6/MM3 (ref 3.77–5.28)
SODIUM SERPL-SCNC: 142 MMOL/L (ref 136–145)
TIBC SERPL-MCNC: 364 MCG/DL (ref 298–536)
TRANSFERRIN SERPL-MCNC: 244 MG/DL (ref 200–360)
VIT B12 BLD-MCNC: 575 PG/ML (ref 211–946)
WBC NRBC COR # BLD AUTO: 5.9 10*3/MM3 (ref 3.4–10.8)

## 2025-05-16 PROCEDURE — 82607 VITAMIN B-12: CPT

## 2025-05-16 PROCEDURE — 36415 COLL VENOUS BLD VENIPUNCTURE: CPT

## 2025-05-16 PROCEDURE — 82746 ASSAY OF FOLIC ACID SERUM: CPT

## 2025-05-16 PROCEDURE — 80048 BASIC METABOLIC PNL TOTAL CA: CPT

## 2025-05-16 PROCEDURE — 83735 ASSAY OF MAGNESIUM: CPT

## 2025-05-16 PROCEDURE — 83036 HEMOGLOBIN GLYCOSYLATED A1C: CPT

## 2025-05-16 PROCEDURE — 84466 ASSAY OF TRANSFERRIN: CPT

## 2025-05-16 PROCEDURE — 82728 ASSAY OF FERRITIN: CPT

## 2025-05-16 PROCEDURE — 83540 ASSAY OF IRON: CPT

## 2025-05-16 PROCEDURE — 85025 COMPLETE CBC W/AUTO DIFF WBC: CPT

## 2025-05-21 ENCOUNTER — OFFICE VISIT (OUTPATIENT)
Age: 80
End: 2025-05-21
Payer: MEDICARE

## 2025-05-21 ENCOUNTER — HOSPITAL ENCOUNTER (OUTPATIENT)
Dept: INFUSION THERAPY | Facility: HOSPITAL | Age: 80
Discharge: HOME | End: 2025-05-21
Payer: MEDICARE

## 2025-05-21 VITALS
BODY MASS INDEX: 26.16 KG/M2 | TEMPERATURE: 98.3 F | SYSTOLIC BLOOD PRESSURE: 122 MMHG | DIASTOLIC BLOOD PRESSURE: 63 MMHG | OXYGEN SATURATION: 94 % | HEIGHT: 67 IN | WEIGHT: 166.67 LBS | RESPIRATION RATE: 20 BRPM | HEART RATE: 70 BPM

## 2025-05-21 VITALS
OXYGEN SATURATION: 93 % | SYSTOLIC BLOOD PRESSURE: 112 MMHG | HEART RATE: 69 BPM | DIASTOLIC BLOOD PRESSURE: 70 MMHG | BODY MASS INDEX: 26.74 KG/M2 | WEIGHT: 170.4 LBS | HEIGHT: 67 IN

## 2025-05-21 DIAGNOSIS — Z00.00 WELL ADULT EXAM: ICD-10-CM

## 2025-05-21 DIAGNOSIS — M81.0 AGE-RELATED OSTEOPOROSIS WITHOUT CURRENT PATHOLOGICAL FRACTURE: Primary | ICD-10-CM

## 2025-05-21 DIAGNOSIS — I10 ESSENTIAL HYPERTENSION: ICD-10-CM

## 2025-05-21 DIAGNOSIS — E55.9 VITAMIN D DEFICIENCY: ICD-10-CM

## 2025-05-21 DIAGNOSIS — R73.01 IMPAIRED FASTING GLUCOSE: ICD-10-CM

## 2025-05-21 DIAGNOSIS — I50.32 CHRONIC DIASTOLIC (CONGESTIVE) HEART FAILURE: Primary | ICD-10-CM

## 2025-05-21 PROBLEM — M16.9 OA (OSTEOARTHRITIS) OF HIP: Status: RESOLVED | Noted: 2025-01-15 | Resolved: 2025-05-21

## 2025-05-21 PROBLEM — M79.605 BILATERAL LEG PAIN: Status: RESOLVED | Noted: 2024-12-26 | Resolved: 2025-05-21

## 2025-05-21 PROBLEM — M79.604 BILATERAL LEG PAIN: Status: RESOLVED | Noted: 2024-12-26 | Resolved: 2025-05-21

## 2025-05-21 PROCEDURE — 96372 THER/PROPH/DIAG INJ SC/IM: CPT

## 2025-05-21 PROCEDURE — 25010000002 DENOSUMAB 60 MG/ML SOLUTION PREFILLED SYRINGE

## 2025-05-21 RX ADMIN — DENOSUMAB 60 MG: 60 INJECTION SUBCUTANEOUS at 12:25

## 2025-05-21 NOTE — ASSESSMENT & PLAN NOTE
Patient's A1c is still very stable at 5.6 as of 5/25 OV, certainly no meds needed, will check again in 6 months.    Of note, she was started on low-dose Jardiance by cardiology recently.

## 2025-05-21 NOTE — ASSESSMENT & PLAN NOTE
Patient was seen by cardiology in the interim, she had a echo with some diastolic dysfunction, so they added low doses of Jardiance and spironolactone.  She continued on her 10 mg of lisinopril, and even tried taking 5 mg, but still had systolic blood pressure less than 100.  She had been off lisinopril for nearly a month now, and she is having good blood pressure readings, but discussed with patient we certainly want to try to get this back on board.  Will treat as noted below.    1.  Lower spironolactone, the 25 mg tablet, to 1/2 tablet every other day.    2.  Resume lisinopril, the 10 mg tablet, but only take one half of this every day.    3.  Stay on the Jardiance 10 mg daily tablet.    4.  If in a week or 2 you are having low blood pressure readings, the top number below 100 consistently, then lower the Jardiance 10 mg tablet to every other day, and take it on the opposite day that you are taking the spironolactone.

## 2025-05-21 NOTE — PROGRESS NOTES
Chief Complaint  Follow-up (4 month. ), Hypertension, and Medication Problem (PT wants to discuss BP medicine. Her BP was dropping too low and she hasn't taken it in over a month. )    Subjective      Marci Dudley presents to [facility]    Hypertension  Pertinent negatives include no blurred vision, chest pain, headaches, neck pain, palpitations or shortness of breath.   Primary Care Follow-Up  Conditions present:  Other chronic condition  Associated symptoms include: myalgias and visual change. Pertinent negatives include no chest pain, no confusion, no nausea, no palpitations, no shortness of breath, no fatigue, no blurred vision, no weakness, no headaches, no neck pain, no wheezing, no abdominal pain, no cough, no sore throat, no depressed mood and no diaphoresis.     Ck up go over med's and team of Doctors. Before hip replacement on March 17. I  saw heart Doctor on 2 new med's.  Symptoms include: joint pain, change in stool, myalgias, numbness and visual change.   Pertinent negative symptoms include no abdominal pain, no anorexia, no chest pain, no chills, no congestion, no cough, no diaphoresis, no fatigue, no fever, no headaches, no joint swelling, no nausea, no neck pain, no rash, no sore throat, no swollen glands, no dysuria, no vertigo, no vomiting and no weakness.     History of present illness:  Patient is 80-year-old female with underlying hypertension, IFG, sleep apnea, recurrent depression, neuropathy, among others, who was seen in 9/24 as a new patient, and who is coming back in now 5/25 for routine 4 to 6-month follow-up.  We will go over her med list, review recent labs together address her care gaps, and new issues as time permits.      Review of Systems   Constitutional:  Negative for appetite change, chills, diaphoresis, fatigue and fever.   HENT:  Negative for congestion, ear pain, sore throat and swollen glands.    Eyes:  Negative for blurred vision.  "  Respiratory:  Negative for cough, chest tightness, shortness of breath and wheezing.    Cardiovascular:  Negative for chest pain, palpitations and leg swelling.   Gastrointestinal:  Negative for abdominal pain, anorexia, nausea and vomiting.   Genitourinary:  Negative for difficulty urinating, dysuria and hematuria.   Musculoskeletal:  Positive for joint pain and myalgias. Negative for arthralgias, gait problem and neck pain.   Skin:  Negative for rash and skin lesions.   Neurological:  Positive for numbness. Negative for vertigo, syncope, weakness, memory problem and confusion.   Psychiatric/Behavioral:  Negative for self-injury and depressed mood.        Objective   Vital Signs:   /70   Pulse 69   Ht 170.2 cm (67\")   Wt 77.3 kg (170 lb 6.4 oz)   SpO2 93%   BMI 26.69 kg/m²         Physical Exam  Vitals and nursing note reviewed.   Constitutional:       General: She is not in acute distress.     Appearance: Normal appearance. She is not toxic-appearing.   HENT:      Head: Atraumatic.      Right Ear: External ear normal.      Left Ear: External ear normal.      Nose: Nose normal.      Mouth/Throat:      Mouth: Mucous membranes are moist.   Eyes:      General:         Right eye: No discharge.         Left eye: No discharge.      Extraocular Movements: Extraocular movements intact.      Pupils: Pupils are equal, round, and reactive to light.   Neck:      Comments: No carotid bruits.  Cardiovascular:      Rate and Rhythm: Normal rate and regular rhythm.      Pulses: Normal pulses.      Heart sounds: Normal heart sounds. No murmur heard.     No gallop.      Comments: Heart tones normal, no ectopy, no S3.  Pulmonary:      Effort: Pulmonary effort is normal. No respiratory distress.      Breath sounds: No wheezing, rhonchi or rales.      Comments: Lung fields clear bilaterally.  Abdominal:      General: There is no distension.      Palpations: Abdomen is soft. There is no mass.      Tenderness: There is no " abdominal tenderness. There is no guarding.      Comments: No liver edge no bruit.   Musculoskeletal:         General: No swelling or tenderness.      Cervical back: No tenderness.      Right lower leg: No edema.      Left lower leg: No edema.      Comments: No peripheral edema.   Skin:     General: Skin is warm and dry.      Findings: No rash.   Neurological:      General: No focal deficit present.      Mental Status: She is alert and oriented to person, place, and time. Mental status is at baseline.      Motor: No weakness.      Gait: Gait normal.   Psychiatric:         Mood and Affect: Mood normal.         Thought Content: Thought content normal.          Result Review   The following data was reviewed by: Conor Mosquera MD on 09/23/2024:  [x] Laboratory  [] Microbiology  [x] Radiology  [] EKG/telemetry  [] Cardiology/Vascular  [] Pathology  [x] Old records             Assessment and Plan   Diagnoses and all orders for this visit:    1. Chronic diastolic (congestive) heart failure (Primary)  Assessment & Plan:  See below underneath the hypertension heading.        Orders:  -     BNP; Future    2. Essential hypertension  Overview:  Echo 3/25:  Normal chamber sizes.  LV has normal wall thickness.  No regional wall motion abnormalities are present.  Systolic function is normal calculated LVEF is greater than 55%.  Diastolic function is indeterminate.  Tissue Doppler velocities are reduced  RV has normal systolic function.  Trileaflet sclerotic aortic valve.  There is no aortic valve stenosis/regurgitation.  Mitral valve has thickened leaflets with preserved leaflet excursion.  No significant MR is recorded  Tricuspid valve is not well-visualized.  Trace TR is recorded.  No pericardial effusion is noted.  IVC has normal size.  Estimated right atrial pressure is 0 to 5 mmHg    Assessment & Plan:  Patient was seen by cardiology in the interim, she had a echo with some diastolic dysfunction, so they added low doses of  Jardiance and spironolactone.  She continued on her 10 mg of lisinopril, and even tried taking 5 mg, but still had systolic blood pressure less than 100.  She had been off lisinopril for nearly a month now, and she is having good blood pressure readings, but discussed with patient we certainly want to try to get this back on board.  Will treat as noted below.    1.  Lower spironolactone, the 25 mg tablet, to 1/2 tablet every other day.    2.  Resume lisinopril, the 10 mg tablet, but only take one half of this every day.    3.  Stay on the Jardiance 10 mg daily tablet.    4.  If in a week or 2 you are having low blood pressure readings, the top number below 100 consistently, then lower the Jardiance 10 mg tablet to every other day, and take it on the opposite day that you are taking the spironolactone.    Orders:  -     Comprehensive Metabolic Panel; Future  -     Magnesium; Future    3. Impaired fasting glucose  Assessment & Plan:  Patient's A1c is still very stable at 5.6 as of 5/25 OV, certainly no meds needed, will check again in 6 months.    Of note, she was started on low-dose Jardiance by cardiology recently.    Orders:  -     Hemoglobin A1c; Future    4. Vitamin D deficiency  -     Vitamin D,25-Hydroxy; Future    5. Well adult exam  -     Lipid Panel; Future  -     TSH; Future  -     T4, free; Future          Total Time Spent:  minutes     This time includes time spent by me in the following activities: preparing for the visit, reviewing extensive past medical history and tests, performing a medically appropriate examination and/or evaluation, counseling and educating the patient and/or caregivers, ordering medications, tests, or procedures, referring and/or communicating with other health care professionals and documenting information in the medical record all on this date of service.       Follow Up   Return in about 4 months (around 9/29/2025).  Patient was given instructions and counseling regarding her  condition or for health maintenance advice. Please see specific information pulled into the AVS if appropriate.

## 2025-05-21 NOTE — PATIENT INSTRUCTIONS
1.  Lower spironolactone, the 25 mg tablet, to 1/2 tablet every other day.    2.  Resume lisinopril, the 10 mg tablet, but only take one half of this every day.    3.  Stay on the Jardiance 10 mg daily tablet.    4.  If in a week or 2 you are having low blood pressure readings, the top number below 100 consistently, then lower the Jardiance 10 mg tablet to every other day, and take it on the opposite day that you are taking the spironolactone.

## 2025-05-22 DIAGNOSIS — G62.9 NEUROPATHY: Primary | ICD-10-CM

## 2025-05-22 RX ORDER — GABAPENTIN 300 MG/1
600 CAPSULE ORAL NIGHTLY
Qty: 180 CAPSULE | Refills: 1 | Status: SHIPPED | OUTPATIENT
Start: 2025-05-22

## 2025-05-29 ENCOUNTER — HOSPITAL ENCOUNTER (EMERGENCY)
Facility: HOSPITAL | Age: 80
Discharge: HOME | End: 2025-05-29
Attending: EMERGENCY MEDICINE
Payer: MEDICARE

## 2025-05-29 ENCOUNTER — APPOINTMENT (OUTPATIENT)
Dept: CT IMAGING | Facility: HOSPITAL | Age: 80
End: 2025-05-29
Payer: MEDICARE

## 2025-05-29 ENCOUNTER — TELEPHONE (OUTPATIENT)
Age: 80
End: 2025-05-29
Payer: MEDICARE

## 2025-05-29 VITALS
RESPIRATION RATE: 18 BRPM | DIASTOLIC BLOOD PRESSURE: 81 MMHG | TEMPERATURE: 97.9 F | HEIGHT: 67 IN | HEART RATE: 64 BPM | BODY MASS INDEX: 26.75 KG/M2 | OXYGEN SATURATION: 95 % | WEIGHT: 170.42 LBS | SYSTOLIC BLOOD PRESSURE: 153 MMHG

## 2025-05-29 DIAGNOSIS — R47.1 DYSARTHRIA: Primary | ICD-10-CM

## 2025-05-29 LAB
ALBUMIN SERPL-MCNC: 3.7 G/DL (ref 3.5–5.2)
ALBUMIN/GLOB SERPL: 1.4 G/DL
ALP SERPL-CCNC: 66 U/L (ref 39–117)
ALT SERPL W P-5'-P-CCNC: 10 U/L (ref 1–33)
ANION GAP SERPL CALCULATED.3IONS-SCNC: 8.2 MMOL/L (ref 5–15)
AST SERPL-CCNC: 16 U/L (ref 1–32)
BASOPHILS # BLD AUTO: 0.02 10*3/MM3 (ref 0–0.2)
BASOPHILS NFR BLD AUTO: 0.3 % (ref 0–1.5)
BILIRUB SERPL-MCNC: 0.3 MG/DL (ref 0–1.2)
BUN SERPL-MCNC: 12.2 MG/DL (ref 8–23)
BUN/CREAT SERPL: 18.2 (ref 7–25)
CALCIUM SPEC-SCNC: 8.8 MG/DL (ref 8.6–10.5)
CHLORIDE SERPL-SCNC: 104 MMOL/L (ref 98–107)
CO2 SERPL-SCNC: 25.8 MMOL/L (ref 22–29)
CREAT SERPL-MCNC: 0.67 MG/DL (ref 0.57–1)
DEPRECATED RDW RBC AUTO: 45.6 FL (ref 37–54)
EGFRCR SERPLBLD CKD-EPI 2021: 88.5 ML/MIN/1.73
EOSINOPHIL # BLD AUTO: 0.14 10*3/MM3 (ref 0–0.4)
EOSINOPHIL NFR BLD AUTO: 2.4 % (ref 0.3–6.2)
ERYTHROCYTE [DISTWIDTH] IN BLOOD BY AUTOMATED COUNT: 13.7 % (ref 12.3–15.4)
GLOBULIN UR ELPH-MCNC: 2.6 GM/DL
GLUCOSE SERPL-MCNC: 88 MG/DL (ref 65–99)
HCT VFR BLD AUTO: 44.3 % (ref 34–46.6)
HGB BLD-MCNC: 14.3 G/DL (ref 12–15.9)
IMM GRANULOCYTES # BLD AUTO: 0.01 10*3/MM3 (ref 0–0.05)
IMM GRANULOCYTES NFR BLD AUTO: 0.2 % (ref 0–0.5)
LYMPHOCYTES # BLD AUTO: 2.11 10*3/MM3 (ref 0.7–3.1)
LYMPHOCYTES NFR BLD AUTO: 36.8 % (ref 19.6–45.3)
MCH RBC QN AUTO: 29.1 PG (ref 26.6–33)
MCHC RBC AUTO-ENTMCNC: 32.3 G/DL (ref 31.5–35.7)
MCV RBC AUTO: 90.2 FL (ref 79–97)
MONOCYTES # BLD AUTO: 0.45 10*3/MM3 (ref 0.1–0.9)
MONOCYTES NFR BLD AUTO: 7.8 % (ref 5–12)
NEUTROPHILS NFR BLD AUTO: 3.01 10*3/MM3 (ref 1.7–7)
NEUTROPHILS NFR BLD AUTO: 52.5 % (ref 42.7–76)
NRBC BLD AUTO-RTO: 0 /100 WBC (ref 0–0.2)
PLATELET # BLD AUTO: 208 10*3/MM3 (ref 140–450)
PMV BLD AUTO: 9.6 FL (ref 6–12)
POTASSIUM SERPL-SCNC: 4.1 MMOL/L (ref 3.5–5.2)
PROT SERPL-MCNC: 6.3 G/DL (ref 6–8.5)
RBC # BLD AUTO: 4.91 10*6/MM3 (ref 3.77–5.28)
SODIUM SERPL-SCNC: 138 MMOL/L (ref 136–145)
WBC NRBC COR # BLD AUTO: 5.74 10*3/MM3 (ref 3.4–10.8)

## 2025-05-29 PROCEDURE — 36415 COLL VENOUS BLD VENIPUNCTURE: CPT

## 2025-05-29 PROCEDURE — 70450 CT HEAD/BRAIN W/O DYE: CPT

## 2025-05-29 PROCEDURE — 80053 COMPREHEN METABOLIC PANEL: CPT

## 2025-05-29 PROCEDURE — 85025 COMPLETE CBC W/AUTO DIFF WBC: CPT

## 2025-05-29 PROCEDURE — 99284 EMERGENCY DEPT VISIT MOD MDM: CPT

## 2025-05-29 NOTE — DISCHARGE INSTRUCTIONS
I did place an ambulatory referral as we discussed.  You can expect to receive a call in the next 2-3 business days to schedule an appointment with a neurologist.  Return to the emergency department immediately for sudden onset severe headache, numbness tingling or one-sided weakness, sudden vision change.
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
## 2025-06-19 ENCOUNTER — OFFICE VISIT (OUTPATIENT)
Dept: NEUROLOGY | Facility: CLINIC | Age: 80
End: 2025-06-19
Payer: MEDICARE

## 2025-06-19 VITALS
HEART RATE: 66 BPM | BODY MASS INDEX: 26.38 KG/M2 | HEIGHT: 67 IN | SYSTOLIC BLOOD PRESSURE: 121 MMHG | WEIGHT: 168.1 LBS | DIASTOLIC BLOOD PRESSURE: 58 MMHG

## 2025-06-19 DIAGNOSIS — R47.1 DYSARTHRIA: ICD-10-CM

## 2025-06-19 DIAGNOSIS — R25.2 BILATERAL LEG CRAMPS: Primary | ICD-10-CM

## 2025-06-19 DIAGNOSIS — R79.9 ABNORMAL FINDING OF BLOOD CHEMISTRY, UNSPECIFIED: ICD-10-CM

## 2025-06-19 PROCEDURE — 3074F SYST BP LT 130 MM HG: CPT | Performed by: PSYCHIATRY & NEUROLOGY

## 2025-06-19 PROCEDURE — 99214 OFFICE O/P EST MOD 30 MIN: CPT | Performed by: PSYCHIATRY & NEUROLOGY

## 2025-06-19 PROCEDURE — 1159F MED LIST DOCD IN RCRD: CPT | Performed by: PSYCHIATRY & NEUROLOGY

## 2025-06-19 PROCEDURE — 3078F DIAST BP <80 MM HG: CPT | Performed by: PSYCHIATRY & NEUROLOGY

## 2025-06-19 PROCEDURE — 1160F RVW MEDS BY RX/DR IN RCRD: CPT | Performed by: PSYCHIATRY & NEUROLOGY

## 2025-06-19 NOTE — PROGRESS NOTES
Chief Complaint  Follow-up (Re-eval legs)    Subjective          Marci Dudley is a 80 y.o. female who presents to [facility]  History of Present Illness      History of Present Illness  The patient presents for evaluation of leg cramps and slurred speech.    She reports persistent numbness in her feet, which is particularly noticeable upon waking up in the morning and when reclining in the evening. She experiences leg cramps during these times, with the muscles becoming hard and painful. This issue has been ongoing for approximately 5 years, initially affecting her groin area but now localized to her knees and below. The cramps occur early in the morning, around 7:30, often waking her up and necessitating a bathroom visit. She describes the sensation as a spasm, causing the muscles to harden and become painful. She also reports symptoms of restless legs syndrome, for which she takes medication, although she does not believe it is effective. The cramps typically last between 4 to 10 minutes, sometimes recurring after a brief respite. She also experiences toe clubbing, where her toes bend under, and she must wait for the cramps to subside before she can get up. These symptoms do not disturb her sleep. She has been performing leg lifts, marches, and stretching exercises while in bed, pressing her legs and bottom as hard as she can against the bed. She has previously attended therapy at [facility], where she was given exercises to perform. She has been taking gabapentin 300 mg at night around 9:00 PM, but it does not seem to alleviate the cramps.    She has been experiencing slurred speech since her surgery in 03/2025. The slurring occurs when she talks for extended periods, such as during a 30-minute phone call. She first noticed the issue in 05/2025, during a graduation ceremony in [place], where she bit her tongue. She also reports difficulty swallowing, describing it as  "an inability to control her tongue properly, similar to having mucus in her throat. She has undergone a CT scan.    She uses a CPAP machine and feels well-rested upon waking. She is currently transitioning to a new sleep specialist, Dr. Lauren Payne, but plans to request a referral from Dr. Carrasco to a provider in [facility].    SOCIAL HISTORY:    Hobbies: Board games    Sleep: Uses CPAP machine, feels well-rested upon waking    PAST SURGICAL HISTORY: Hip replacement    MEDICATIONS  CURRENT MEDS:  Gabapentin 300 mg Oral Twice daily  PREVIOUS MEDS:        Objective   Vital Signs:   /58   Pulse 66   Ht 170.2 cm (67.01\")   Wt 76.2 kg (168 lb 1.6 oz)   BMI 26.32 kg/m²     Physical Exam   Alert, fluent, phasic, follows commands well.  Speech is normal.  There is no focal weakness.     Results           Assessment and Plan  Diagnoses and all orders for this visit:    1. Dysarthria (Primary)  -     MRI Brain With & Without Contrast; Future  -     Acetylcholine Receptor Antibody Panel; Future  -     CK; Future         Assessment & Plan  1. Leg cramps.  She reports experiencing leg cramps every morning and evening, which have been ongoing for years. The cramps occur when she is lying down or sitting and are described as muscle spasms that cause significant pain. She currently takes gabapentin 600 mg at night but reports it is not effective. She is advised to adjust the timing of her gabapentin intake to 6:00 PM to potentially alleviate the cramps. Additionally, she is instructed to perform stretching exercises targeting the gastrocnemius and soleus muscles for 10 to 15 minutes in the evening and at bedtime. If the morning symptoms persist, she should continue the stretching exercises upon waking.    2. Slurred speech.  She reports experiencing slurred speech since March, particularly when talking for extended periods. An MRI of the brain will be ordered to investigate the cause. Blood work will also be " conducted to assess the neuromuscular junction as well as the muscles.    Follow-up  The patient will follow up on 07/19/2025.      Total time spent with the patient and coordinating patient care was 30 minutes.    Follow Up  No follow-ups on file.  Patient was given instructions and counseling regarding her condition or for health maintenance advice. Please see specific information pulled into the AVS if appropriate.     Patient or patient representative verbalized consent for the use of Ambient Listening during the visit with  Tree Moseley MD for chart documentation. 6/19/2025  14:33 EDT

## 2025-06-24 ENCOUNTER — TELEPHONE (OUTPATIENT)
Dept: NEUROLOGY | Facility: CLINIC | Age: 80
End: 2025-06-24

## 2025-06-24 NOTE — TELEPHONE ENCOUNTER
Caller: Marci Dudley    Relationship: Self    Best call back number: [phone]     What was the call regarding: THE PT IS SCHEDULED FOR 7/16/25 BUT HER MRI WAS JUST SCHEDULED FOR 7/29/25 IN THE EVENING. SHE WANTS TO KNOW IS SHE TO KEEP HER 7/16/25 APPT OR DOES THAT NEED TO BE AFTER FOR MRI.     PLEASE ADVISE  THANK YOU

## 2025-06-27 ENCOUNTER — OFFICE VISIT (OUTPATIENT)
Age: 80
End: 2025-06-27
Payer: MEDICARE

## 2025-06-27 VITALS
WEIGHT: 167.7 LBS | OXYGEN SATURATION: 96 % | HEART RATE: 77 BPM | HEIGHT: 67 IN | SYSTOLIC BLOOD PRESSURE: 120 MMHG | DIASTOLIC BLOOD PRESSURE: 64 MMHG | BODY MASS INDEX: 26.32 KG/M2

## 2025-06-27 DIAGNOSIS — K64.2 GRADE III HEMORRHOIDS: Primary | ICD-10-CM

## 2025-06-27 DIAGNOSIS — K56.2 INTESTINAL VOLVULUS: ICD-10-CM

## 2025-06-27 DIAGNOSIS — K59.09 OTHER CONSTIPATION: ICD-10-CM

## 2025-06-27 PROCEDURE — 3074F SYST BP LT 130 MM HG: CPT | Performed by: NURSE PRACTITIONER

## 2025-06-27 PROCEDURE — 3078F DIAST BP <80 MM HG: CPT | Performed by: NURSE PRACTITIONER

## 2025-06-27 PROCEDURE — 1125F AMNT PAIN NOTED PAIN PRSNT: CPT | Performed by: NURSE PRACTITIONER

## 2025-06-27 PROCEDURE — 1159F MED LIST DOCD IN RCRD: CPT | Performed by: NURSE PRACTITIONER

## 2025-06-27 PROCEDURE — 1160F RVW MEDS BY RX/DR IN RCRD: CPT | Performed by: NURSE PRACTITIONER

## 2025-06-27 PROCEDURE — 99214 OFFICE O/P EST MOD 30 MIN: CPT | Performed by: NURSE PRACTITIONER

## 2025-06-27 RX ORDER — HYDROCORTISONE 25 MG/G
CREAM TOPICAL 2 TIMES DAILY
Qty: 28 G | Refills: 1 | Status: SHIPPED | OUTPATIENT
Start: 2025-06-27
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
## 2025-06-27 NOTE — PATIENT INSTRUCTIONS
Maintain hydration  Force fluids  MiraLAX daily  Increase fiber  Probiotics   No lifting pushing pulling or straining  Use medication as instructed  Increase activity  If worsen or persist rto

## 2025-07-29 ENCOUNTER — HOSPITAL ENCOUNTER (OUTPATIENT)
Dept: MRI IMAGING | Facility: HOSPITAL | Age: 80
Discharge: HOME | End: 2025-07-29
Admitting: PSYCHIATRY & NEUROLOGY
Payer: MEDICARE

## 2025-07-29 DIAGNOSIS — R47.1 DYSARTHRIA: ICD-10-CM

## 2025-07-29 PROCEDURE — 70553 MRI BRAIN STEM W/O & W/DYE: CPT

## 2025-07-29 PROCEDURE — A9573 GADOPICLENOL 0.5 MMOL/ML SOLUTION: HCPCS | Performed by: PSYCHIATRY & NEUROLOGY

## 2025-07-29 PROCEDURE — 25510000001 GADOPICLENOL 0.5 MMOL/ML SOLUTION: Performed by: PSYCHIATRY & NEUROLOGY

## 2025-07-29 RX ADMIN — GADOPICLENOL 7 ML: 485.1 INJECTION INTRAVENOUS at 20:33

## 2025-07-31 ENCOUNTER — OFFICE VISIT (OUTPATIENT)
Dept: NEUROLOGY | Facility: CLINIC | Age: 80
End: 2025-07-31
Payer: MEDICARE

## 2025-07-31 ENCOUNTER — PATIENT ROUNDING (BHMG ONLY) (OUTPATIENT)
Dept: NEUROLOGY | Facility: CLINIC | Age: 80
End: 2025-07-31
Payer: MEDICARE

## 2025-07-31 VITALS
WEIGHT: 170 LBS | HEART RATE: 68 BPM | DIASTOLIC BLOOD PRESSURE: 62 MMHG | BODY MASS INDEX: 26.68 KG/M2 | SYSTOLIC BLOOD PRESSURE: 117 MMHG | HEIGHT: 67 IN

## 2025-07-31 DIAGNOSIS — R25.2 BILATERAL LEG CRAMPS: ICD-10-CM

## 2025-07-31 DIAGNOSIS — R47.1 DYSARTHRIA: Primary | ICD-10-CM

## 2025-07-31 PROCEDURE — 1160F RVW MEDS BY RX/DR IN RCRD: CPT | Performed by: PSYCHIATRY & NEUROLOGY

## 2025-07-31 PROCEDURE — 3078F DIAST BP <80 MM HG: CPT | Performed by: PSYCHIATRY & NEUROLOGY

## 2025-07-31 PROCEDURE — 99214 OFFICE O/P EST MOD 30 MIN: CPT | Performed by: PSYCHIATRY & NEUROLOGY

## 2025-07-31 PROCEDURE — 1159F MED LIST DOCD IN RCRD: CPT | Performed by: PSYCHIATRY & NEUROLOGY

## 2025-07-31 PROCEDURE — 3074F SYST BP LT 130 MM HG: CPT | Performed by: PSYCHIATRY & NEUROLOGY
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
## 2025-08-13 ENCOUNTER — LAB (OUTPATIENT)
Dept: LAB | Facility: HOSPITAL | Age: 80
End: 2025-08-13
Payer: MEDICARE

## 2025-08-13 DIAGNOSIS — R47.1 DYSARTHRIA: ICD-10-CM

## 2025-08-13 DIAGNOSIS — R79.9 ABNORMAL FINDING OF BLOOD CHEMISTRY, UNSPECIFIED: ICD-10-CM

## 2025-08-13 DIAGNOSIS — R25.2 BILATERAL LEG CRAMPS: ICD-10-CM

## 2025-08-13 LAB
CK SERPL-CCNC: 160 U/L (ref 20–180)
FERRITIN SERPL-MCNC: 122 NG/ML (ref 13–150)
IRON 24H UR-MRATE: 119 MCG/DL (ref 37–145)
IRON SATN MFR SERPL: 31 % (ref 20–50)
TIBC SERPL-MCNC: 387 MCG/DL (ref 298–536)
TRANSFERRIN SERPL-MCNC: 260 MG/DL (ref 200–360)

## 2025-08-13 PROCEDURE — 84466 ASSAY OF TRANSFERRIN: CPT

## 2025-08-13 PROCEDURE — 83540 ASSAY OF IRON: CPT

## 2025-08-13 PROCEDURE — 82550 ASSAY OF CK (CPK): CPT

## 2025-08-13 PROCEDURE — 86042 ACETYLCHOLN RCPTR BLCKG ANTB: CPT

## 2025-08-13 PROCEDURE — 86043 ACETYLCHOLN RCPTR MODLG ANTB: CPT

## 2025-08-13 PROCEDURE — 36415 COLL VENOUS BLD VENIPUNCTURE: CPT

## 2025-08-13 PROCEDURE — 86041 ACETYLCHOLN RCPTR BNDNG ANTB: CPT

## 2025-08-13 PROCEDURE — 82728 ASSAY OF FERRITIN: CPT

## 2025-08-14 ENCOUNTER — TELEPHONE (OUTPATIENT)
Age: 80
End: 2025-08-14
Payer: MEDICARE

## 2025-08-15 RX ORDER — CITALOPRAM HYDROBROMIDE 10 MG/1
10 TABLET ORAL DAILY
Qty: 90 TABLET | Refills: 0 | Status: SHIPPED | OUTPATIENT
Start: 2025-08-15

## 2025-08-19 ENCOUNTER — TELEPHONE (OUTPATIENT)
Dept: ORTHOPEDIC SURGERY | Facility: CLINIC | Age: 80
End: 2025-08-19
Payer: MEDICARE

## 2025-08-19 LAB
ACHR BIND AB SER-SCNC: 22.6 NMOL/L (ref 0–0.24)
ACHR BLOCK AB SER-ACNC: 48 % (ref 0–25)
ACHR MOD AB SER QL FC: 72 % (ref 0–45)
AMPAR1 AB SER QL IF: NEGATIVE
AMPAR2 AB SER QL IF: NEGATIVE
AMPHIPHYSIN AB SER QL: NEGATIVE
AQP4 H2O CHANNEL AB SERPL IA-ACNC: NEGATIVE
CASPR2 IGG SERPL QL IF: NEGATIVE
CV2 IGG SER-ACNC: NEGATIVE
DPPX IGG SERPL QL IF: NEGATIVE
GABABR AB SER QL IF: NEGATIVE
GAD65 IGG+IGM SER IA-SCNC: NEGATIVE NMOL/L
GLIAL NUC TYPE 1 AB SER QL IF: NEGATIVE
HU AB SER QL IF: NEGATIVE
HU2 AB SER QL IF: NEGATIVE
HU3 AB SER QL: NEGATIVE
IGLON5 IGG SER QL IF: NEGATIVE
IMP & REVIEW OF LAB RESULTS: NEGATIVE
IP3R 1 IGG SER QL IF: NEGATIVE
LGI1 IGG SERPL QL IF: NEGATIVE
MA+TA AB SER QL: NEGATIVE
MGLUR1 IGG SER QL IF: NEGATIVE
NMDAR1 AB SER QL IF: NEGATIVE
PCA-1 AB SER QL IF: NEGATIVE
PCA-2 AB SER QL IF: NEGATIVE
PCA-TR AB SER QL IF: NEGATIVE
PCA-TR AB SER QL IF: NEGATIVE
VGCC AB SER-SCNC: <1 PMOL/L (ref 0–30)
ZIC4 AB SER QL: NEGATIVE

## 2025-08-20 ENCOUNTER — TELEPHONE (OUTPATIENT)
Dept: NEUROLOGY | Facility: CLINIC | Age: 80
End: 2025-08-20
Payer: MEDICARE

## 2025-08-21 ENCOUNTER — OFFICE VISIT (OUTPATIENT)
Dept: ORTHOPEDIC SURGERY | Facility: CLINIC | Age: 80
End: 2025-08-21
Payer: MEDICARE

## 2025-08-21 ENCOUNTER — HOSPITAL ENCOUNTER (OUTPATIENT)
Dept: GENERAL RADIOLOGY | Facility: HOSPITAL | Age: 80
Discharge: HOME | End: 2025-08-21
Admitting: UROLOGY
Payer: MEDICARE

## 2025-08-21 ENCOUNTER — EXTERNAL PBMM DATA (OUTPATIENT)
Dept: PHARMACY | Facility: OTHER | Age: 80
End: 2025-08-21
Payer: MEDICARE

## 2025-08-21 VITALS
HEIGHT: 67 IN | BODY MASS INDEX: 26.68 KG/M2 | SYSTOLIC BLOOD PRESSURE: 115 MMHG | DIASTOLIC BLOOD PRESSURE: 56 MMHG | WEIGHT: 170 LBS | HEART RATE: 65 BPM | OXYGEN SATURATION: 91 %

## 2025-08-21 DIAGNOSIS — Z01.818 PRE-OP TESTING: ICD-10-CM

## 2025-08-21 DIAGNOSIS — N20.0 CALCIUM OXALATE KIDNEY STONES: ICD-10-CM

## 2025-08-21 DIAGNOSIS — Z96.642 S/P TOTAL LEFT HIP ARTHROPLASTY: ICD-10-CM

## 2025-08-21 DIAGNOSIS — Z96.642 AFTERCARE FOLLOWING LEFT HIP JOINT REPLACEMENT SURGERY: ICD-10-CM

## 2025-08-21 DIAGNOSIS — Z47.1 AFTERCARE FOLLOWING LEFT HIP JOINT REPLACEMENT SURGERY: ICD-10-CM

## 2025-08-21 DIAGNOSIS — M25.552 LEFT HIP PAIN: Primary | ICD-10-CM

## 2025-08-21 DIAGNOSIS — N20.0 CALCIUM OXALATE KIDNEY STONES: Primary | ICD-10-CM

## 2025-08-21 PROCEDURE — 3078F DIAST BP <80 MM HG: CPT

## 2025-08-21 PROCEDURE — 99213 OFFICE O/P EST LOW 20 MIN: CPT

## 2025-08-21 PROCEDURE — 1160F RVW MEDS BY RX/DR IN RCRD: CPT

## 2025-08-21 PROCEDURE — 1159F MED LIST DOCD IN RCRD: CPT

## 2025-08-21 PROCEDURE — 74018 RADEX ABDOMEN 1 VIEW: CPT

## 2025-08-21 PROCEDURE — 3074F SYST BP LT 130 MM HG: CPT

## 2025-08-26 ENCOUNTER — OFFICE VISIT (OUTPATIENT)
Dept: NEUROLOGY | Facility: CLINIC | Age: 80
End: 2025-08-26
Payer: MEDICARE

## 2025-08-26 VITALS
DIASTOLIC BLOOD PRESSURE: 64 MMHG | WEIGHT: 170 LBS | HEART RATE: 70 BPM | BODY MASS INDEX: 26.68 KG/M2 | SYSTOLIC BLOOD PRESSURE: 112 MMHG | HEIGHT: 67 IN

## 2025-08-26 DIAGNOSIS — G70.00 MYASTHENIA GRAVIS WITHOUT EXACERBATION: Primary | ICD-10-CM

## 2025-08-26 RX ORDER — PYRIDOSTIGMINE BROMIDE 60 MG/1
TABLET ORAL
Qty: 180 TABLET | Refills: 2 | Status: SHIPPED | OUTPATIENT
Start: 2025-08-26

## 2025-08-28 ENCOUNTER — OFFICE VISIT (OUTPATIENT)
Dept: UROLOGY | Age: 80
End: 2025-08-28
Payer: MEDICARE

## 2025-08-28 VITALS — HEIGHT: 67 IN | BODY MASS INDEX: 26.62 KG/M2

## 2025-08-28 DIAGNOSIS — N20.0 CALCIUM OXALATE KIDNEY STONES: Primary | ICD-10-CM

## 2025-08-29 ENCOUNTER — POP HEALTH PHARMACY (OUTPATIENT)
Dept: PHARMACY | Facility: OTHER | Age: 80
End: 2025-08-29
Payer: MEDICARE

## (undated) DEVICE — GLOVE,SURG,SENSICARE SLT,LF,PF,7.5: Brand: MEDLINE

## (undated) DEVICE — DRP SURG U/DRP INVISISHIELD PA 48X52IN

## (undated) DEVICE — TOTAL ANTERIOR HIP-LF: Brand: MEDLINE INDUSTRIES, INC.

## (undated) DEVICE — APPL CHLORAPREP HI/LITE 26ML ORNG

## (undated) DEVICE — SYR LUERLOK 20CC BX/50

## (undated) DEVICE — SHEATH URETRL ACC NAVIGATOR 13/15F 36CM 5PK

## (undated) DEVICE — TOWEL,OR,DSP,ST,BLUE,STD,4/PK,20PK/CS: Brand: MEDLINE

## (undated) DEVICE — CYSTO PACK: Brand: MEDLINE INDUSTRIES, INC.

## (undated) DEVICE — SUT VIC 2/0 CT1 36IN

## (undated) DEVICE — PULLOVER TOGA, 2X LARGE: Brand: FLYTE, SURGICOOL

## (undated) DEVICE — SKIN PREP TRAY W/CHG: Brand: MEDLINE INDUSTRIES, INC.

## (undated) DEVICE — PENCL SMOKE/EVAC MEGADYNE TELESCP 10FT

## (undated) DEVICE — PRT BIOP SEALS

## (undated) DEVICE — GAUZE,SPONGE,4"X4",16PLY,STRL,LF,10/TRAY: Brand: MEDLINE

## (undated) DEVICE — PAD FLR QUICKSUITE ABS IMPERV 46X40IN

## (undated) DEVICE — THE STERILE LIGHT HANDLE COVER IS USED WITH STERIS SURGICAL LIGHTING AND VISUALIZATION SYSTEMS.

## (undated) DEVICE — GLOVE,SURG,SENSICARE SLT,LF,PF,6.5: Brand: MEDLINE

## (undated) DEVICE — CATH URETRL OPEN END W/CONNECT 5F 70CM

## (undated) DEVICE — STERILE POLYISOPRENE POWDER-FREE SURGICAL GLOVES WITH EMOLLIENT COATING: Brand: PROTEXIS

## (undated) DEVICE — SLV SCD KN/LEN ADJ EXPRSS BLENDED MD 1P/U

## (undated) DEVICE — GLOVE,SURG,SENSICARE SLT,LF,PF,7: Brand: MEDLINE

## (undated) DEVICE — BG TRAP DRN UROL OPN/MESH/FLTR 163X2CM 51CM/COMPR DISP STRL

## (undated) DEVICE — NITINOL WIRE WITH HYDROPHILIC TIP: Brand: SENSOR

## (undated) DEVICE — 4-PORT MANIFOLD: Brand: NEPTUNE 2

## (undated) DEVICE — GLOVE,SURG,SENSICARE SLT,LF,PF,8.5: Brand: MEDLINE

## (undated) DEVICE — SOL IRRG H2O BG 3000ML STRL

## (undated) DEVICE — LOU CYSTO: Brand: MEDLINE INDUSTRIES, INC.

## (undated) DEVICE — SYS IRR PUMP SGL ACTN VAC SYR 10CC

## (undated) DEVICE — BNDG COBAN S/ADHR WRP LF 4IN 5YD TN

## (undated) DEVICE — CATH URETRL 2 LUM 10FR

## (undated) DEVICE — CYSTO/BLADDER IRRIGATION SET, REGULATING CLAMP

## (undated) DEVICE — KT PT POSITION SUPINE HANA/PROFX TABL

## (undated) DEVICE — Device

## (undated) DEVICE — GLV SURG SENSICARE PI PF LF 7 GRN STRL

## (undated) DEVICE — GLV SURG BIOGEL LTX PF 7 1/2

## (undated) DEVICE — CVR LEG BOOTLEG F/R NOSKID 33IN

## (undated) DEVICE — PEEL-AWAY TOGA, 2X LARGE: Brand: FLYTE

## (undated) DEVICE — PROXIMATE RH ROTATING HEAD SKIN STAPLERS (35 WIDE) CONTAINS 35 STAINLESS STEEL STAPLES: Brand: PROXIMATE

## (undated) DEVICE — STRYKER PERFORMANCE SERIES SAGITTAL BLADE: Brand: STRYKER PERFORMANCE SERIES

## (undated) DEVICE — ANTIBACTERIAL VIOLET BRAIDED (POLYGLACTIN 910), SYNTHETIC ABSORBABLE SURGICAL SUTURE: Brand: COATED VICRYL

## (undated) DEVICE — NITINOL STONE RETRIEVAL DEVICE: Brand: DAKOTA

## (undated) DEVICE — PCH INST SURG INVISISHIELD 2PCKT

## (undated) DEVICE — GLV SURG BIOGEL LTX PF 8 1/2

## (undated) DEVICE — SOL NACL 0.9PCT 100ML SGL

## (undated) DEVICE — BIPOLAR SEALER 23-112-1 AQM 6.0: Brand: AQUAMANTYS ®

## (undated) DEVICE — MAT FLR ABS W/BLU/LINER 56X72IN WHT

## (undated) DEVICE — SYR LUERLOK 30CC